# Patient Record
Sex: FEMALE | Race: WHITE | NOT HISPANIC OR LATINO | Employment: OTHER | URBAN - METROPOLITAN AREA
[De-identification: names, ages, dates, MRNs, and addresses within clinical notes are randomized per-mention and may not be internally consistent; named-entity substitution may affect disease eponyms.]

---

## 2017-01-14 ENCOUNTER — OFFICE VISIT (OUTPATIENT)
Dept: FAMILY MEDICINE | Facility: CLINIC | Age: 52
End: 2017-01-14

## 2017-01-14 VITALS
HEART RATE: 64 BPM | DIASTOLIC BLOOD PRESSURE: 86 MMHG | WEIGHT: 293 LBS | OXYGEN SATURATION: 96 % | SYSTOLIC BLOOD PRESSURE: 128 MMHG | HEIGHT: 60 IN | RESPIRATION RATE: 16 BRPM | BODY MASS INDEX: 57.52 KG/M2 | TEMPERATURE: 98 F

## 2017-01-14 DIAGNOSIS — L40.9 PSORIASIS: ICD-10-CM

## 2017-01-14 DIAGNOSIS — I10 ESSENTIAL HYPERTENSION, BENIGN: ICD-10-CM

## 2017-01-14 DIAGNOSIS — R60.0 LOCALIZED EDEMA: ICD-10-CM

## 2017-01-14 DIAGNOSIS — E66.01 MORBID OBESITY WITH BMI OF 60.0-69.9, ADULT (H): ICD-10-CM

## 2017-01-14 DIAGNOSIS — L03.115 CELLULITIS OF RIGHT LOWER EXTREMITY: Primary | ICD-10-CM

## 2017-01-14 PROCEDURE — 99214 OFFICE O/P EST MOD 30 MIN: CPT | Performed by: FAMILY MEDICINE

## 2017-01-14 RX ORDER — LOSARTAN POTASSIUM AND HYDROCHLOROTHIAZIDE 25; 100 MG/1; MG/1
1 TABLET ORAL DAILY
Qty: 90 TABLET | Refills: 1 | Status: SHIPPED | OUTPATIENT
Start: 2017-01-14 | End: 2017-07-11

## 2017-01-14 RX ORDER — ATENOLOL 25 MG/1
50 TABLET ORAL
Qty: 180 TABLET | Refills: 1 | Status: SHIPPED | OUTPATIENT
Start: 2017-01-14 | End: 2017-07-11

## 2017-01-14 NOTE — NURSING NOTE
Mirta Decker is here today for a possible Cellulitis Infection: Started feeling like she might have a Cellulitis infection starting (chills, stomach aches, nausea, moisture behind knees) x2 weeks.    Pre-Visit Screening :  Immunizations : up to date  Colonoscopy : is due and to be scheduled by patient for later completion  Mammogram : is due and to be scheduled by patient for later completion  Asthma Action Test/Plan : NA  PHQ9/GAD7 :  NA  BP done on the left arm, with a large sized cuff.  Pulse - regular  My Chart - accepts    CLASSIFICATION OF OVERWEIGHT AND OBESITY BY BMI                         Obesity Class           BMI(kg/m2)  Underweight                                    < 18.5  Normal                                         18.5-24.9  Overweight                                     25.0-29.9  OBESITY                     I                  30.0-34.9                              II                 35.0-39.9  EXTREME OBESITY             III                >40                             Patient's  BMI Body mass index is 67.57 kg/(m^2).  http://hin.nhlbi.nih.gov/menuplanner/menu.cgi  Questioned patient about current smoking habits.  Pt. has never smoked.  Ruth Martinez, CMA

## 2017-01-14 NOTE — PATIENT INSTRUCTIONS
Back to Dr Elise    1)  Medication: continue current medication regimen unchanged  2)  Dietary sodium restriction  3)  Regular aerobic exercise  4)  Recheck in 6 months fasting, sooner should new symptoms or   problems arise.    Patient Education: Reviewed risks of hypertension and principles of   treatment.

## 2017-01-14 NOTE — PROGRESS NOTES
Two issues/ 2 notes    1. SUBJECTIVE: 52 year old female complaining of right posterior leg irritation and chills for 1 day(s).   The patient describes chronic recurrent cellulitis with psoriatic lesions on the back of her legs at the knee/ skin rubs together with every step.   The patient denies a history of GI symptoms, pain or respiratory issues.   Smoking history: No.   Relevant past medical history: positive for followed by dermatology/ off medications and reintroducing for psoriasis control/ has an appointment in 3 days. Needs BP medications/ see second note.    OBJECTIVE: The patient appears healthy, alert, no distress, cooperative, over weight and fatigued.   EXT; Right posterior knee psoriatic lesions with foul smelling drainage and a 3 by 8 cm area of cellulitis. Tender to the touch. The lower extremities are normal and reveal no sign of DVT. Calves and thighs are soft and non tender, no swelling. Sophia's sign is negative.  Pedal pulses are normal.    ASSESSMENT: (L03.115) Cellulitis of right lower extremity  (primary encounter diagnosis)  Comment: good skin care  Plan: cephalexin (KEFLEX) 250 MG capsule        Potential medication side effects were discussed with the patient; let me know if any occur.  Back to specialists    (L40.9) Psoriasis  Plan: cephalexin (KEFLEX) 250 MG capsule        I have reviewed the patient's medical history in detail and updated the computerized patient record.      (I10) Essential hypertension, benign  Plan: losartan-hydrochlorothiazide (HYZAAR) 100-25 MG        per tablet, atenolol (TENORMIN) 25 MG tablet        1)  Medication: continue current medication regimen unchanged  2)  Dietary sodium restriction  3)  Regular aerobic exercise  4)  Recheck in 6 months fasting, sooner should new symptoms or   problems arise.    Patient Education: Reviewed risks of hypertension and principles of   treatment.        (R60.0) Localized edema  Plan: losartan-hydrochlorothiazide (HYZAAR)  100-25 MG        per tablet            (E66.01,  Z68.44) Morbid obesity with BMI of 60.0-69.9, adult (H)  Plan:         2. SUBJECTIVE:  Mirta Decker is an 52 year old female who presents for evaluation of   hypertension. She indicates that she is feeling well and   denies any symptoms referable to her elevated blood pressure.   Specifically denies chest pain, palpitations, dyspnea, orthopnea,   PND or peripheral edema. Current medication regimen is as listed   below. Patient denies any side effects of medication.    Family history: positive for hypertension, diabetes mellitus and cardiovascular disease  Age at onset of elevated blood pressure: 35 with weight gain  Cardiovascular risk factors: family history, hypertension, obesity and sedentary life style  Use of agents associated with hypertension: none  History of renal disease: negative  History of flank trauma: negative    Current Outpatient Prescriptions   Medication     cephalexin (KEFLEX) 250 MG capsule     losartan-hydrochlorothiazide (HYZAAR) 100-25 MG per tablet     atenolol (TENORMIN) 25 MG tablet     Cholecalciferol (VITAMIN D) 2000 UNITS tablet     folic acid (FOLVITE) 400 MCG tablet     Lactobacillus-Inulin (CULTURELLE DIGESTIVE HEALTH) CAPS     Methotrexate, PF, 25 MG/0.5ML SOAJ     apremilast (OTEZLA) 30 MG tablet     calcipotriene (DOVONOX) 0.005 % cream     UNABLE TO FIND     ondansetron (ZOFRAN-ODT) 4 MG disintegrating tablet     prochlorperazine (COMPAZINE) 5 MG tablet     Menthol-Zinc Oxide (GOLD BOND) POWD     IBUPROFEN PO     Acetaminophen (TYLENOL EXTRA STRENGTH PO)     ciclopirox (LOPROX) 0.77 % GEL     [DISCONTINUED] losartan-hydrochlorothiazide (HYZAAR) 100-25 MG per tablet     [DISCONTINUED] atenolol (TENORMIN) 25 MG tablet     No current facility-administered medications for this visit.     Allergies   Allergen Reactions     Penicillin G        Social History   Substance Use Topics     Smoking status: Never Smoker      Smokeless  tobacco: Never Used     Alcohol Use: 0.5 oz/week     1 drink(s) per week      Comment: occasional       OBJECTIVE:  /86 mmHg  Pulse 64  Temp(Src) 98  F (36.7  C) (Oral)  Resp 16  Ht 1.524 m (5')  Wt 156.945 kg (346 lb)  BMI 67.57 kg/m2  SpO2 96%  Repeat BP R arm seated = 128/86   with X-large size cuff.  Fundi: deferred  Thyroid: normal to inspection and palpation  Lungs: negative, Percussion normal. Good diaphragmatic excursion. Lungs clear  Heart: negative, PMI normal. No lifts, heaves, or thrills. RRR. No murmurs, clicks gallops or rub  Peripheral pulses: radial=4/4, femoral=4/4, popliteal=4/4, dorsalis pedis=4/4,

## 2017-07-04 ENCOUNTER — TELEPHONE (OUTPATIENT)
Dept: FAMILY MEDICINE | Facility: CLINIC | Age: 52
End: 2017-07-04

## 2017-07-04 NOTE — TELEPHONE ENCOUNTER
Pain in calf- worried she might have blood clot- want to be seen today  Informed office is closed  Directed to urgent care

## 2017-07-04 NOTE — TELEPHONE ENCOUNTER
Correction to above note (previous note was for different patient)    Patient on immunosuppressives, developing a rash behind her knee  Running low grade fever  On immunosuppressants  Requesting cephalexin be called in for her   Office is closed for fourth of July  Advised that she be seen in ER (complex medical problems including immunosuppression and infection)    She was unhappy with this medical advice-  Does not want to go to ER and wait to be seen

## 2017-07-11 ENCOUNTER — OFFICE VISIT (OUTPATIENT)
Dept: FAMILY MEDICINE | Facility: CLINIC | Age: 52
End: 2017-07-11

## 2017-07-11 VITALS
SYSTOLIC BLOOD PRESSURE: 140 MMHG | TEMPERATURE: 98.2 F | HEIGHT: 60 IN | HEART RATE: 68 BPM | RESPIRATION RATE: 18 BRPM | OXYGEN SATURATION: 96 % | DIASTOLIC BLOOD PRESSURE: 90 MMHG | BODY MASS INDEX: 57.52 KG/M2 | WEIGHT: 293 LBS

## 2017-07-11 DIAGNOSIS — R60.0 LOCALIZED EDEMA: ICD-10-CM

## 2017-07-11 DIAGNOSIS — I10 ESSENTIAL HYPERTENSION, BENIGN: Primary | ICD-10-CM

## 2017-07-11 DIAGNOSIS — Z76.0 ENCOUNTER FOR MEDICATION REFILL: ICD-10-CM

## 2017-07-11 PROCEDURE — 80053 COMPREHEN METABOLIC PANEL: CPT | Mod: 90 | Performed by: FAMILY MEDICINE

## 2017-07-11 PROCEDURE — 36415 COLL VENOUS BLD VENIPUNCTURE: CPT | Performed by: FAMILY MEDICINE

## 2017-07-11 PROCEDURE — 99213 OFFICE O/P EST LOW 20 MIN: CPT | Performed by: FAMILY MEDICINE

## 2017-07-11 RX ORDER — LOSARTAN POTASSIUM AND HYDROCHLOROTHIAZIDE 25; 100 MG/1; MG/1
1 TABLET ORAL DAILY
Qty: 90 TABLET | Refills: 1 | Status: SHIPPED | OUTPATIENT
Start: 2017-07-11 | End: 2018-05-01

## 2017-07-11 RX ORDER — ATENOLOL 25 MG/1
25 TABLET ORAL
COMMUNITY
Start: 2017-07-11 | End: 2018-05-01

## 2017-07-11 NOTE — MR AVS SNAPSHOT
After Visit Summary   7/11/2017    Mirta Decker    MRN: [id number]           Patient Information     Date Of Birth          1965        Visit Information        Provider Department      7/11/2017 5:30 PM Yani Martin MD [facility]        Today's Diagnoses     Essential hypertension, benign    -  1    Localized edema        Encounter for medication refill          Care Instructions    1)  Medication: continue current medication regimen unchanged  2)  Dietary sodium restriction  3)  Regular aerobic exercise  4)  Recheck in 6 months, sooner should new symptoms or   problems arise.    Patient Education: Reviewed risks of hypertension and principles of   treatment.              Follow-ups after your visit        Follow-up notes from your care team     Return in about 6 months (around 1/11/2018).      Who to contact     If you have questions or need follow up information about today's clinic visit or your schedule please contact [facility] directly at [phone].  Normal or non-critical lab and imaging results will be communicated to you by Insem Spahart, letter or phone within 4 business days after the clinic has received the results. If you do not hear from us within 7 days, please contact the clinic through Insem Spahart or phone. If you have a critical or abnormal lab result, we will notify you by phone as soon as possible.  Submit refill requests through [facility] or call your pharmacy and they will forward the refill request to us. Please allow 3 business days for your refill to be completed.          Additional Information About Your Visit        MyChart Information     [facility] gives you secure access to your electronic health record. If you see a primary care provider, you can also send messages to your care team and make appointments. If you have questions, please call your primary care clinic.  If you do not have a primary care provider, please call  "[phone] and they will assist you.        Care EveryWhere ID     This is your Care EveryWhere ID. This could be used by other organizations to access your [facility] medical records  [id number]        Your Vitals Were     Pulse Temperature Respirations Height Pulse Oximetry BMI (Body Mass Index)    68 98.2  F (36.8  C) (Oral) 18 1.511 m (4' 11.5\") 96% 70.9 kg/m2       Blood Pressure from Last 3 Encounters:   07/11/17 140/90   01/14/17 128/86   08/19/16 138/78    Weight from Last 3 Encounters:   07/11/17 (!) 161.9 kg (357 lb)   01/14/17 (!) 156.9 kg (346 lb)   08/19/16 (!) 147 kg (324 lb)              We Performed the Following     COMPREHENSIVE METABOLIC PANEL (QUEST) XCMP     VENOUS COLLECTION          Today's Medication Changes          These changes are accurate as of: 7/11/17  7:19 PM.  If you have any questions, ask your nurse or doctor.               These medicines have changed or have updated prescriptions.        Dose/Directions    atenolol 25 MG tablet   Commonly known as:  TENORMIN   This may have changed:  how much to take   Used for:  Essential hypertension, benign   Changed by:  Yani Martin MD        Dose:  25 mg   Take 1 tablet (25 mg) by mouth 2 times daily   Refills:  0            Where to get your medicines      These medications were sent to [facility] #[id number] [place] [id number]     Phone:  [phone]     losartan-hydrochlorothiazide 100-25 MG per tablet                Primary Care Provider Office Phone # Fax #    Yani Martin [phone]       [facility] [address] [address]        Equal Access to Services     LEONCIO DOUGLASS AH: Rosita Constantino, waaxda luqadaha, qaybta kaalmada adeegyada, bakari bright. So [facility] [phone].    ATENCIÓN: Si habla español, tiene a gant disposición servicios gratuitos de asistencia lingüística. " Delta dowling [phone].    We comply with applicable federal civil rights laws and [place] laws. We do not discriminate on the basis of race, color, national origin, age, disability sex, sexual orientation or gender identity.            Thank you!     Thank you for choosing [facility]  for your care. Our goal is always to provide you with excellent care. Hearing back from our patients is one way we can continue to improve our services. Please take a few minutes to complete the written survey that you may receive in the mail after your visit with us. Thank you!             Your Updated Medication List - Protect others around you: Learn how to safely use, store and throw away your medicines at www.disposemymeds.org.          This list is accurate as of: 7/11/17  7:19 PM.  Always use your most recent med list.                   Brand Name Dispense Instructions for use Diagnosis    atenolol 25 MG tablet    TENORMIN     Take 1 tablet (25 mg) by mouth 2 times daily    Essential hypertension, benign       calcipotriene 0.005 % cream    DOVONOX     Apply topically 2 times daily    Psoriatic arthropathy (H)       CULTURELLE DIGESTIVE HEALTH Caps       Cellulitis of right lower extremity       folic acid 400 MCG tablet    FOLVITE     Take 1 tablet (400 mcg) by mouth daily    Psoriatic arthropathy (H)       GOLD BOND Powd      Externally apply topically daily as needed        IBUPROFEN PO      Take 200-800 mg by mouth every 8 hours as needed for moderate pain        LOPROX 0.77 % Gel   Generic drug:  ciclopirox      Externally apply topically daily as needed (for psoriasis)    Psoriatic arthropathy (H)       losartan-hydrochlorothiazide 100-25 MG per tablet    HYZAAR    90 tablet    Take 1 tablet by mouth daily    Essential hypertension, benign, Localized edema       Methotrexate (PF) 25 MG/0.5ML Soaj      Inject Subcutaneous once a week    Psoriatic arthropathy (H)       ondansetron 4 MG ODT tab     ZOFRAN-ODT    30 tablet    Take 1 tablet (4 mg) by mouth every 6 hours as needed for nausea    Cellulitis of right lower extremity       OTEZLA 30 MG tablet   Generic drug:  apremilast      Take 30 mg by mouth 2 times daily    Psoriatic arthropathy (H)       prochlorperazine 5 MG tablet    COMPAZINE    30 tablet    Take 1-2 tablets (5-10 mg) by mouth every 6 hours as needed for nausea or vomiting    Cellulitis of right lower extremity       TYLENOL EXTRA STRENGTH PO      Take 1,000 mg by mouth 2 times daily as needed (arthritis)        UNABLE TO FIND      MEDICATION NAME: Cuticort Spray 1% from derm - BID for 3 wks    Psoriatic arthropathy (H)       vitamin D 2000 UNITS tablet      Take 2,000 Units by mouth daily    Psoriatic arthropathy (H)

## 2017-07-11 NOTE — PROGRESS NOTES
"  SUBJECTIVE:  Mirta Decker is an 52 year old female who presents for evaluation of   hypertension. She indicates that she is feeling well and   denies any symptoms referable to her elevated blood pressure.   Specifically denies chest pain, palpitations, dyspnea, orthopnea,   PND or peripheral edema. Current medication regimen is as listed   below. Patient denies any side effects of medication.    Family history: positive for hypertension, diabetes mellitus and cardiovascular disease  Age at onset of elevated blood pressure: 35 with weight gain  Cardiovascular risk factors: family history, hypertension, obesity and sedentary life style  Use of agents associated with hypertension: none  History of renal disease: negative  History of flank trauma: negative    Current Outpatient Prescriptions   Medication     losartan-hydrochlorothiazide (HYZAAR) 100-25 MG per tablet     Cholecalciferol (VITAMIN D) 2000 UNITS tablet     folic acid (FOLVITE) 400 MCG tablet     Lactobacillus-Inulin ([facility] DIGESTIVE [facility]) CAPS     Methotrexate, PF, 25 MG/0.5ML SOAJ     apremilast (OTEZLA) 30 MG tablet     calcipotriene (DOVONOX) 0.005 % cream     UNABLE TO FIND     ondansetron (ZOFRAN-ODT) 4 MG disintegrating tablet     prochlorperazine (COMPAZINE) 5 MG tablet     Menthol-Zinc Oxide (GOLD BOND) POWD     IBUPROFEN PO     Acetaminophen (TYLENOL EXTRA STRENGTH PO)     ciclopirox (LOPROX) 0.77 % GEL     No current facility-administered medications for this visit.      Allergies   Allergen Reactions     Penicillin G        Social History   Substance Use Topics     Smoking status: Never Smoker     Smokeless tobacco: Never Used     Alcohol use 0.5 oz/week     1 drink(s) per week      Comment: occasional       OBJECTIVE:  /90 (BP Location: Left arm, Cuff Size: Adult Large)  Pulse 68  Temp 98.2  F (36.8  C) (Oral)  Resp 18  Ht 1.511 m (4' 11.5\")  Wt (!) 161.9 kg (357 lb)  SpO2 96%  BMI 70.9 kg/m2  Repeat BP R arm seated " 140/90 with regular size cuff.  Fundi: deferred  Thyroid: normal to inspection and palpation  Lungs: negative, Percussion normal. Good diaphragmatic excursion. Lungs clear  Heart: negative, PMI normal. No lifts, heaves, or thrills. RRR. No murmurs, clicks gallops or rub  Peripheral pulses: radial=4/4, femoral=4/4, popliteal=4/4, dorsalis pedis=4/4,  Abd: The abdomen is soft without tenderness, guarding, mass or organomegaly. Bowel sounds are normal. No CVA tenderness or inguinal adenopathy noted.  BMI : Body mass index is 70.9 kg/(m^2).    ASSESSMENT:  (I10) Essential hypertension, benign  (primary encounter diagnosis)  Plan: COMPREHENSIVE METABOLIC PANEL ([facility]) XCMP,         VENOUS COLLECTION, atenolol (TENORMIN) 25 MG         tablet, losartan-hydrochlorothiazide (HYZAAR)         100-25 MG per tablet        1)  Medication: continue current medication regimen unchanged  2)  Dietary sodium restriction  3)  Regular aerobic exercise  4)  Recheck in 6 months, sooner should new symptoms or   problems arise.    Patient Education: Reviewed risks of hypertension and principles of   treatment.        (R60.0) Localized edema  Plan: COMPREHENSIVE METABOLIC PANEL ([facility]) XCMP,         VENOUS COLLECTION, losartan-hydrochlorothiazide        (HYZAAR) 100-25 MG per tablet        better    (Z76.0) Encounter for medication refill  Plan: COMPREHENSIVE METABOLIC PANEL (QUEST) XCMP,         VENOUS COLLECTION

## 2017-07-11 NOTE — NURSING NOTE
Patient is here for a recheck of their medication.  Pre-Visit Screening :  Immunizations : up to date    Colonoscopy : declined  Mammogram : declined  Asthma Action Test/Plan : na  PHQ9/GAD7 :  na  Pulse - regular    Medication Reconciliation: complete      CLASSIFICATION OF OVERWEIGHT AND OBESITY BY BMI                         Obesity Class           BMI(kg/m2)  Underweight                                    < 18.5  Normal                                         18.5-24.9  Overweight                                     25.0-29.9  OBESITY                     I                  30.0-34.9                              II                 35.0-39.9  EXTREME OBESITY             III                >40                             Patient's  BMI Body mass index is 70.9 kg/(m^2).  http://hin.nhlbi.nih.gov/menuplanner/menu.cgi  Questioned patient about current smoking habits.  Pt. has never smoked.

## 2017-07-12 NOTE — PATIENT INSTRUCTIONS
1)  Medication: continue current medication regimen unchanged  2)  Dietary sodium restriction  3)  Regular aerobic exercise  4)  Recheck in 6 months, sooner should new symptoms or   problems arise.    Patient Education: Reviewed risks of hypertension and principles of   treatment.

## 2017-07-13 LAB
ALBUMIN SERPL-MCNC: 3.7 G/DL (ref 3.6–5.1)
ALBUMIN/GLOB SERPL: 0.9 (CALC) (ref 1–2.5)
ALP SERPL-CCNC: 66 U/L (ref 33–130)
ALT SERPL-CCNC: 29 U/L (ref 6–29)
AST SERPL-CCNC: 21 U/L (ref 10–35)
BILIRUB SERPL-MCNC: 0.7 MG/DL (ref 0.2–1.2)
BUN SERPL-MCNC: 15 MG/DL (ref 7–25)
BUN/CREATININE RATIO: ABNORMAL (CALC) (ref 6–22)
CALCIUM SERPL-MCNC: 9.3 MG/DL (ref 8.6–10.4)
CHLORIDE SERPLBLD-SCNC: 100 MMOL/L (ref 98–110)
CO2 SERPL-SCNC: 26 MMOL/L (ref 20–31)
CREAT SERPL-MCNC: 0.7 MG/DL (ref 0.5–1.05)
EGFR AFRICAN AMERICAN - QUEST: 114 ML/MIN/1.73M2
GFR SERPL CREATININE-BSD FRML MDRD: 98 ML/MIN/1.73M2
GLOBULIN, CALCULATED - QUEST: 4 G/DL (CALC) (ref 1.9–3.7)
GLUCOSE - QUEST: 94 MG/DL (ref 65–99)
POTASSIUM SERPL-SCNC: 4.1 MMOL/L (ref 3.5–5.3)
PROT SERPL-MCNC: 7.7 G/DL (ref 6.1–8.1)
SODIUM SERPL-SCNC: 139 MMOL/L (ref 135–146)

## 2017-07-25 ENCOUNTER — OFFICE VISIT (OUTPATIENT)
Dept: FAMILY MEDICINE | Facility: CLINIC | Age: 52
End: 2017-07-25

## 2017-07-25 ENCOUNTER — MYC MEDICAL ADVICE (OUTPATIENT)
Dept: FAMILY MEDICINE | Facility: CLINIC | Age: 52
End: 2017-07-25

## 2017-07-25 VITALS
TEMPERATURE: 98.6 F | OXYGEN SATURATION: 97 % | HEART RATE: 102 BPM | RESPIRATION RATE: 12 BRPM | SYSTOLIC BLOOD PRESSURE: 112 MMHG | DIASTOLIC BLOOD PRESSURE: 72 MMHG

## 2017-07-25 DIAGNOSIS — L40.50 PSORIATIC ARTHROPATHY (H): ICD-10-CM

## 2017-07-25 DIAGNOSIS — L02.419 CELLULITIS AND ABSCESS OF LEG, EXCEPT FOOT: Primary | ICD-10-CM

## 2017-07-25 DIAGNOSIS — L03.119 CELLULITIS AND ABSCESS OF LEG, EXCEPT FOOT: Primary | ICD-10-CM

## 2017-07-25 LAB
% GRANULOCYTES: 81.8 %
HCT VFR BLD AUTO: 40.2 % (ref 35–47)
HEMOGLOBIN: 13 G/DL (ref 11.7–15.7)
LYMPHOCYTES NFR BLD AUTO: 14.3 %
MCH RBC QN AUTO: 27.6 PG (ref 26–33)
MCHC RBC AUTO-ENTMCNC: 32.3 G/DL (ref 31–36)
MCV RBC AUTO: 85.4 FL (ref 78–100)
MONOCYTES NFR BLD AUTO: 3.9 %
PLATELET COUNT - QUEST: 200 10^9/L (ref 150–375)
RBC # BLD AUTO: 4.71 10*12/L (ref 3.8–5.2)
WBC # BLD AUTO: 8.5 10*9/L (ref 4–11)

## 2017-07-25 PROCEDURE — 36415 COLL VENOUS BLD VENIPUNCTURE: CPT | Performed by: FAMILY MEDICINE

## 2017-07-25 PROCEDURE — 99213 OFFICE O/P EST LOW 20 MIN: CPT | Performed by: FAMILY MEDICINE

## 2017-07-25 PROCEDURE — 85025 COMPLETE CBC W/AUTO DIFF WBC: CPT | Performed by: FAMILY MEDICINE

## 2017-07-25 RX ORDER — CEPHALEXIN 500 MG/1
500 CAPSULE ORAL 3 TIMES DAILY
Qty: 30 CAPSULE | Refills: 0 | Status: SHIPPED | OUTPATIENT
Start: 2017-07-25 | End: 2017-10-26

## 2017-07-25 RX ORDER — FOLIC ACID 1 MG/1
TABLET ORAL
Refills: 10 | COMMUNITY
Start: 2017-07-16 | End: 2018-05-01

## 2017-07-25 NOTE — MR AVS SNAPSHOT
After Visit Summary   7/25/2017    Mirta Decker    MRN: [id number]           Patient Information     Date Of Birth          1965        Visit Information        Provider Department      7/25/2017 6:15 PM Yani Martin MD [facility]        Today's Diagnoses     Cellulitis and abscess of leg, except foot    -  1    Psoriatic arthropathy (H)          Care Instructions    Gentle skin care  Follow dermatology advise    Recheck by phone 48 hours.  Potential medication side effects were discussed with the patient; let me know if any occur.            Follow-ups after your visit        Follow-up notes from your care team     Return in about 2 days (around 7/27/2017).      Who to contact     If you have questions or need follow up information about today's clinic visit or your schedule please contact [facility] directly at [phone].  Normal or non-critical lab and imaging results will be communicated to you by FounderSynchart, letter or phone within 4 business days after the clinic has received the results. If you do not hear from us within 7 days, please contact the clinic through FounderSynchart or phone. If you have a critical or abnormal lab result, we will notify you by phone as soon as possible.  Submit refill requests through [employer] or call your pharmacy and they will forward the refill request to us. Please allow 3 business days for your refill to be completed.          Additional Information About Your Visit        MyChart Information     [employer] gives you secure access to your electronic health record. If you see a primary care provider, you can also send messages to your care team and make appointments. If you have questions, please call your primary care clinic.  If you do not have a primary care provider, please call [phone] and they will assist you.        Care EveryWhere ID     This is your Care EveryWhere ID. This could be used by other  organizations to access your [facility] medical records  [id number]        Your Vitals Were     Pulse Temperature Respirations Pulse Oximetry          102 98.6  F (37  C) (Oral) 12 97%         Blood Pressure from Last 3 Encounters:   07/25/17 112/72   07/11/17 140/90   01/14/17 128/86    Weight from Last 3 Encounters:   07/11/17 (!) 161.9 kg (357 lb)   01/14/17 (!) 156.9 kg (346 lb)   08/19/16 (!) 147 kg (324 lb)              We Performed the Following     CL AFF HEMOGRAM/PLATE/DIFF (BFP)     VENOUS COLLECTION          Today's Medication Changes          These changes are accurate as of: 7/25/17  7:06 PM.  If you have any questions, ask your nurse or doctor.               Start taking these medicines.        Dose/Directions    cephALEXin 500 MG capsule   Commonly known as:  KEFLEX   Used for:  Cellulitis and abscess of leg, except foot   Started by:  Yani Martin MD        Dose:  500 mg   Take 1 capsule (500 mg) by mouth 3 times daily   Quantity:  30 capsule   Refills:  0            Where to get your medicines      These medications were sent to [facility][id number] [address] [address]     Phone:  [phone]     cephALEXin 500 MG capsule                Primary Care Provider Office Phone # Fax #    Yani Martin [phone]       [facility] [address]  [facility] [address]        Equal Access to Services     [facility]: Hadii errol ku hadasho Soomaali, waaxda luqadaha, qaybta kaalmada adeegyada, waxay harpal mason . So [facility] [phone].    ATENCIÓN: Si habla español, tiene a gant disposición servicios gratuitos de asistencia lingüística. Llame al [phone].    We comply with applicable federal civil rights laws and [place] laws. We do not discriminate on the basis of race, color, national origin, age, disability sex, sexual orientation or gender identity.            Thank you!      Thank you for choosing [facility]  for your care. Our goal is always to provide you with excellent care. Hearing back from our patients is one way we can continue to improve our services. Please take a few minutes to complete the written survey that you may receive in the mail after your visit with us. Thank you!             Your Updated Medication List - Protect others around you: Learn how to safely use, store and throw away your medicines at www.disposemymeds.org.          This list is accurate as of: 7/25/17  7:06 PM.  Always use your most recent med list.                   Brand Name Dispense Instructions for use Diagnosis    atenolol 25 MG tablet    TENORMIN     Take 1 tablet (25 mg) by mouth 2 times daily    Essential hypertension, benign       calcipotriene 0.005 % cream    DOVONOX     Apply topically 2 times daily    Psoriatic arthropathy (H)       cephALEXin 500 MG capsule    KEFLEX    30 capsule    Take 1 capsule (500 mg) by mouth 3 times daily    Cellulitis and abscess of leg, except foot       CULTURELLE DIGESTIVE HEALTH Caps       Cellulitis of right lower extremity       * folic acid 400 MCG tablet    FOLVITE     Take 1 tablet (400 mcg) by mouth daily    Psoriatic arthropathy (H)       * folic acid 1 MG tablet    FOLVITE          GOLD BOND Powd      Externally apply topically daily as needed        IBUPROFEN PO      Take 200-800 mg by mouth every 8 hours as needed for moderate pain        LOPROX 0.77 % Gel   Generic drug:  ciclopirox      Externally apply topically daily as needed (for psoriasis)    Psoriatic arthropathy (H)       losartan-hydrochlorothiazide 100-25 MG per tablet    HYZAAR    90 tablet    Take 1 tablet by mouth daily    Essential hypertension, benign, Localized edema       Methotrexate (PF) 25 MG/0.5ML Soaj      Inject Subcutaneous once a week    Psoriatic arthropathy (H)       ondansetron 4 MG ODT tab    ZOFRAN-ODT    30 tablet    Take 1 tablet (4 mg) by mouth  every 6 hours as needed for nausea    Cellulitis of right lower extremity       OTEZLA 30 MG tablet   Generic drug:  apremilast      Take 30 mg by mouth 2 times daily    Psoriatic arthropathy (H)       prochlorperazine 5 MG tablet    COMPAZINE    30 tablet    Take 1-2 tablets (5-10 mg) by mouth every 6 hours as needed for nausea or vomiting    Cellulitis of right lower extremity       TYLENOL EXTRA STRENGTH PO      Take 1,000 mg by mouth 2 times daily as needed (arthritis)        UNABLE TO FIND      MEDICATION NAME: Cuticort Spray 1% from derm - BID for 3 wks    Psoriatic arthropathy (H)       vitamin D 2000 UNITS tablet      Take 2,000 Units by mouth daily    Psoriatic arthropathy (H)       * Notice:  This list has 2 medication(s) that are the same as other medications prescribed for you. Read the directions carefully, and ask your doctor or other care provider to review them with you.

## 2017-07-25 NOTE — PROGRESS NOTES
SUBJECTIVE: 52 year old female complaining of episodic fever and chills that comes and goes for 2 week(s).   The patient describes leg pain right inner thigh with redness noted/ had a methotrexate injection today. Multiple infections last 1/2017.  The patient denies a history of nausea or Gi complaints. Able to walk and move at work today.   Smoking history: No.   Relevant past medical history: positive for morbid obesity, psoriasis with folds on the back of her legs.    OBJECTIVE: The patient appears healthy, alert, no distress, cooperative, smiling and over weight.   EXT: Right posterior thigh and posterior knee erythema with weeping skin at the knee posterior fold. The lower extremities reveal no sign of DVT. Thighs are soft.  Sophia's sign is negative.  Pedal pulses are normal.    Cbc: WNL    ASSESSMENT: (L03.119,  L02.419) Cellulitis and abscess of leg, except foot  (primary encounter diagnosis)  Comment: gentle skin care  Plan: cephALEXin (KEFLEX) 500 MG capsule, CL AFF         HEMOGRAM/PLATE/DIFF (BFP), VENOUS COLLECTION        Potential medication side effects were discussed with the patient; let me know if any occur.  Recheck 48 hours    (L40.50) Psoriatic arthropathy (H)  Plan: I have reviewed the patient's medical history in detail and updated the computerized patient record.

## 2017-07-25 NOTE — TELEPHONE ENCOUNTER
From: Mirta Decker  To: Yani Martin MD  Sent: 7/25/2017 9:57 AM CDT  Subject: Updates about my health    Good morning Dr. Martin and team,    I was in to see you several weeks ago, complaining of a low grade fever, (100-101), with chills and nausea. I felt like something was brewing, but at the time, the fever was gone and all looked well. Well, this past weekend, Saturday evening I developed nausea, chills, low grade temp-no higher than 101. I took Advil and my Zofran and was able to rest and by Sunday afternoon, things were improved. Went to work yesterday, and then last pm around 6:00, fever started again with nausea and chills. My right leg is now red in the same area as usual and some of my folds are red and wet with a discharge I was able to clean out. I feel like I could end up in hospital if I don't start on some antibiotic right away. (usually Keflex is what we have been using). I also placed a call to [facility], but he is not in currently.    I am currently at work, but feel these fevers come on later in the day. I am nauseated and chilled but have been continuing to take Advil to keep my Tylenol at bay.    would you be willing to start me on antibiotic and I can come in when you have an opening this week?    please let me know. [facility]acy is [facility] [place]    Elyssa Decker

## 2017-07-25 NOTE — PATIENT INSTRUCTIONS
Gentle skin care  Follow dermatology advise    Recheck by phone 48 hours.  Potential medication side effects were discussed with the patient; let me know if any occur.

## 2017-07-25 NOTE — NURSING NOTE
Patient has had a fever since the 4th - 99.0-100.8  Had the chills and nausea then had several days of feeling just fine.      Then started feeling crappy again yesterday and last night - She did have her Inj yesterday.      She is now having some R leg pain.    Pre-Visit Screening not done today.    Pulse - regular  My Chart - accepts    CLASSIFICATION OF OVERWEIGHT AND OBESITY BY BMI                         Obesity Class           BMI(kg/m2)  Underweight                                    < 18.5  Normal                                         18.5-24.9  Overweight                                     25.0-29.9  OBESITY                     I                  30.0-34.9                              II                 35.0-39.9  EXTREME OBESITY             III                >40                             Patient's  BMI There is no height or weight on file to calculate BMI.  http://hin.nhlbi.nih.gov/menuplanner/menu.cgi  Questioned patient about current smoking habits.  Pt. has never smoked.

## 2017-07-27 ENCOUNTER — TELEPHONE (OUTPATIENT)
Dept: FAMILY MEDICINE | Facility: CLINIC | Age: 52
End: 2017-07-27

## 2017-07-27 NOTE — TELEPHONE ENCOUNTER
Mirta called and LVM with and update on how she is doing since seeing Dr. Martin on 07/25/17.    Update:    She is okay. Her fever has been gone since starting the antibiotics. The area looks slightly less red although is still red and and warm but not spreading. She is no longer having child and is no longer nauseated. She states that she doesn't feel worse.    She is able to be reached on her phone or a PostRank message can be sent with any information that Dr. Martin may want her to have.    Patient Call Back Info:  [phone] (home) [phone] (work)    Ruth Martinez [facility]

## 2017-10-26 ENCOUNTER — MYC MEDICAL ADVICE (OUTPATIENT)
Dept: FAMILY MEDICINE | Facility: CLINIC | Age: 52
End: 2017-10-26

## 2017-10-26 ENCOUNTER — OFFICE VISIT (OUTPATIENT)
Dept: FAMILY MEDICINE | Facility: CLINIC | Age: 52
End: 2017-10-26

## 2017-10-26 VITALS
SYSTOLIC BLOOD PRESSURE: 139 MMHG | OXYGEN SATURATION: 96 % | HEART RATE: 88 BPM | RESPIRATION RATE: 18 BRPM | DIASTOLIC BLOOD PRESSURE: 88 MMHG | TEMPERATURE: 99 F

## 2017-10-26 DIAGNOSIS — L03.119 CELLULITIS AND ABSCESS OF LEG, EXCEPT FOOT: Primary | ICD-10-CM

## 2017-10-26 DIAGNOSIS — L40.50 PSORIATIC ARTHROPATHY (H): ICD-10-CM

## 2017-10-26 DIAGNOSIS — L02.419 CELLULITIS AND ABSCESS OF LEG, EXCEPT FOOT: Primary | ICD-10-CM

## 2017-10-26 PROCEDURE — 99213 OFFICE O/P EST LOW 20 MIN: CPT | Performed by: FAMILY MEDICINE

## 2017-10-26 RX ORDER — CEPHALEXIN 500 MG/1
500 CAPSULE ORAL 2 TIMES DAILY
Qty: 20 CAPSULE | Refills: 0 | Status: SHIPPED | OUTPATIENT
Start: 2017-10-26 | End: 2017-11-29

## 2017-10-26 NOTE — PATIENT INSTRUCTIONS
) Cellulitis and abscess of leg, except foot  (primary encounter diagnosis)  Comment: gentle skin care  Plan: cephALEXin (KEFLEX) 500 MG capsule        Back to Dr Elise

## 2017-10-26 NOTE — TELEPHONE ENCOUNTER
Telephone Information:   Mobile [phone]     Called the patient and she will be coming over to be seen and we will fit her in.

## 2017-10-26 NOTE — PROGRESS NOTES
SUBJECTIVE: 52 year old female complaining of fever with skin irritation and drainage behind her knee for 2 day(s).   The patient describes psoriasis with recurrent cellulitis in this area/ working with Dr Elise for better psoriasis control.   The patient denies a history of chills, leg pain or weakness.   Smoking history: No.   Relevant past medical history: positive for missed her psoriasis injection due to family emergency.    OBJECTIVE: The patient appears healthy, alert, no distress, cooperative, smiling and over weight.   EXT: The lower extremities are normal and reveal no sign of DVT. Calves and thighs are soft and non tender, color is normal, no swelling or redness. Sophia's sign is negative.  Pedal pulses are normal. Scattered psoriatic plaques. Right posterior knee fold erythema 5cm by 3 cm with white discharge. No odor. No groin adenopathy.    ASSESSMENT: (L03.119,  L02.419) Cellulitis and abscess of leg, except foot  (primary encounter diagnosis)  Comment: gentle skin care  Plan: cephALEXin (KEFLEX) 500 MG capsule        Back to Dr Elise    (L40.50) Psoriatic arthropathy (H)  Plan: I have reviewed the patient's medical history in detail and updated the computerized patient record.

## 2017-10-26 NOTE — TELEPHONE ENCOUNTER
"From: Mirta Decker  To: Yani Martin MD  Sent: 10/26/2017 8:38 AM CDT  Subject: Question about an upcoming visit    Good morning Dr. Martin,  I booked an appointment to see you Friday, October 27th at 11:00 a.m.. I have an infection of some sort again, chills, nausea and so far fever is staying around 99. I have not been on methotrexate or light treaments for at least a month, so I have had some \"smelly\" discharge behind my right knee. They say you are completely booked for today, They offered me appointment with one of the PA's -dont really feel comfortable with that. I have had a rough two months. Today, is the one month day that my dad passed away unexpectedly at home, so I have been under a large amount of stress. 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    Hoping, you can get me in today, otherwise, I will see you tomorrow. I do not want to end up in hospital. my [facility] is [facility] in [place],. I will continue to take ibuprofen. Dr. Elise is also not available for me to see.    Thank you,  Elyssa"

## 2017-10-26 NOTE — MR AVS SNAPSHOT
After Visit Summary   10/26/2017    Mirta Decker    MRN: [id number]           Patient Information     Date Of Birth          1965        Visit Information        Provider Department      10/26/2017 12:15 PM Yani Martin MD [facility]        Today's Diagnoses     Cellulitis and abscess of leg, except foot    -  1    Psoriatic arthropathy (H)          Care Instructions    ) Cellulitis and abscess of leg, except foot  (primary encounter diagnosis)  Comment: gentle skin care  Plan: cephALEXin (KEFLEX) 500 MG capsule        Back to Dr Elise            Follow-ups after your visit        Follow-up notes from your care team     Return in about 1 week (around 11/2/2017).      Who to contact     If you have questions or need follow up information about today's clinic visit or your schedule please contact [facility] directly at [phone].  Normal or non-critical lab and imaging results will be communicated to you by SmartCuphart, letter or phone within 4 business days after the clinic has received the results. If you do not hear from us within 7 days, please contact the clinic through SmartCuphart or phone. If you have a critical or abnormal lab result, we will notify you by phone as soon as possible.  Submit refill requests through [employer] or call your pharmacy and they will forward the refill request to us. Please allow 3 business days for your refill to be completed.          Additional Information About Your Visit        MyChart Information     [employer] gives you secure access to your electronic health record. If you see a primary care provider, you can also send messages to your care team and make appointments. If you have questions, please call your primary care clinic.  If you do not have a primary care provider, please call [phone] and they will assist you.        Care EveryWhere ID     This is your Care EveryWhere ID. This could be used by  other organizations to access your [facility] medical records  [id number]        Your Vitals Were     Pulse Temperature Respirations Pulse Oximetry          88 99  F (37.2  C) (Oral) 18 96%         Blood Pressure from Last 3 Encounters:   10/26/17 139/88   07/25/17 112/72   07/11/17 140/90    Weight from Last 3 Encounters:   07/11/17 (!) 161.9 kg (357 lb)   01/14/17 (!) 156.9 kg (346 lb)   08/19/16 (!) 147 kg (324 lb)              Today, you had the following     No orders found for display         Today's Medication Changes          These changes are accurate as of: 10/26/17  2:28 PM.  If you have any questions, ask your nurse or doctor.               These medicines have changed or have updated prescriptions.        Dose/Directions    cephALEXin 500 MG capsule   Commonly known as:  KEFLEX   This may have changed:  when to take this   Used for:  Cellulitis and abscess of leg, except foot   Changed by:  Yani Martin MD        Dose:  500 mg   Take 1 capsule (500 mg) by mouth 2 times daily   Quantity:  20 capsule   Refills:  0            Where to get your medicines      These medications were sent to [id number], [address] [address]     Phone:  [phone]     cephALEXin 500 MG capsule                Primary Care Provider Office Phone # Fax #    Yani Martin [phone] [address] [place] [address]        Equal Access to Services     [facility] AH: Hadii aad ku hadasho Soomaali, waaxda luqadaha, qaybta kaalmada adeegyada, waxay harpal mason . So [facility] [phone].    ATENCIÓN: Si habla español, tiene a gant disposición servicios gratuitos de asistencia lingüística. Llame al [phone].    We comply with applicable federal civil rights laws and [place] laws. We do not discriminate on the basis of race, color, national origin, age, disability, sex, sexual orientation, or gender  identity.            Thank you!     Thank you for choosing [facility]  for your care. Our goal is always to provide you with excellent care. Hearing back from our patients is one way we can continue to improve our services. Please take a few minutes to complete the written survey that you may receive in the mail after your visit with us. Thank you!             Your Updated Medication List - Protect others around you: Learn how to safely use, store and throw away your medicines at www.disposemymeds.org.          This list is accurate as of: 10/26/17  2:28 PM.  Always use your most recent med list.                   Brand Name Dispense Instructions for use Diagnosis    atenolol 25 MG tablet    TENORMIN     Take 1 tablet (25 mg) by mouth 2 times daily    Essential hypertension, benign       calcipotriene 0.005 % cream    DOVONOX     Apply topically 2 times daily    Psoriatic arthropathy (H)       cephALEXin 500 MG capsule    KEFLEX    20 capsule    Take 1 capsule (500 mg) by mouth 2 times daily    Cellulitis and abscess of leg, except foot       CULTURELLE DIGESTIVE HEALTH Caps       Cellulitis of right lower extremity       * folic acid 400 MCG tablet    FOLVITE     Take 1 tablet (400 mcg) by mouth daily    Psoriatic arthropathy (H)       * folic acid 1 MG tablet    FOLVITE          GOLD BOND Powd      Externally apply topically daily as needed        IBUPROFEN PO      Take 200-800 mg by mouth every 8 hours as needed for moderate pain        LOPROX 0.77 % Gel   Generic drug:  ciclopirox      Externally apply topically daily as needed (for psoriasis)    Psoriatic arthropathy (H)       losartan-hydrochlorothiazide 100-25 MG per tablet    HYZAAR    90 tablet    Take 1 tablet by mouth daily    Essential hypertension, benign, Localized edema       Methotrexate (PF) 25 MG/0.5ML Soaj      Inject Subcutaneous once a week    Psoriatic arthropathy (H)       ondansetron 4 MG ODT tab    ZOFRAN-ODT    30  tablet    Take 1 tablet (4 mg) by mouth every 6 hours as needed for nausea    Cellulitis of right lower extremity       OTEZLA 30 MG tablet   Generic drug:  apremilast      Take 30 mg by mouth 2 times daily    Psoriatic arthropathy (H)       prochlorperazine 5 MG tablet    COMPAZINE    30 tablet    Take 1-2 tablets (5-10 mg) by mouth every 6 hours as needed for nausea or vomiting    Cellulitis of right lower extremity       TYLENOL EXTRA STRENGTH PO      Take 1,000 mg by mouth 2 times daily as needed (arthritis)        UNABLE TO FIND      MEDICATION NAME: Cuticort Spray 1% from derm - BID for 3 wks    Psoriatic arthropathy (H)       vitamin D 2000 UNITS tablet      Take 2,000 Units by mouth daily    Psoriatic arthropathy (H)       * Notice:  This list has 2 medication(s) that are the same as other medications prescribed for you. Read the directions carefully, and ask your doctor or other care provider to review them with you.

## 2017-11-27 ENCOUNTER — MYC MEDICAL ADVICE (OUTPATIENT)
Dept: FAMILY MEDICINE | Facility: CLINIC | Age: 52
End: 2017-11-27

## 2017-11-27 NOTE — TELEPHONE ENCOUNTER
From: Mirta Decker  To: Yani Martin MD  Sent: 11/27/2017 3:25 PM CST  Subject: Updates about my health    Good afternoon,   I have a question. I had my flu vaccine in the afternoon on Friday, November 24th at work. Several hours later, I started to not feel so good, mostly fatigue (which has happened in the past). I had a rough nigh, bad headache, chills, fatigue that worsened and severe body aches (similar to flu). I know you can't get flu from the injection, however, this really feels like a mini flu- I don't have a high temp, however, I am concerned because i am immuno compromised and recently finished a course of antibiotic for a skin infection. Should I be concerned? i did stay home from work today and now today, I am starting to try and clear my lungs and feel like my symptoms could be worsening? or is it just a fluke? I have been very chilled at times, however, my temp has stayed anywhere from 96 to 99 orally. Our clinic is using a new brand of flu vaccine this year. I used to get influenza years ago and have felt much worse than this, but I find is suspicious that I am feeling this way, especially  hours after getting the flu vaccine. Any advice? or just wait it out. I have also had some nausea. I have been taking ibuprofen for the 600 mg every 4 hours and have some Zofran for the nausea. The ibuprofen has helped for the headache and body aches and zofran has helped the nausea. No diarrhea or vomiting. I feel the body aches and headaches are improving, especially with the use of meds, however, the lung stuff started today.    Thank you for your time,    Elyssa Decker

## 2017-11-29 ENCOUNTER — OFFICE VISIT (OUTPATIENT)
Dept: FAMILY MEDICINE | Facility: CLINIC | Age: 52
End: 2017-11-29

## 2017-11-29 VITALS
HEART RATE: 84 BPM | TEMPERATURE: 98.2 F | SYSTOLIC BLOOD PRESSURE: 138 MMHG | DIASTOLIC BLOOD PRESSURE: 88 MMHG | OXYGEN SATURATION: 92 % | RESPIRATION RATE: 16 BRPM

## 2017-11-29 DIAGNOSIS — J06.9 VIRAL UPPER RESPIRATORY TRACT INFECTION: Primary | ICD-10-CM

## 2017-11-29 DIAGNOSIS — L40.50 PSORIATIC ARTHROPATHY (H): ICD-10-CM

## 2017-11-29 LAB
% GRANULOCYTES: 70.6 %
HCT VFR BLD AUTO: 42.2 % (ref 35–47)
HEMOGLOBIN: 12.9 G/DL (ref 11.7–15.7)
LYMPHOCYTES NFR BLD AUTO: 22.3 %
MCH RBC QN AUTO: 26 PG (ref 26–33)
MCHC RBC AUTO-ENTMCNC: 30.6 G/DL (ref 31–36)
MCV RBC AUTO: 85 FL (ref 78–100)
MONOCYTES NFR BLD AUTO: 7.1 %
PLATELET COUNT - QUEST: 237 10^9/L (ref 150–375)
RBC # BLD AUTO: 4.97 10*12/L (ref 3.8–5.2)
WBC # BLD AUTO: 7.6 10*9/L (ref 4–11)

## 2017-11-29 PROCEDURE — 36415 COLL VENOUS BLD VENIPUNCTURE: CPT | Performed by: FAMILY MEDICINE

## 2017-11-29 PROCEDURE — 99213 OFFICE O/P EST LOW 20 MIN: CPT | Performed by: FAMILY MEDICINE

## 2017-11-29 PROCEDURE — 85025 COMPLETE CBC W/AUTO DIFF WBC: CPT | Performed by: FAMILY MEDICINE

## 2017-11-29 NOTE — PATIENT INSTRUCTIONS
Symptomatic care with decongestants, fluids, tylenol/advil prn. Use GUAIFENESIN  MG OR TBCR, 1 tab po BID (Twice per day), D: 20, R: 0 for congestion and cough.    In addition, I have suggested that the patient   monitor for symptoms of bacterial infection expecting slow gradual resolution of viral URI as the natural course.   Good skin care

## 2017-11-29 NOTE — MR AVS SNAPSHOT
After Visit Summary   11/29/2017    Mirta Decker    MRN: [id number]           Patient Information     Date Of Birth          1965        Visit Information        Provider Department      11/29/2017 11:15 AM Yani Martin MD [facility]        Today's Diagnoses     Viral upper respiratory tract infection    -  1    Psoriatic arthropathy (H)          Care Instructions    Symptomatic care with decongestants, fluids, tylenol/advil prn. Use GUAIFENESIN  MG OR TBCR, 1 tab po BID (Twice per day), D: 20, R: 0 for congestion and cough.    In addition, I have suggested that the patient   monitor for symptoms of bacterial infection expecting slow gradual resolution of viral URI as the natural course.   Good skin care          Follow-ups after your visit        Follow-up notes from your care team     Return if symptoms worsen or fail to improve.      Who to contact     If you have questions or need follow up information about today's clinic visit or your schedule please contact [facility] directly at [phone].  Normal or non-critical lab and imaging results will be communicated to you by [employer], letter or phone within 4 business days after the clinic has received the results. If you do not hear from us within 7 days, please contact the clinic through [employer] or phone. If you have a critical or abnormal lab result, we will notify you by phone as soon as possible.  Submit refill requests through [employer] or call your pharmacy and they will forward the refill request to us. Please allow 3 business days for your refill to be completed.          Additional Information About Your Visit        MyChart Information     [employer] gives you secure access to your electronic health record. If you see a primary care provider, you can also send messages to your care team and make appointments. If you have questions, please call your primary care clinic.  If you do  not have a primary care provider, please call [phone] and they will assist you.        Care EveryWhere ID     This is your Care EveryWhere ID. This could be used by other organizations to access your [facility] medical records  [id number]        Your Vitals Were     Pulse Temperature Respirations Pulse Oximetry          84 98.2  F (36.8  C) (Oral) 16 92%         Blood Pressure from Last 3 Encounters:   11/29/17 138/88   10/26/17 139/88   07/25/17 112/72    Weight from Last 3 Encounters:   07/11/17 (!) 161.9 kg (357 lb)   01/14/17 (!) 156.9 kg (346 lb)   08/19/16 (!) 147 kg (324 lb)              We Performed the Following     CL AFF HEMOGRAM/PLATE/DIFF (BFP)     VENOUS COLLECTION        Primary Care Provider Office Phone # Fax #    Yani Martin [phone] [address] [address]        Equal Access to Services     [facility] : Hadii aad ku hadasho Soomaali, waaxda luqadaha, qaybta kaalmada adeegyada, waxay parminderin haybrad mason . So [facility] [phone].    ATENCIÓN: Si habla español, tiene a gant disposición servicios gratuitos de asistencia lingüística. Ll[facility] [phone].    We comply with applicable federal civil rights laws and [place] laws. We do not discriminate on the basis of race, color, national origin, age, disability, sex, sexual orientation, or gender identity.            Thank you!     Thank you for choosing [facility]  for your care. Our goal is always to provide you with excellent care. Hearing back from our patients is one way we can continue to improve our services. Please take a few minutes to complete the written survey that you may receive in the mail after your visit with us. Thank you!             Your Updated Medication List - Protect others around you: Learn how to safely use, store and throw away your medicines at www.disposemymeds.org.          This list is accurate as of: 11/29/17 12:20 PM.   Always use your most recent med list.                   Brand Name Dispense Instructions for use Diagnosis    atenolol 25 MG tablet    TENORMIN     Take 1 tablet (25 mg) by mouth 2 times daily    Essential hypertension, benign       calcipotriene 0.005 % cream    DOVONOX     Apply topically 2 times daily    Psoriatic arthropathy (H)       CULTURELLE DIGESTIVE HEALTH Caps       Cellulitis of right lower extremity       * folic acid 400 MCG tablet    FOLVITE     Take 1 tablet (400 mcg) by mouth daily    Psoriatic arthropathy (H)       * folic acid 1 MG tablet    FOLVITE          GOLD BOND Powd      Externally apply topically daily as needed        IBUPROFEN PO      Take 200-800 mg by mouth every 8 hours as needed for moderate pain        LOPROX 0.77 % Gel   Generic drug:  ciclopirox      Externally apply topically daily as needed (for psoriasis)    Psoriatic arthropathy (H)       losartan-hydrochlorothiazide 100-25 MG per tablet    HYZAAR    90 tablet    Take 1 tablet by mouth daily    Essential hypertension, benign, Localized edema       Methotrexate (PF) 25 MG/0.5ML Soaj      Inject Subcutaneous once a week    Psoriatic arthropathy (H)       ondansetron 4 MG ODT tab    ZOFRAN-ODT    30 tablet    Take 1 tablet (4 mg) by mouth every 6 hours as needed for nausea    Cellulitis of right lower extremity       OTEZLA 30 MG tablet   Generic drug:  apremilast      Take 30 mg by mouth 2 times daily    Psoriatic arthropathy (H)       prochlorperazine 5 MG tablet    COMPAZINE    30 tablet    Take 1-2 tablets (5-10 mg) by mouth every 6 hours as needed for nausea or vomiting    Cellulitis of right lower extremity       TYLENOL EXTRA STRENGTH PO      Take 1,000 mg by mouth 2 times daily as needed (arthritis)        UNABLE TO FIND      MEDICATION NAME: Cuticort Spray 1% from derm - BID for 3 wks    Psoriatic arthropathy (H)       vitamin D 2000 UNITS tablet      Take 2,000 Units by mouth daily    Psoriatic arthropathy (H)       *  Notice:  This list has 2 medication(s) that are the same as other medications prescribed for you. Read the directions carefully, and ask your doctor or other care provider to review them with you.

## 2017-11-29 NOTE — NURSING NOTE
Patient started feeling ill since Friday - has not running a fever - in bed Sat and Sun had the chills and achy. Then on Monday started having breathing issues.  Pre-Visit Screening not done today.  Pulse - regular  My Chart - accepts    CLASSIFICATION OF OVERWEIGHT AND OBESITY BY BMI                         Obesity Class           BMI(kg/m2)  Underweight                                    < 18.5  Normal                                         18.5-24.9  Overweight                                     25.0-29.9  OBESITY                     I                  30.0-34.9                              II                 35.0-39.9  EXTREME OBESITY             III                >40                             Patient's  BMI There is no height or weight on file to calculate BMI.  http://hin.nhlbi.nih.gov/menuplanner/menu.cgi  Questioned patient about current smoking habits.  Pt. has never smoked.  The patient has verbalized that it is ok to leave a detailed voice message on the patient's cell phone   Telephone Information:   Mobile [phone]      with results/recommendations from this visit.       Patient was unable to have their weight and height checked for the following reason feeling ill and because of this we are not able to calculate a BMI.

## 2017-11-29 NOTE — PROGRESS NOTES
SUBJECTIVE: 52 year old female complaining of nasal congestion, fatigue and body aches for 5 day(s).   The patient describes started hours after her flu immunization at work.   The patient denies a history of chills, fever or GI complaints now.   Smoking history: No.   Relevant past medical history: positive for psoriatic arthritis with multiple cellulitis issues on the back of her knees/ feels well now.  doing daily care.    OBJECTIVE: The patient appears healthy, alert, no distress, cooperative, smiling and over weight.   EARS: negative  NOSE/SINUS: positive findings: mucosa erythematous and swollen, clear rhinorrhea   THROAT: normal and post nasal drainage   NECK:Neck supple. No adenopathy. Thyroid symmetric, normal size,, Carotids without bruits.   CHEST: Clear  SKIN: Psoriatic plaques scattered. Post knee fold panniculus with minimal erythema and no edema. No drainage  EXT: The lower extremities are normal and reveal no sign of DVT. Calves and thighs are soft and non tender, color is normal, no swelling or redness. Sophia's sign is negative.  Pedal pulses are normal.    CBC: WNL    ASSESSMENT: (J06.9,  B97.89) Viral upper respiratory tract infection  (primary encounter diagnosis)  Plan: CL AFF HEMOGRAM/PLATE/DIFF (BFP), VENOUS         COLLECTION        Symptomatic care with decongestants, fluids, tylenol/advil prn. Use GUAIFENESIN  MG OR TBCR, 1 tab po BID (Twice per day), D: 20, R: 0 for congestion and cough.    In addition, I have suggested that the patient   monitor for symptoms of bacterial infection expecting slow gradual resolution of viral URI as the natural course.      (L40.50) Psoriatic arthropathy (H)  Plan: CL AFF HEMOGRAM/PLATE/DIFF (BFP), VENOUS         COLLECTION

## 2018-03-16 ENCOUNTER — TRANSFERRED RECORDS (OUTPATIENT)
Dept: FAMILY MEDICINE | Facility: CLINIC | Age: 53
End: 2018-03-16

## 2018-03-19 ENCOUNTER — AMBULATORY - HEALTHEAST (OUTPATIENT)
Dept: OTHER | Facility: CLINIC | Age: 53
End: 2018-03-19

## 2018-03-20 ENCOUNTER — TRANSFERRED RECORDS (OUTPATIENT)
Dept: FAMILY MEDICINE | Facility: CLINIC | Age: 53
End: 2018-03-20

## 2018-04-09 ENCOUNTER — TRANSFERRED RECORDS (OUTPATIENT)
Dept: FAMILY MEDICINE | Facility: CLINIC | Age: 53
End: 2018-04-09

## 2018-04-19 ENCOUNTER — TRANSFERRED RECORDS (OUTPATIENT)
Dept: FAMILY MEDICINE | Facility: CLINIC | Age: 53
End: 2018-04-19

## 2018-04-26 ENCOUNTER — AMBULATORY - HEALTHEAST (OUTPATIENT)
Dept: SLEEP MEDICINE | Facility: CLINIC | Age: 53
End: 2018-04-26

## 2018-04-30 ENCOUNTER — TRANSFERRED RECORDS (OUTPATIENT)
Dept: FAMILY MEDICINE | Facility: CLINIC | Age: 53
End: 2018-04-30

## 2018-05-01 ENCOUNTER — NURSING HOME VISIT (OUTPATIENT)
Dept: GERIATRICS | Facility: CLINIC | Age: 53
End: 2018-05-01
Payer: COMMERCIAL

## 2018-05-01 VITALS
SYSTOLIC BLOOD PRESSURE: 135 MMHG | TEMPERATURE: 97.9 F | RESPIRATION RATE: 20 BRPM | HEART RATE: 77 BPM | OXYGEN SATURATION: 94 % | DIASTOLIC BLOOD PRESSURE: 71 MMHG

## 2018-05-01 DIAGNOSIS — B37.2 YEAST INFECTION OF THE SKIN: ICD-10-CM

## 2018-05-01 DIAGNOSIS — K59.01 SLOW TRANSIT CONSTIPATION: ICD-10-CM

## 2018-05-01 DIAGNOSIS — J94.2 HEMOTHORAX, RIGHT: ICD-10-CM

## 2018-05-01 DIAGNOSIS — S82.892D CLOSED FRACTURE OF BOTH ANKLES WITH ROUTINE HEALING: ICD-10-CM

## 2018-05-01 DIAGNOSIS — S22.071D CLOSED STABLE BURST FRACTURE OF NINTH THORACIC VERTEBRA WITH ROUTINE HEALING: ICD-10-CM

## 2018-05-01 DIAGNOSIS — Z71.89 ACP (ADVANCE CARE PLANNING): ICD-10-CM

## 2018-05-01 DIAGNOSIS — F41.9 ANXIETY: ICD-10-CM

## 2018-05-01 DIAGNOSIS — S82.891D CLOSED FRACTURE OF BOTH ANKLES WITH ROUTINE HEALING: ICD-10-CM

## 2018-05-01 DIAGNOSIS — E66.01 MORBID OBESITY WITH BMI OF 60.0-69.9, ADULT (H): ICD-10-CM

## 2018-05-01 DIAGNOSIS — L40.50 PSORIATIC ARTHROPATHY (H): ICD-10-CM

## 2018-05-01 DIAGNOSIS — I10 ESSENTIAL HYPERTENSION, BENIGN: ICD-10-CM

## 2018-05-01 DIAGNOSIS — J96.01 ACUTE RESPIRATORY FAILURE WITH HYPOXIA (H): Primary | ICD-10-CM

## 2018-05-01 DIAGNOSIS — D64.9 ANEMIA, UNSPECIFIED TYPE: ICD-10-CM

## 2018-05-01 DIAGNOSIS — S22.068D OTHER CLOSED FRACTURE OF EIGHTH THORACIC VERTEBRA WITH ROUTINE HEALING, SUBSEQUENT ENCOUNTER: ICD-10-CM

## 2018-05-01 DIAGNOSIS — R53.81 PHYSICAL DECONDITIONING: ICD-10-CM

## 2018-05-01 DIAGNOSIS — F32.A DEPRESSION, UNSPECIFIED DEPRESSION TYPE: ICD-10-CM
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
## 2018-05-01 PROCEDURE — 99310 SBSQ NF CARE HIGH MDM 45: CPT | Performed by: NURSE PRACTITIONER

## 2018-05-01 RX ORDER — MULTIPLE VITAMINS W/ MINERALS TAB 9MG-400MCG
1 TAB ORAL DAILY
COMMUNITY
End: 2018-07-27

## 2018-05-01 RX ORDER — POLYETHYLENE GLYCOL 3350 17 G/17G
17 POWDER, FOR SOLUTION ORAL DAILY
COMMUNITY
End: 2018-07-27

## 2018-05-01 RX ORDER — ASCORBIC ACID 500 MG
500 TABLET ORAL 2 TIMES DAILY
COMMUNITY
End: 2018-07-27

## 2018-05-01 RX ORDER — ACETAMINOPHEN 650 MG/20.3ML
20.3 LIQUID ORAL EVERY 6 HOURS
COMMUNITY
End: 2018-05-29

## 2018-05-01 RX ORDER — SIMETHICONE 80 MG
80 TABLET,CHEWABLE ORAL EVERY 6 HOURS PRN
COMMUNITY
End: 2018-10-30

## 2018-05-01 RX ORDER — IPRATROPIUM BROMIDE AND ALBUTEROL SULFATE 2.5; .5 MG/3ML; MG/3ML
1 SOLUTION RESPIRATORY (INHALATION) 3 TIMES DAILY
COMMUNITY
End: 2018-07-27
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
## 2018-05-02 ENCOUNTER — HOSPITAL LABORATORY (OUTPATIENT)
Dept: OTHER | Facility: CLINIC | Age: 53
End: 2018-05-02

## 2018-05-02 LAB
ANION GAP SERPL CALCULATED.3IONS-SCNC: 5 MMOL/L (ref 3–14)
BUN SERPL-MCNC: 15 MG/DL (ref 7–30)
CALCIUM SERPL-MCNC: 10.2 MG/DL (ref 8.5–10.1)
CHLORIDE SERPL-SCNC: 98 MMOL/L (ref 94–109)
CO2 SERPL-SCNC: 35 MMOL/L (ref 20–32)
CREAT SERPL-MCNC: 0.51 MG/DL (ref 0.52–1.04)
ERYTHROCYTE [DISTWIDTH] IN BLOOD BY AUTOMATED COUNT: 16.9 % (ref 10–15)
GFR SERPL CREATININE-BSD FRML MDRD: >90 ML/MIN/1.7M2
GLUCOSE SERPL-MCNC: 111 MG/DL (ref 70–99)
HCT VFR BLD AUTO: 35.2 % (ref 35–47)
HGB BLD-MCNC: 10.4 G/DL (ref 11.7–15.7)
MCH RBC QN AUTO: 25.7 PG (ref 26.5–33)
MCHC RBC AUTO-ENTMCNC: 29.5 G/DL (ref 31.5–36.5)
MCV RBC AUTO: 87 FL (ref 78–100)
PLATELET # BLD AUTO: 277 10E9/L (ref 150–450)
POTASSIUM SERPL-SCNC: 3.6 MMOL/L (ref 3.4–5.3)
RBC # BLD AUTO: 4.05 10E12/L (ref 3.8–5.2)
SODIUM SERPL-SCNC: 138 MMOL/L (ref 133–144)
WBC # BLD AUTO: 7.3 10E9/L (ref 4–11)

## 2018-05-04 ENCOUNTER — NURSING HOME VISIT (OUTPATIENT)
Dept: GERIATRICS | Facility: CLINIC | Age: 53
End: 2018-05-04
Payer: COMMERCIAL

## 2018-05-04 VITALS
DIASTOLIC BLOOD PRESSURE: 81 MMHG | WEIGHT: 293 LBS | OXYGEN SATURATION: 97 % | HEART RATE: 73 BPM | TEMPERATURE: 98.4 F | RESPIRATION RATE: 18 BRPM | BODY MASS INDEX: 61.17 KG/M2 | SYSTOLIC BLOOD PRESSURE: 137 MMHG

## 2018-05-04 DIAGNOSIS — S82.892D CLOSED FRACTURE OF BOTH ANKLES WITH ROUTINE HEALING: Primary | ICD-10-CM

## 2018-05-04 DIAGNOSIS — S22.068D OTHER CLOSED FRACTURE OF EIGHTH THORACIC VERTEBRA WITH ROUTINE HEALING, SUBSEQUENT ENCOUNTER: ICD-10-CM

## 2018-05-04 DIAGNOSIS — R53.81 PHYSICAL DECONDITIONING: ICD-10-CM

## 2018-05-04 DIAGNOSIS — L40.50 PSORIATIC ARTHROPATHY (H): ICD-10-CM

## 2018-05-04 DIAGNOSIS — S22.071D CLOSED STABLE BURST FRACTURE OF NINTH THORACIC VERTEBRA WITH ROUTINE HEALING: ICD-10-CM

## 2018-05-04 DIAGNOSIS — I10 ESSENTIAL HYPERTENSION, BENIGN: ICD-10-CM

## 2018-05-04 DIAGNOSIS — F41.9 ANXIETY: ICD-10-CM

## 2018-05-04 DIAGNOSIS — E66.01 MORBID OBESITY WITH BMI OF 60.0-69.9, ADULT (H): ICD-10-CM

## 2018-05-04 DIAGNOSIS — F32.A DEPRESSION, UNSPECIFIED DEPRESSION TYPE: ICD-10-CM

## 2018-05-04 DIAGNOSIS — S82.891D CLOSED FRACTURE OF BOTH ANKLES WITH ROUTINE HEALING: Primary | ICD-10-CM

## 2018-05-04 DIAGNOSIS — D64.9 ANEMIA, UNSPECIFIED TYPE: ICD-10-CM

## 2018-05-04 DIAGNOSIS — J94.2 HEMOTHORAX, RIGHT: ICD-10-CM

## 2018-05-04 PROCEDURE — 99309 SBSQ NF CARE MODERATE MDM 30: CPT | Performed by: NURSE PRACTITIONER

## 2018-05-04 RX ORDER — FERROUS SULFATE 325(65) MG
325 TABLET ORAL DAILY
COMMUNITY
End: 2018-07-27

## 2018-05-04 RX ORDER — NYSTATIN 100000 [USP'U]/G
POWDER TOPICAL 2 TIMES DAILY
COMMUNITY
End: 2018-07-06

## 2018-05-04 RX ORDER — CHLORHEXIDINE GLUCONATE 20 %
15 SOLUTION, NON-ORAL MISCELLANEOUS 2 TIMES DAILY
COMMUNITY
End: 2018-07-06
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
## 2018-05-05 PROBLEM — S82.892D CLOSED FRACTURE OF BOTH ANKLES WITH ROUTINE HEALING: Status: ACTIVE | Noted: 2018-05-05

## 2018-05-05 PROBLEM — B37.2 YEAST INFECTION OF THE SKIN: Status: ACTIVE | Noted: 2018-05-05

## 2018-05-05 PROBLEM — D64.9 ANEMIA: Status: ACTIVE | Noted: 2018-05-05

## 2018-05-05 PROBLEM — R53.81 PHYSICAL DECONDITIONING: Status: ACTIVE | Noted: 2018-05-05

## 2018-05-05 PROBLEM — S82.891D CLOSED FRACTURE OF BOTH ANKLES WITH ROUTINE HEALING: Status: ACTIVE | Noted: 2018-05-05

## 2018-05-05 PROBLEM — K59.01 SLOW TRANSIT CONSTIPATION: Status: ACTIVE | Noted: 2018-05-05

## 2018-05-05 PROBLEM — J94.2 HEMOTHORAX, RIGHT: Status: ACTIVE | Noted: 2018-05-05

## 2018-05-05 PROBLEM — J96.01 ACUTE RESPIRATORY FAILURE WITH HYPOXIA (H): Status: ACTIVE | Noted: 2018-05-05

## 2018-05-05 PROBLEM — S22.071D: Status: ACTIVE | Noted: 2018-05-05

## 2018-05-05 PROBLEM — S22.069D CLOSED FRACTURE OF EIGHTH THORACIC VERTEBRA WITH ROUTINE HEALING: Status: ACTIVE | Noted: 2018-05-05

## 2018-05-08 ENCOUNTER — HOSPITAL LABORATORY (OUTPATIENT)
Dept: OTHER | Facility: CLINIC | Age: 53
End: 2018-05-08

## 2018-05-08 ENCOUNTER — NURSING HOME VISIT (OUTPATIENT)
Dept: GERIATRICS | Facility: CLINIC | Age: 53
End: 2018-05-08
Payer: COMMERCIAL

## 2018-05-08 VITALS
SYSTOLIC BLOOD PRESSURE: 123 MMHG | OXYGEN SATURATION: 92 % | BODY MASS INDEX: 62.26 KG/M2 | HEART RATE: 82 BPM | DIASTOLIC BLOOD PRESSURE: 73 MMHG | WEIGHT: 293 LBS | TEMPERATURE: 98.8 F | RESPIRATION RATE: 18 BRPM

## 2018-05-08 DIAGNOSIS — E66.01 MORBID OBESITY WITH BMI OF 60.0-69.9, ADULT (H): ICD-10-CM

## 2018-05-08 DIAGNOSIS — I10 ESSENTIAL HYPERTENSION, BENIGN: ICD-10-CM

## 2018-05-08 DIAGNOSIS — S82.891D CLOSED FRACTURE OF BOTH ANKLES WITH ROUTINE HEALING: ICD-10-CM

## 2018-05-08 DIAGNOSIS — J94.2 HEMOTHORAX, RIGHT: ICD-10-CM

## 2018-05-08 DIAGNOSIS — J96.01 ACUTE RESPIRATORY FAILURE WITH HYPOXIA (H): Primary | ICD-10-CM

## 2018-05-08 DIAGNOSIS — F32.9 REACTIVE DEPRESSION: ICD-10-CM

## 2018-05-08 DIAGNOSIS — L40.50 PSORIATIC ARTHROPATHY (H): ICD-10-CM

## 2018-05-08 DIAGNOSIS — S82.892D CLOSED FRACTURE OF BOTH ANKLES WITH ROUTINE HEALING: ICD-10-CM

## 2018-05-08 DIAGNOSIS — R21 FACIAL RASH: ICD-10-CM

## 2018-05-08 DIAGNOSIS — S22.071D CLOSED STABLE BURST FRACTURE OF NINTH THORACIC VERTEBRA WITH ROUTINE HEALING: ICD-10-CM

## 2018-05-08 LAB
ANION GAP SERPL CALCULATED.3IONS-SCNC: 6 MMOL/L (ref 3–14)
BUN SERPL-MCNC: 19 MG/DL (ref 7–30)
CALCIUM SERPL-MCNC: 9.5 MG/DL (ref 8.5–10.1)
CHLORIDE SERPL-SCNC: 97 MMOL/L (ref 94–109)
CO2 SERPL-SCNC: 34 MMOL/L (ref 20–32)
CREAT SERPL-MCNC: 0.54 MG/DL (ref 0.52–1.04)
GFR SERPL CREATININE-BSD FRML MDRD: >90 ML/MIN/1.7M2
GLUCOSE SERPL-MCNC: 102 MG/DL (ref 70–99)
HGB BLD-MCNC: 9.9 G/DL (ref 11.7–15.7)
POTASSIUM SERPL-SCNC: 3.4 MMOL/L (ref 3.4–5.3)
SODIUM SERPL-SCNC: 137 MMOL/L (ref 133–144)

## 2018-05-08 PROCEDURE — 99306 1ST NF CARE HIGH MDM 50: CPT | Performed by: INTERNAL MEDICINE
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
## 2018-05-09 ENCOUNTER — NURSING HOME VISIT (OUTPATIENT)
Dept: GERIATRICS | Facility: CLINIC | Age: 53
End: 2018-05-09
Payer: COMMERCIAL

## 2018-05-09 VITALS
TEMPERATURE: 98.8 F | HEART RATE: 82 BPM | OXYGEN SATURATION: 93 % | DIASTOLIC BLOOD PRESSURE: 73 MMHG | WEIGHT: 293 LBS | HEIGHT: 60 IN | RESPIRATION RATE: 18 BRPM | BODY MASS INDEX: 57.52 KG/M2 | SYSTOLIC BLOOD PRESSURE: 123 MMHG

## 2018-05-09 DIAGNOSIS — B37.2 YEAST INFECTION OF THE SKIN: Primary | ICD-10-CM

## 2018-05-09 PROCEDURE — 99308 SBSQ NF CARE LOW MDM 20: CPT | Performed by: NURSE PRACTITIONER
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
## 2018-05-11 ENCOUNTER — NURSING HOME VISIT (OUTPATIENT)
Dept: GERIATRICS | Facility: CLINIC | Age: 53
End: 2018-05-11
Payer: COMMERCIAL

## 2018-05-11 VITALS
OXYGEN SATURATION: 92 % | HEART RATE: 88 BPM | DIASTOLIC BLOOD PRESSURE: 96 MMHG | SYSTOLIC BLOOD PRESSURE: 165 MMHG | RESPIRATION RATE: 16 BRPM | TEMPERATURE: 98.8 F | WEIGHT: 293 LBS | BODY MASS INDEX: 61.23 KG/M2

## 2018-05-11 DIAGNOSIS — J94.2 HEMOTHORAX, RIGHT: ICD-10-CM

## 2018-05-11 DIAGNOSIS — S22.071D CLOSED STABLE BURST FRACTURE OF NINTH THORACIC VERTEBRA WITH ROUTINE HEALING: ICD-10-CM

## 2018-05-11 DIAGNOSIS — J30.1 ACUTE SEASONAL ALLERGIC RHINITIS DUE TO POLLEN: Primary | ICD-10-CM

## 2018-05-11 DIAGNOSIS — S82.891D CLOSED FRACTURE OF BOTH ANKLES WITH ROUTINE HEALING: ICD-10-CM

## 2018-05-11 DIAGNOSIS — I10 ESSENTIAL HYPERTENSION, BENIGN: ICD-10-CM

## 2018-05-11 DIAGNOSIS — D64.9 ANEMIA, UNSPECIFIED TYPE: ICD-10-CM

## 2018-05-11 DIAGNOSIS — B37.2 YEAST INFECTION OF THE SKIN: ICD-10-CM

## 2018-05-11 DIAGNOSIS — R53.81 PHYSICAL DECONDITIONING: ICD-10-CM

## 2018-05-11 DIAGNOSIS — S82.892D CLOSED FRACTURE OF BOTH ANKLES WITH ROUTINE HEALING: ICD-10-CM

## 2018-05-11 PROCEDURE — 99309 SBSQ NF CARE MODERATE MDM 30: CPT | Performed by: NURSE PRACTITIONER

## 2018-05-11 RX ORDER — CETIRIZINE HYDROCHLORIDE 10 MG/1
10 TABLET ORAL DAILY
COMMUNITY
Start: 2018-05-11 | End: 2018-07-27
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
## 2018-05-14 ENCOUNTER — HOSPITAL LABORATORY (OUTPATIENT)
Dept: OTHER | Facility: CLINIC | Age: 53
End: 2018-05-14

## 2018-05-14 ENCOUNTER — NURSING HOME VISIT (OUTPATIENT)
Dept: GERIATRICS | Facility: CLINIC | Age: 53
End: 2018-05-14
Payer: COMMERCIAL

## 2018-05-14 VITALS
OXYGEN SATURATION: 91 % | WEIGHT: 293 LBS | RESPIRATION RATE: 16 BRPM | HEART RATE: 93 BPM | BODY MASS INDEX: 61.23 KG/M2 | SYSTOLIC BLOOD PRESSURE: 126 MMHG | DIASTOLIC BLOOD PRESSURE: 72 MMHG | TEMPERATURE: 98.1 F

## 2018-05-14 DIAGNOSIS — R53.81 PHYSICAL DECONDITIONING: ICD-10-CM

## 2018-05-14 DIAGNOSIS — S22.071D CLOSED STABLE BURST FRACTURE OF NINTH THORACIC VERTEBRA WITH ROUTINE HEALING: ICD-10-CM

## 2018-05-14 DIAGNOSIS — S22.068D OTHER CLOSED FRACTURE OF EIGHTH THORACIC VERTEBRA WITH ROUTINE HEALING, SUBSEQUENT ENCOUNTER: ICD-10-CM

## 2018-05-14 DIAGNOSIS — S82.891D CLOSED FRACTURE OF BOTH ANKLES WITH ROUTINE HEALING: ICD-10-CM

## 2018-05-14 DIAGNOSIS — F41.9 ANXIETY: ICD-10-CM

## 2018-05-14 DIAGNOSIS — D64.9 ANEMIA, UNSPECIFIED TYPE: ICD-10-CM

## 2018-05-14 DIAGNOSIS — S82.892D CLOSED FRACTURE OF BOTH ANKLES WITH ROUTINE HEALING: ICD-10-CM

## 2018-05-14 DIAGNOSIS — J18.9 PNEUMONIA OF RIGHT MIDDLE LOBE DUE TO INFECTIOUS ORGANISM: Primary | ICD-10-CM

## 2018-05-14 DIAGNOSIS — J94.2 HEMOTHORAX, RIGHT: ICD-10-CM

## 2018-05-14 LAB
ANION GAP SERPL CALCULATED.3IONS-SCNC: 3 MMOL/L (ref 3–14)
BUN SERPL-MCNC: 19 MG/DL (ref 7–30)
CALCIUM SERPL-MCNC: 9.3 MG/DL (ref 8.5–10.1)
CHLORIDE SERPL-SCNC: 98 MMOL/L (ref 94–109)
CO2 SERPL-SCNC: 35 MMOL/L (ref 20–32)
CREAT SERPL-MCNC: 0.62 MG/DL (ref 0.52–1.04)
ERYTHROCYTE [DISTWIDTH] IN BLOOD BY AUTOMATED COUNT: 17.2 % (ref 10–15)
FERRITIN SERPL-MCNC: 185 NG/ML (ref 8–252)
FOLATE SERPL-MCNC: 26.9 NG/ML
GFR SERPL CREATININE-BSD FRML MDRD: >90 ML/MIN/1.7M2
GLUCOSE SERPL-MCNC: 100 MG/DL (ref 70–99)
HCT VFR BLD AUTO: 33.4 % (ref 35–47)
HGB BLD-MCNC: 9.7 G/DL (ref 11.7–15.7)
IRON SATN MFR SERPL: 13 % (ref 15–46)
IRON SERPL-MCNC: 28 UG/DL (ref 35–180)
MCH RBC QN AUTO: 25.5 PG (ref 26.5–33)
MCHC RBC AUTO-ENTMCNC: 29 G/DL (ref 31.5–36.5)
MCV RBC AUTO: 88 FL (ref 78–100)
PLATELET # BLD AUTO: 213 10E9/L (ref 150–450)
POTASSIUM SERPL-SCNC: 3.8 MMOL/L (ref 3.4–5.3)
RBC # BLD AUTO: 3.8 10E12/L (ref 3.8–5.2)
SODIUM SERPL-SCNC: 136 MMOL/L (ref 133–144)
TIBC SERPL-MCNC: 216 UG/DL (ref 240–430)
TRANSFERRIN SERPL-MCNC: 177 MG/DL (ref 210–360)
VIT B12 SERPL-MCNC: 359 PG/ML (ref 193–986)
WBC # BLD AUTO: 4.5 10E9/L (ref 4–11)

## 2018-05-14 PROCEDURE — 99310 SBSQ NF CARE HIGH MDM 45: CPT | Performed by: NURSE PRACTITIONER

## 2018-05-14 RX ORDER — BUDESONIDE 0.5 MG/2ML
0.5 INHALANT ORAL 2 TIMES DAILY
COMMUNITY
End: 2018-07-27
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
## 2018-05-16 ENCOUNTER — NURSING HOME VISIT (OUTPATIENT)
Dept: GERIATRICS | Facility: CLINIC | Age: 53
End: 2018-05-16
Payer: COMMERCIAL

## 2018-05-16 VITALS
DIASTOLIC BLOOD PRESSURE: 75 MMHG | WEIGHT: 293 LBS | TEMPERATURE: 97.7 F | HEART RATE: 86 BPM | BODY MASS INDEX: 61.23 KG/M2 | OXYGEN SATURATION: 95 % | SYSTOLIC BLOOD PRESSURE: 124 MMHG | RESPIRATION RATE: 16 BRPM

## 2018-05-16 DIAGNOSIS — R60.0 LOCALIZED EDEMA: ICD-10-CM

## 2018-05-16 DIAGNOSIS — S22.071D CLOSED STABLE BURST FRACTURE OF NINTH THORACIC VERTEBRA WITH ROUTINE HEALING: ICD-10-CM

## 2018-05-16 DIAGNOSIS — R53.81 PHYSICAL DECONDITIONING: ICD-10-CM

## 2018-05-16 DIAGNOSIS — J18.9 PNEUMONIA OF RIGHT MIDDLE LOBE DUE TO INFECTIOUS ORGANISM: Primary | ICD-10-CM

## 2018-05-16 DIAGNOSIS — S22.068D OTHER CLOSED FRACTURE OF EIGHTH THORACIC VERTEBRA WITH ROUTINE HEALING, SUBSEQUENT ENCOUNTER: ICD-10-CM

## 2018-05-16 DIAGNOSIS — J94.2 HEMOTHORAX, RIGHT: ICD-10-CM

## 2018-05-16 DIAGNOSIS — S82.891D CLOSED FRACTURE OF BOTH ANKLES WITH ROUTINE HEALING: ICD-10-CM

## 2018-05-16 DIAGNOSIS — S82.892D CLOSED FRACTURE OF BOTH ANKLES WITH ROUTINE HEALING: ICD-10-CM

## 2018-05-16 DIAGNOSIS — F41.9 ANXIETY: ICD-10-CM

## 2018-05-16 DIAGNOSIS — F32.9 REACTIVE DEPRESSION: ICD-10-CM

## 2018-05-16 DIAGNOSIS — E66.01 MORBID OBESITY WITH BMI OF 60.0-69.9, ADULT (H): ICD-10-CM

## 2018-05-16 DIAGNOSIS — I10 ESSENTIAL HYPERTENSION, BENIGN: ICD-10-CM

## 2018-05-16 PROCEDURE — 99309 SBSQ NF CARE MODERATE MDM 30: CPT | Performed by: NURSE PRACTITIONER
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
## 2018-05-18 ENCOUNTER — NURSING HOME VISIT (OUTPATIENT)
Dept: GERIATRICS | Facility: CLINIC | Age: 53
End: 2018-05-18
Payer: COMMERCIAL

## 2018-05-18 VITALS
HEART RATE: 91 BPM | TEMPERATURE: 98 F | WEIGHT: 293 LBS | DIASTOLIC BLOOD PRESSURE: 78 MMHG | SYSTOLIC BLOOD PRESSURE: 127 MMHG | OXYGEN SATURATION: 95 % | RESPIRATION RATE: 15 BRPM | BODY MASS INDEX: 61.23 KG/M2

## 2018-05-18 DIAGNOSIS — S82.892D CLOSED FRACTURE OF BOTH ANKLES WITH ROUTINE HEALING: ICD-10-CM

## 2018-05-18 DIAGNOSIS — S22.068D OTHER CLOSED FRACTURE OF EIGHTH THORACIC VERTEBRA WITH ROUTINE HEALING, SUBSEQUENT ENCOUNTER: ICD-10-CM

## 2018-05-18 DIAGNOSIS — J94.2 HEMOTHORAX, RIGHT: ICD-10-CM

## 2018-05-18 DIAGNOSIS — I10 ESSENTIAL HYPERTENSION, BENIGN: ICD-10-CM

## 2018-05-18 DIAGNOSIS — S82.891D CLOSED FRACTURE OF BOTH ANKLES WITH ROUTINE HEALING: ICD-10-CM

## 2018-05-18 DIAGNOSIS — F41.9 ANXIETY: ICD-10-CM

## 2018-05-18 DIAGNOSIS — R60.0 BILATERAL LEG EDEMA: ICD-10-CM

## 2018-05-18 DIAGNOSIS — J18.9 PNEUMONIA OF RIGHT MIDDLE LOBE DUE TO INFECTIOUS ORGANISM: Primary | ICD-10-CM

## 2018-05-18 DIAGNOSIS — S22.071D CLOSED STABLE BURST FRACTURE OF NINTH THORACIC VERTEBRA WITH ROUTINE HEALING: ICD-10-CM

## 2018-05-18 DIAGNOSIS — F32.9 REACTIVE DEPRESSION: ICD-10-CM

## 2018-05-18 DIAGNOSIS — R53.81 PHYSICAL DECONDITIONING: ICD-10-CM

## 2018-05-18 PROCEDURE — 99309 SBSQ NF CARE MODERATE MDM 30: CPT | Performed by: NURSE PRACTITIONER
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
## 2018-05-21 ENCOUNTER — NURSING HOME VISIT (OUTPATIENT)
Dept: GERIATRICS | Facility: CLINIC | Age: 53
End: 2018-05-21
Payer: COMMERCIAL

## 2018-05-21 VITALS
TEMPERATURE: 95.6 F | SYSTOLIC BLOOD PRESSURE: 156 MMHG | RESPIRATION RATE: 18 BRPM | BODY MASS INDEX: 59.72 KG/M2 | HEART RATE: 100 BPM | DIASTOLIC BLOOD PRESSURE: 71 MMHG | WEIGHT: 293 LBS | OXYGEN SATURATION: 92 %

## 2018-05-21 DIAGNOSIS — S82.892D CLOSED FRACTURE OF BOTH ANKLES WITH ROUTINE HEALING: ICD-10-CM

## 2018-05-21 DIAGNOSIS — J18.9 PNEUMONIA OF RIGHT MIDDLE LOBE DUE TO INFECTIOUS ORGANISM: Primary | ICD-10-CM

## 2018-05-21 DIAGNOSIS — E66.2 OBESITY HYPOVENTILATION SYNDROME (H): ICD-10-CM

## 2018-05-21 DIAGNOSIS — E66.01 MORBID OBESITY WITH BMI OF 60.0-69.9, ADULT (H): ICD-10-CM

## 2018-05-21 DIAGNOSIS — J96.01 ACUTE RESPIRATORY FAILURE WITH HYPOXIA (H): ICD-10-CM

## 2018-05-21 DIAGNOSIS — S82.891D CLOSED FRACTURE OF BOTH ANKLES WITH ROUTINE HEALING: ICD-10-CM

## 2018-05-21 PROCEDURE — 99309 SBSQ NF CARE MODERATE MDM 30: CPT | Performed by: INTERNAL MEDICINE
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
## 2018-05-22 ENCOUNTER — HOSPITAL LABORATORY (OUTPATIENT)
Dept: OTHER | Facility: CLINIC | Age: 53
End: 2018-05-22

## 2018-05-22 LAB
ANION GAP SERPL CALCULATED.3IONS-SCNC: 5 MMOL/L (ref 3–14)
BUN SERPL-MCNC: 17 MG/DL (ref 7–30)
CALCIUM SERPL-MCNC: 9.5 MG/DL (ref 8.5–10.1)
CHLORIDE SERPL-SCNC: 97 MMOL/L (ref 94–109)
CO2 SERPL-SCNC: 35 MMOL/L (ref 20–32)
CREAT SERPL-MCNC: 0.5 MG/DL (ref 0.52–1.04)
GFR SERPL CREATININE-BSD FRML MDRD: >90 ML/MIN/1.7M2
GLUCOSE SERPL-MCNC: 149 MG/DL (ref 70–99)
HGB BLD-MCNC: 9.8 G/DL (ref 11.7–15.7)
POTASSIUM SERPL-SCNC: 3.4 MMOL/L (ref 3.4–5.3)
SODIUM SERPL-SCNC: 137 MMOL/L (ref 133–144)

## 2018-05-23 ENCOUNTER — NURSING HOME VISIT (OUTPATIENT)
Dept: GERIATRICS | Facility: CLINIC | Age: 53
End: 2018-05-23
Payer: COMMERCIAL

## 2018-05-23 VITALS
BODY MASS INDEX: 61.87 KG/M2 | OXYGEN SATURATION: 93 % | WEIGHT: 293 LBS | HEART RATE: 86 BPM | TEMPERATURE: 97.5 F | RESPIRATION RATE: 24 BRPM | DIASTOLIC BLOOD PRESSURE: 81 MMHG | SYSTOLIC BLOOD PRESSURE: 148 MMHG

## 2018-05-23 DIAGNOSIS — S22.068D OTHER CLOSED FRACTURE OF EIGHTH THORACIC VERTEBRA WITH ROUTINE HEALING, SUBSEQUENT ENCOUNTER: ICD-10-CM

## 2018-05-23 DIAGNOSIS — R53.81 PHYSICAL DECONDITIONING: ICD-10-CM

## 2018-05-23 DIAGNOSIS — I10 ESSENTIAL HYPERTENSION, BENIGN: ICD-10-CM

## 2018-05-23 DIAGNOSIS — S82.891D CLOSED FRACTURE OF BOTH ANKLES WITH ROUTINE HEALING: ICD-10-CM

## 2018-05-23 DIAGNOSIS — R60.0 BILATERAL LEG EDEMA: Primary | ICD-10-CM

## 2018-05-23 DIAGNOSIS — S82.892D CLOSED FRACTURE OF BOTH ANKLES WITH ROUTINE HEALING: ICD-10-CM

## 2018-05-23 DIAGNOSIS — J30.2 CHRONIC SEASONAL ALLERGIC RHINITIS, UNSPECIFIED TRIGGER: ICD-10-CM

## 2018-05-23 DIAGNOSIS — J18.9 PNEUMONIA OF RIGHT MIDDLE LOBE DUE TO INFECTIOUS ORGANISM: ICD-10-CM

## 2018-05-23 DIAGNOSIS — S22.071D CLOSED STABLE BURST FRACTURE OF NINTH THORACIC VERTEBRA WITH ROUTINE HEALING: ICD-10-CM

## 2018-05-23 PROCEDURE — 99310 SBSQ NF CARE HIGH MDM 45: CPT | Performed by: NURSE PRACTITIONER
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
## 2018-05-24 ENCOUNTER — NURSING HOME VISIT (OUTPATIENT)
Dept: GERIATRICS | Facility: CLINIC | Age: 53
End: 2018-05-24
Payer: COMMERCIAL

## 2018-05-24 DIAGNOSIS — S82.891D CLOSED FRACTURE OF BOTH ANKLES WITH ROUTINE HEALING: Primary | ICD-10-CM

## 2018-05-24 DIAGNOSIS — S82.892D CLOSED FRACTURE OF BOTH ANKLES WITH ROUTINE HEALING: Primary | ICD-10-CM
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
## 2018-05-24 NOTE — MR AVS SNAPSHOT
After Visit Summary   5/24/2018    Mirta Decker    MRN: [id number]           Patient Information     Date Of Birth          1965        Visit Information        Provider Department      5/24/2018 10:55 AM GIUSEPPE Mckenzie PA-C [facility]        Today's Diagnoses     Closed fracture of both ankles with routine healing    -  1       Follow-ups after your visit        Additional Services     ORTHOTICS REFERRAL       **This referral order prints off in the [facility]  (Orthotics & Prosthetics) Central Scheduling Office**    The [facility] Central Scheduling Staff will contact the patient to schedule appointments.     Central Scheduling Contact Information: [phone] ([place])    Orthotics: please fit patient with heel lift, RIght shoe, removable, insert. Due to equinus, right foot. Call [phone] to discuss.    Please be aware that coverage of these services is subject to the terms and limitations of your health insurance plan.  Call member services at your health plan with any benefit or coverage questions.      Please bring the following to your appointment:    >>   Any x-rays, CTs or MRIs which have been performed.  Contact the facility where they were done to arrange for  prior to your scheduled appointment.    >>   List of current medications   >>   This referral request   >>   Any documents/labs given to you for this referral                  Who to contact     If you have questions or need follow up information about today's clinic visit or your schedule please contact [facility] directly at [phone].  Normal or non-critical lab and imaging results will be communicated to you by MyChart, letter or phone within 4 business days after the clinic has received the results. If you do not hear from us within 7 days, please contact the clinic through MyChart or phone. If you have a critical or abnormal lab result, we will  notify you by phone as soon as possible.  Submit refill requests through [facility] or call your pharmacy and they will forward the refill request to us. Please allow 3 business days for your refill to be completed.          Additional Information About Your Visit        [facility] Information     [facility] gives you secure access to your electronic health record. If you see a primary care provider, you can also send messages to your care team and make appointments. If you have questions, please call your primary care clinic.  If you do not have a primary care provider, please call [phone] and they will assist you.        Care EveryWhere ID     This is your Care EveryWhere ID. This could be used by other organizations to access your [facility] medical records  [id number]         Blood Pressure from Last 3 Encounters:   05/23/18 148/81   05/21/18 156/71   05/18/18 127/78    Weight from Last 3 Encounters:   05/23/18 316 lb 12.8 oz (143.7 kg)   05/21/18 305 lb 12.8 oz (138.7 kg)   05/18/18 313 lb 8 oz (142.2 kg)              We Performed the Following     ORTHOTICS REFERRAL        Primary Care Provider Office Phone # Fax #    Yani Martin [phone]       [address]  [address] [place] [id number]        Equal Access to Services     LEONCIO DOUGLASS : Hadii aad ku hadasho Soomaali, waaxda luqadaha, qaybta kaalmada adeegyada, bakari zuritain haybrad mason . So [facility] [phone].    ATENCIÓN: Si habla español, tiene a gant disposición servicios gratuitos de asistencia lingüística. Llame al [phone].    We comply with applicable federal civil rights laws and [place] laws. We do not discriminate on the basis of race, color, national origin, age, disability, sex, sexual orientation, or gender identity.            Thank you!     Thank you for choosing [facility]  for your care. Our goal is always to provide you with excellent care. Hearing back from our patients is one  way we can continue to improve our services. Please take a few minutes to complete the written survey that you may receive in the mail after your visit with us. Thank you!             Your Updated Medication List - Protect others around you: Learn how to safely use, store and throw away your medicines at www.disposemymeds.org.          This list is accurate as of 5/24/18 11:10 AM.  Always use your most recent med list.                   Brand Name Dispense Instructions for use Diagnosis    Acetaminophen 650 MG/20.3ML Soln      Take 20.3 mLs by mouth every 6 hours        AQUAPHOR ADVANCED THERAPY Oint      Apply topically 2 times daily Apply to LE/feet all dry skin        ascorbic acid 500 MG Tabs      Take 500 mg by mouth 2 times daily        budesonide 0.5 MG/2ML neb solution    PULMICORT     Take 0.5 mg by nebulization 2 times daily        calcipotriene 0.005 % cream    DOVONOX     Apply topically 2 times daily    Psoriatic arthropathy (H)       cetirizine 10 MG tablet    zyrTEC     Take 10 mg by mouth daily        Chlorhexidine Gluconate Soln      Take 15 mLs by mouth 2 times daily        DIPHENHYDRAMINE HCL PO      Take 50 mg by mouth At Bedtime And daily as needed.        enoxaparin 60 MG/0.6ML injection    LOVENOX     Inject 60 mg Subcutaneous every 12 hours        FAMOTIDINE PO      Take 20 mg by mouth 2 times daily        ferrous sulfate 325 (65 Fe) MG tablet    IRON     Take 325 mg by mouth daily        guaiFENesin 100 MG/5ML Syrp    ROBITUSSIN     Take 10 mLs by mouth every 4 hours as needed for cough And two times daily        HYDRALAZINE HCL PO      Take 50 mg by mouth every 6 hours as needed for high blood pressure        ipratropium - albuterol 0.5 mg/2.5 mg/3 mL 0.5-2.5 (3) MG/3ML neb solution    DUONEB     Take 1 vial by nebulization every 4 hours as needed        LORAZEPAM PO      Take 0.5 mg by mouth every 6 hours as needed for anxiety        LOSARTAN POTASSIUM PO      Take 100 mg by mouth daily         MAGNESIUM OXIDE PO      Take 400 mg by mouth 4 times daily        MELATONIN PO      Take 6 mg by mouth At Bedtime        Multi-vitamin Tabs tablet      Take 1 tablet by mouth daily        nystatin 061861 UNIT/GM Powd    MYCOSTATIN     Apply topically 2 times daily Apply under breasts        ONDANSETRON PO      Take 4 mg by mouth every 6 hours as needed for nausea        OXYCODONE HCL PO      Take 5 mg by mouth every 8 hours as needed        polyethylene glycol Packet    MIRALAX/GLYCOLAX     Take 17 g by mouth daily        sennosides 8.8 MG/5ML syrup    SENOKOT     Take 8.8 mg by mouth daily        SERTRALINE HCL PO      Take 100 mg by mouth daily        simethicone 80 MG chewable tablet    MYLICON     Take 80 mg by mouth every 6 hours as needed for flatulence or cramping        sodium chloride 0.65 % nasal spray    OCEAN     Spray 2 sprays into both nostrils every 4 hours as needed for congestion        TORSEMIDE PO      Take 20 mg by mouth 2 times daily

## 2018-05-28 ENCOUNTER — HOSPITAL LABORATORY (OUTPATIENT)
Dept: OTHER | Facility: CLINIC | Age: 53
End: 2018-05-28

## 2018-05-28 LAB
ANION GAP SERPL CALCULATED.3IONS-SCNC: 6 MMOL/L (ref 3–14)
BUN SERPL-MCNC: 16 MG/DL (ref 7–30)
CALCIUM SERPL-MCNC: 9.2 MG/DL (ref 8.5–10.1)
CHLORIDE SERPL-SCNC: 98 MMOL/L (ref 94–109)
CO2 SERPL-SCNC: 35 MMOL/L (ref 20–32)
CREAT SERPL-MCNC: 0.68 MG/DL (ref 0.52–1.04)
GFR SERPL CREATININE-BSD FRML MDRD: 89 ML/MIN/1.7M2
GLUCOSE SERPL-MCNC: 105 MG/DL (ref 70–99)
POTASSIUM SERPL-SCNC: 3.7 MMOL/L (ref 3.4–5.3)
SODIUM SERPL-SCNC: 139 MMOL/L (ref 133–144)

## 2018-05-29 ENCOUNTER — NURSING HOME VISIT (OUTPATIENT)
Dept: GERIATRICS | Facility: CLINIC | Age: 53
End: 2018-05-29
Payer: COMMERCIAL

## 2018-05-29 VITALS
OXYGEN SATURATION: 93 % | SYSTOLIC BLOOD PRESSURE: 151 MMHG | HEART RATE: 92 BPM | RESPIRATION RATE: 20 BRPM | DIASTOLIC BLOOD PRESSURE: 52 MMHG | BODY MASS INDEX: 62.69 KG/M2 | TEMPERATURE: 98.1 F | WEIGHT: 293 LBS

## 2018-05-29 DIAGNOSIS — F32.9 REACTIVE DEPRESSION: ICD-10-CM

## 2018-05-29 DIAGNOSIS — S82.891D CLOSED FRACTURE OF BOTH ANKLES WITH ROUTINE HEALING: ICD-10-CM

## 2018-05-29 DIAGNOSIS — J94.2 HEMOTHORAX, RIGHT: ICD-10-CM

## 2018-05-29 DIAGNOSIS — J18.9 PNEUMONIA OF RIGHT MIDDLE LOBE DUE TO INFECTIOUS ORGANISM: Primary | ICD-10-CM

## 2018-05-29 DIAGNOSIS — L40.50 PSORIATIC ARTHROPATHY (H): ICD-10-CM

## 2018-05-29 DIAGNOSIS — R53.81 PHYSICAL DECONDITIONING: ICD-10-CM

## 2018-05-29 DIAGNOSIS — S82.892D CLOSED FRACTURE OF BOTH ANKLES WITH ROUTINE HEALING: ICD-10-CM

## 2018-05-29 DIAGNOSIS — B37.2 YEAST INFECTION OF THE SKIN: ICD-10-CM

## 2018-05-29 DIAGNOSIS — R60.0 BILATERAL LEG EDEMA: ICD-10-CM

## 2018-05-29 DIAGNOSIS — E66.01 MORBID OBESITY WITH BMI OF 60.0-69.9, ADULT (H): ICD-10-CM

## 2018-05-29 DIAGNOSIS — F41.9 ANXIETY: ICD-10-CM

## 2018-05-29 DIAGNOSIS — I10 ESSENTIAL HYPERTENSION, BENIGN: ICD-10-CM

## 2018-05-29 PROCEDURE — 99310 SBSQ NF CARE HIGH MDM 45: CPT | Performed by: NURSE PRACTITIONER

## 2018-05-29 RX ORDER — GUAIFENESIN 600 MG/1
600 TABLET, EXTENDED RELEASE ORAL 2 TIMES DAILY
COMMUNITY
End: 2018-07-06

## 2018-05-29 RX ORDER — FLUTICASONE PROPIONATE 50 MCG
1 SPRAY, SUSPENSION (ML) NASAL DAILY
COMMUNITY
End: 2018-07-27
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
## 2018-06-04 ENCOUNTER — NURSING HOME VISIT (OUTPATIENT)
Dept: GERIATRICS | Facility: CLINIC | Age: 53
End: 2018-06-04
Payer: COMMERCIAL

## 2018-06-04 ENCOUNTER — HOSPITAL LABORATORY (OUTPATIENT)
Dept: OTHER | Facility: CLINIC | Age: 53
End: 2018-06-04

## 2018-06-04 VITALS
OXYGEN SATURATION: 93 % | DIASTOLIC BLOOD PRESSURE: 69 MMHG | WEIGHT: 293 LBS | HEART RATE: 79 BPM | SYSTOLIC BLOOD PRESSURE: 137 MMHG | BODY MASS INDEX: 63.12 KG/M2 | RESPIRATION RATE: 18 BRPM | TEMPERATURE: 97.2 F

## 2018-06-04 DIAGNOSIS — J94.2 HEMOTHORAX, RIGHT: ICD-10-CM

## 2018-06-04 DIAGNOSIS — F41.9 ANXIETY: ICD-10-CM

## 2018-06-04 DIAGNOSIS — S22.068D OTHER CLOSED FRACTURE OF EIGHTH THORACIC VERTEBRA WITH ROUTINE HEALING, SUBSEQUENT ENCOUNTER: ICD-10-CM

## 2018-06-04 DIAGNOSIS — S82.892D CLOSED FRACTURE OF BOTH ANKLES WITH ROUTINE HEALING: ICD-10-CM

## 2018-06-04 DIAGNOSIS — S82.891D CLOSED FRACTURE OF BOTH ANKLES WITH ROUTINE HEALING: ICD-10-CM

## 2018-06-04 DIAGNOSIS — D64.9 ANEMIA, UNSPECIFIED TYPE: ICD-10-CM

## 2018-06-04 DIAGNOSIS — R60.0 BILATERAL LEG EDEMA: Primary | ICD-10-CM

## 2018-06-04 DIAGNOSIS — F32.9 REACTIVE DEPRESSION: ICD-10-CM

## 2018-06-04 DIAGNOSIS — R53.81 PHYSICAL DECONDITIONING: ICD-10-CM

## 2018-06-04 DIAGNOSIS — S22.071D CLOSED STABLE BURST FRACTURE OF NINTH THORACIC VERTEBRA WITH ROUTINE HEALING: ICD-10-CM

## 2018-06-04 DIAGNOSIS — I10 ESSENTIAL HYPERTENSION, BENIGN: ICD-10-CM

## 2018-06-04 LAB
ANION GAP SERPL CALCULATED.3IONS-SCNC: 7 MMOL/L (ref 3–14)
BUN SERPL-MCNC: 17 MG/DL (ref 7–30)
CALCIUM SERPL-MCNC: 9.4 MG/DL (ref 8.5–10.1)
CHLORIDE SERPL-SCNC: 101 MMOL/L (ref 94–109)
CO2 SERPL-SCNC: 30 MMOL/L (ref 20–32)
CREAT SERPL-MCNC: 0.67 MG/DL (ref 0.52–1.04)
GFR SERPL CREATININE-BSD FRML MDRD: >90 ML/MIN/1.7M2
GLUCOSE SERPL-MCNC: 122 MG/DL (ref 70–99)
HGB BLD-MCNC: 10.5 G/DL (ref 11.7–15.7)
MAGNESIUM SERPL-MCNC: 2.3 MG/DL (ref 1.6–2.3)
POTASSIUM SERPL-SCNC: 3.7 MMOL/L (ref 3.4–5.3)
SODIUM SERPL-SCNC: 138 MMOL/L (ref 133–144)

## 2018-06-04 PROCEDURE — 99309 SBSQ NF CARE MODERATE MDM 30: CPT | Performed by: NURSE PRACTITIONER
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
## 2018-06-06 ENCOUNTER — HOSPITAL LABORATORY (OUTPATIENT)
Dept: OTHER | Facility: CLINIC | Age: 53
End: 2018-06-06

## 2018-06-06 LAB
ANION GAP SERPL CALCULATED.3IONS-SCNC: 6 MMOL/L (ref 3–14)
BUN SERPL-MCNC: 15 MG/DL (ref 7–30)
CALCIUM SERPL-MCNC: 9.3 MG/DL (ref 8.5–10.1)
CHLORIDE SERPL-SCNC: 102 MMOL/L (ref 94–109)
CO2 SERPL-SCNC: 31 MMOL/L (ref 20–32)
CREAT SERPL-MCNC: 0.68 MG/DL (ref 0.52–1.04)
GFR SERPL CREATININE-BSD FRML MDRD: 89 ML/MIN/1.7M2
GLUCOSE SERPL-MCNC: 90 MG/DL (ref 70–99)
POTASSIUM SERPL-SCNC: 4 MMOL/L (ref 3.4–5.3)
SODIUM SERPL-SCNC: 139 MMOL/L (ref 133–144)

## 2018-06-08 ENCOUNTER — NURSING HOME VISIT (OUTPATIENT)
Dept: GERIATRICS | Facility: CLINIC | Age: 53
End: 2018-06-08
Payer: COMMERCIAL

## 2018-06-08 VITALS
DIASTOLIC BLOOD PRESSURE: 82 MMHG | HEART RATE: 82 BPM | SYSTOLIC BLOOD PRESSURE: 152 MMHG | WEIGHT: 293 LBS | OXYGEN SATURATION: 93 % | BODY MASS INDEX: 61.32 KG/M2 | TEMPERATURE: 97.9 F | RESPIRATION RATE: 20 BRPM

## 2018-06-08 DIAGNOSIS — S82.891D CLOSED FRACTURE OF BOTH ANKLES WITH ROUTINE HEALING: Primary | ICD-10-CM

## 2018-06-08 DIAGNOSIS — S82.892D CLOSED FRACTURE OF BOTH ANKLES WITH ROUTINE HEALING: Primary | ICD-10-CM

## 2018-06-08 DIAGNOSIS — E66.2 OBESITY HYPOVENTILATION SYNDROME (H): ICD-10-CM

## 2018-06-08 DIAGNOSIS — I10 ESSENTIAL HYPERTENSION, BENIGN: ICD-10-CM

## 2018-06-08 DIAGNOSIS — R53.81 PHYSICAL DECONDITIONING: ICD-10-CM

## 2018-06-08 DIAGNOSIS — R60.0 BILATERAL LEG EDEMA: ICD-10-CM

## 2018-06-08 PROCEDURE — 99309 SBSQ NF CARE MODERATE MDM 30: CPT | Performed by: NURSE PRACTITIONER

## 2018-06-08 NOTE — MR AVS SNAPSHOT
After Visit Summary   6/8/2018    Mirta Decker    MRN: [id number]           Patient Information     Date Of Birth          1965        Visit Information        Provider Department      6/8/2018 1:30 PM Aminata Guajardo APRN CNP [facility]        Today's Diagnoses     Closed fracture of both ankles with routine healing    -  1    Bilateral leg edema        Essential hypertension, benign        Obesity hypoventilation syndrome (H)        Physical deconditioning           Follow-ups after your visit        Who to contact     If you have questions or need follow up information about today's clinic visit or your schedule please contact [facility] directly at [phone].  Normal or non-critical lab and imaging results will be communicated to you by [employer], letter or phone within 4 business days after the clinic has received the results. If you do not hear from us within 7 days, please contact the clinic through [employer] or phone. If you have a critical or abnormal lab result, we will notify you by phone as soon as possible.  Submit refill requests through [employer] or call your pharmacy and they will forward the refill request to us. Please allow 3 business days for your refill to be completed.          Additional Information About Your Visit        MyChart Information     [employer] gives you secure access to your electronic health record. If you see a primary care provider, you can also send messages to your care team and make appointments. If you have questions, please call your primary care clinic.  If you do not have a primary care provider, please call [phone] and they will assist you.        Care EveryWhere ID     This is your Care EveryWhere ID. This could be used by other organizations to access your [facility] medical records  [id number]        Your Vitals Were     Pulse Temperature Respirations Pulse Oximetry BMI (Body Mass Index)       82 97.9  F  (36.6  C) 20 93% 61.32 kg/m2        Blood Pressure from Last 3 Encounters:   06/08/18 152/82   06/04/18 137/69   05/29/18 151/52    Weight from Last 3 Encounters:   06/08/18 314 lb (142.4 kg)   06/04/18 323 lb 3.2 oz (146.6 kg)   05/29/18 321 lb (145.6 kg)              Today, you had the following     No orders found for display         Today's Medication Changes          These changes are accurate as of 6/8/18  2:44 PM.  If you have any questions, ask your nurse or doctor.               Stop taking these medicines if you haven't already. Please contact your care team if you have questions.     enoxaparin 60 MG/0.6ML injection   Commonly known as:  LOVENOX                    Primary Care Provider Office Phone # Fax #    Yani Martin [phone] [address] [address]        Equal Access to Services     [facility] : Hadii errol borjas hadasho Soabdoulaye, waaxda luqadaha, qaybta kaalmada adeegyada, bakari mason . So [facility] [phone].    ATENCIÓN: Si habla español, tiene a gant disposición servicios gratuitos de asistencia lingüística. [facility] [phone].    We comply with applicable federal civil rights laws and [place] laws. We do not discriminate on the basis of race, color, national origin, age, disability, sex, sexual orientation, or gender identity.            Thank you!     Thank you for choosing [facility]  for your care. Our goal is always to provide you with excellent care. Hearing back from our patients is one way we can continue to improve our services. Please take a few minutes to complete the written survey that you may receive in the mail after your visit with us. Thank you!             Your Updated Medication List - Protect others around you: Learn how to safely use, store and throw away your medicines at www.disposemymeds.org.          This list is accurate as of 6/8/18  2:44 PM.  Always use your most  recent med list.                   Brand Name Dispense Instructions for use Diagnosis    ACETAMINOPHEN PO      Take 650 mg by mouth every 6 hours        AQUAPHOR ADVANCED THERAPY Oint      Apply topically 2 times daily Apply to LE/feet all dry skin        ascorbic acid 500 MG Tabs      Take 500 mg by mouth 2 times daily        budesonide 0.5 MG/2ML neb solution    PULMICORT     Take 0.5 mg by nebulization 2 times daily        calcipotriene 0.005 % cream    DOVONOX     Apply topically 2 times daily    Psoriatic arthropathy (H)       cetirizine 10 MG tablet    zyrTEC     Take 10 mg by mouth daily        Chlorhexidine Gluconate Soln      Take 15 mLs by mouth 2 times daily        DIPHENHYDRAMINE HCL PO      Take 50 mg by mouth At Bedtime And daily as needed.        FAMOTIDINE PO      Take 20 mg by mouth 2 times daily        ferrous sulfate 325 (65 Fe) MG tablet    IRON     Take 325 mg by mouth daily        fluticasone 50 MCG/ACT spray    FLONASE     Spray 1 spray into both nostrils daily        guaiFENesin 600 MG 12 hr tablet    MUCINEX     Take 600 mg by mouth 2 times daily        HYDRALAZINE HCL PO      Take 50 mg by mouth every 6 hours as needed for high blood pressure        ipratropium - albuterol 0.5 mg/2.5 mg/3 mL 0.5-2.5 (3) MG/3ML neb solution    DUONEB     Take 1 vial by nebulization 3 times daily And every 4 hours as needed.        LORAZEPAM PO      Take 0.5 mg by mouth every 6 hours as needed for anxiety        LOSARTAN POTASSIUM PO      Take 100 mg by mouth daily        MAGNESIUM OXIDE PO      Take 400 mg by mouth 4 times daily        MELATONIN PO      Take 6 mg by mouth At Bedtime        Multi-vitamin Tabs tablet      Take 1 tablet by mouth daily        nystatin 308464 UNIT/GM Powd    MYCOSTATIN     Apply topically 2 times daily Apply under breasts        ONDANSETRON PO      Take 4 mg by mouth every 6 hours as needed for nausea        OXYCODONE HCL PO      Take 5 mg by mouth every 8 hours as needed         polyethylene glycol Packet    MIRALAX/GLYCOLAX     Take 17 g by mouth daily        sennosides 8.8 MG/5ML syrup    SENOKOT     Take 8.8 mg by mouth daily        SERTRALINE HCL PO      Take 100 mg by mouth daily        simethicone 80 MG chewable tablet    MYLICON     Take 80 mg by mouth every 6 hours as needed for flatulence or cramping        sodium chloride 0.65 % nasal spray    OCEAN     Spray 2 sprays into both nostrils every 4 hours as needed for congestion        TORSEMIDE PO      Take 20 mg by mouth 2 times daily

## 2018-06-08 NOTE — PROGRESS NOTES
"[facility]    Chief Complaint   Patient presents with     SILVER       [facility] Record Number:  [id number]    HPI:    Mirta Decker is a 53 year old  (1965), who is being seen today for an episodic care visit at [facility].  HPI information obtained from: facility chart records, facility staff and patient report.Today's concern is:  1. Closed fracture of both ankles with routine healing    2. Bilateral leg edema    3. Essential hypertension, benign    4. Obesity hypoventilation syndrome (H)    5. Physical deconditioning      Ms. Decker was visited today while in her room. She hopes to be off the oxygen by the time of discharge. She is currently on 2 liters per nasal cannula. Continues with intermittent cough, denies wheezing. Feels LE edema is improving. Eating without N/V/D. She is pleased with the progress that she has made in therapy and is happy she now has \"a yellow tag\" so she can ambulate in the room independently.    BP: 117-165/52-96 (generally 130-140's systolic)  HR:  (most consistently in the 80's)  O2: 90-98%  Wt: 318.8lbs 4/30 - 314lbs 6/6    PT note from 6/7  Ambulation with FWW and SBA, W/C follow. Attempted gait training on room air. SPO2 decrease to 83%. Placed patient back on 1L of O2, SPO2 increased to 95%. Educated patient that she still needs O2 for ambulation at this time. Patient verbalized  understanding. Patient tolerated from the therapy gym back to her room with 3 seated rest periods.    OT note from 6/8  Pt self-propelling wheelchair on RA, taking 7 rest breaks throughout. O2 saturations on RA remaining between  87-90% - propelling 184 feet.    ALLERGIES: Lisinopril and Penicillin g  Past Medical, Surgical, Family and Social History reviewed and updated in EPIC.    Current Outpatient Prescriptions   Medication Sig Dispense Refill     ACETAMINOPHEN PO Take 650 mg by mouth every 6 hours       ascorbic acid 500 MG TABS Take 500 mg by mouth 2 " times daily       budesonide (PULMICORT) 0.5 MG/2ML neb solution Take 0.5 mg by nebulization 2 times daily       calcipotriene (DOVONOX) 0.005 % cream Apply topically 2 times daily       cetirizine (ZYRTEC) 10 MG tablet Take 10 mg by mouth daily       Chlorhexidine Gluconate SOLN Take 15 mLs by mouth 2 times daily       DIPHENHYDRAMINE HCL PO Take 50 mg by mouth At Bedtime And daily as needed.       Emollient (AQUAPHOR ADVANCED THERAPY) OINT Apply topically 2 times daily Apply to LE/feet all dry skin       FAMOTIDINE PO Take 20 mg by mouth 2 times daily       ferrous sulfate (IRON) 325 (65 Fe) MG tablet Take 325 mg by mouth daily       fluticasone (FLONASE) 50 MCG/ACT spray Spray 1 spray into both nostrils daily       guaiFENesin (MUCINEX) 600 MG 12 hr tablet Take 600 mg by mouth 2 times daily       HYDRALAZINE HCL PO Take 50 mg by mouth every 6 hours as needed for high blood pressure       ipratropium - albuterol 0.5 mg/2.5 mg/3 mL (DUONEB) 0.5-2.5 (3) MG/3ML neb solution Take 1 vial by nebulization 3 times daily And every 4 hours as needed.       LORAZEPAM PO Take 0.5 mg by mouth every 6 hours as needed for anxiety       LOSARTAN POTASSIUM PO Take 100 mg by mouth daily       MAGNESIUM OXIDE PO Take 400 mg by mouth 4 times daily       MELATONIN PO Take 6 mg by mouth At Bedtime       multivitamin, therapeutic with minerals (MULTI-VITAMIN) TABS tablet Take 1 tablet by mouth daily       nystatin (MYCOSTATIN) 765749 UNIT/GM POWD Apply topically 2 times daily Apply under breasts       ONDANSETRON PO Take 4 mg by mouth every 6 hours as needed for nausea       OXYCODONE HCL PO Take 5 mg by mouth every 8 hours as needed       polyethylene glycol (MIRALAX/GLYCOLAX) Packet Take 17 g by mouth daily        sennosides (SENOKOT) 8.8 MG/5ML syrup Take 8.8 mg by mouth daily        SERTRALINE HCL PO Take 100 mg by mouth daily       simethicone (MYLICON) 80 MG chewable tablet Take 80 mg by mouth every 6 hours as needed for  flatulence or cramping       sodium chloride (OCEAN) 0.65 % nasal spray Spray 2 sprays into both nostrils every 4 hours as needed for congestion       TORSEMIDE PO Take 20 mg by mouth 2 times daily       Medications reviewed:  Medications reconciled to facility chart and changes were made to reflect current medications as identified as above med list. Below are the changes that were made:   Medications stopped since last EPIC medication reconciliation:   Medications Discontinued During This Encounter   Medication Reason     enoxaparin (LOVENOX) 60 MG/0.6ML injection        Medications started since last [facility] medication reconciliation:  No orders of the defined types were placed in this encounter.    REVIEW OF SYSTEMS:  4 point ROS including Respiratory, CV, GI and , other than that noted in the HPI,  is negative    Physical Exam:  /82  Pulse 82  Temp 97.9  F (36.6  C)  Resp 20  Wt 314 lb (142.4 kg)  SpO2 93%  BMI 61.32 kg/m2  GENERAL APPEARANCE:  Alert, in no distress, morbidly obese, pleasant  ENT:  Mouth and posterior oropharynx normal, moist mucous membranes, normal hearing acuity  EYES:  EOM normal, conjunctiva and lids normal  NECK:  No adenopathy,masses or thyromegaly  RESP:  respiratory effort and palpation of chest normal,  no respiratory distress, diminished on right side, but clear throughout all lobes. On 2 liters per nasal cannula.  CV:  Palpation and auscultation of heart done , regular rate and rhythm, no murmur,+2 pedal pulses,Trace edema to BLE   ABDOMEN:  soft, nontender  M/S:  wheelchair independently. Moves all extremities.   SKIN:  No open areas. Trach site healed.   All incisions healed.  PSYCH:  oriented X 3, normal insight, judgement and memory, affect and mood normal    Recent Labs:  CBC RESULTS:   Recent Labs   Lab Test  06/04/18   1030  05/22/18   0950  05/14/18   0820   05/02/18   0955   WBC   --    --   4.5   --   7.3   RBC   --    --   3.80   --   4.05   HGB  10.5*  9.8*   9.7*   < >  10.4*   HCT   --    --   33.4*   --   35.2   MCV   --    --   88   --   87   MCH   --    --   25.5*   --   25.7*   MCHC   --    --   29.0*   --   29.5*   RDW   --    --   17.2*   --   16.9*   PLT   --    --   213   --   277    < > = values in this interval not displayed.       Last Basic Metabolic Panel:  Recent Labs   Lab Test  06/06/18   0807  06/04/18   1030   NA  139  138   POTASSIUM  4.0  3.7   CHLORIDE  102  101   SHEILA  9.3  9.4   CO2  31  30   BUN  15  17   CR  0.68  0.67   GLC  90  122*       Liver Function Studies -   Recent Labs   Lab Test  07/11/17   1829  05/07/16   1041   PROTTOTAL  7.7  7.5   ALBUMIN  3.7  3.6   BILITOTAL  0.7  0.5   ALKPHOS  66  60   AST  21  16   ALT  29  15     Lab Results   Component Value Date    A1C 6.0 05/07/2016     Assessment/Plan:  (S82.891D,  S82.892D) Closed fracture of both ankles with routine healing  (primary encounter diagnosis)  Comment: pain is controlled. Most painful in the evening hours. She prefers to use holistic approaches for pain control and has had limited use of oxycodone. Recent images show healing fractures. Has completed lovenox.    Plan: continue oxycodone 5 mg q8h PRN and tylenol 650 mg q6h PRN. Continue therapies. Ortho follow up as scheduled. WBAT.     (R60.0) Bilateral leg edema  Comment: improving. Received increase in torsemide this week from 20 mg daily to 40 mg with last dost today and will then go to 20 mg BID.   Plan: continue with elevation as much as tolerated. Torsemide 20 mg BID. [facility] on 6/11.     (I10) Essential hypertension, benign  Comment: blood pressures have been 130-150 systolic. She has an order for hydralazine PRN but has not had to be given.   Plan: continue with losartan 100 mg daily and hydralazine PRN.    (E66.2) Obesity hypoventilation syndrome (H)  Comment: was seen by a pulmonologist at [facility] and will be seen for sleep study after TCU discharge. She reports CO2 levels of 70 at [facility].   Plan: continue nebs,  guaifenesin. Wean O2 as tolerated. Continue BiPAP at night. Sleep study to be scheduled at tcu discharge .    (R53.81) Physical deconditioning  Comment: secondary to underlying medical conditions and fractures.   Plan: ongoing PT/OT for strengthening.     Electronically signed by  ANNA Chavez CNP

## 2018-06-11 ENCOUNTER — HOSPITAL LABORATORY (OUTPATIENT)
Dept: OTHER | Facility: CLINIC | Age: 53
End: 2018-06-11

## 2018-06-11 LAB
ANION GAP SERPL CALCULATED.3IONS-SCNC: 8 MMOL/L (ref 3–14)
BUN SERPL-MCNC: 16 MG/DL (ref 7–30)
CALCIUM SERPL-MCNC: 9.4 MG/DL (ref 8.5–10.1)
CHLORIDE SERPL-SCNC: 103 MMOL/L (ref 94–109)
CO2 SERPL-SCNC: 28 MMOL/L (ref 20–32)
CREAT SERPL-MCNC: 0.79 MG/DL (ref 0.52–1.04)
GFR SERPL CREATININE-BSD FRML MDRD: 75 ML/MIN/1.7M2
GLUCOSE SERPL-MCNC: 98 MG/DL (ref 70–99)
HGB BLD-MCNC: 11 G/DL (ref 11.7–15.7)
POTASSIUM SERPL-SCNC: 4 MMOL/L (ref 3.4–5.3)
SODIUM SERPL-SCNC: 139 MMOL/L (ref 133–144)

## 2018-06-13 ENCOUNTER — NURSING HOME VISIT (OUTPATIENT)
Dept: GERIATRICS | Facility: CLINIC | Age: 53
End: 2018-06-13
Payer: COMMERCIAL

## 2018-06-13 VITALS
OXYGEN SATURATION: 95 % | WEIGHT: 293 LBS | BODY MASS INDEX: 61.13 KG/M2 | HEART RATE: 81 BPM | RESPIRATION RATE: 24 BRPM | TEMPERATURE: 97.8 F | SYSTOLIC BLOOD PRESSURE: 169 MMHG | DIASTOLIC BLOOD PRESSURE: 93 MMHG

## 2018-06-13 DIAGNOSIS — S82.891D CLOSED FRACTURE OF BOTH ANKLES WITH ROUTINE HEALING: ICD-10-CM

## 2018-06-13 DIAGNOSIS — D64.9 ANEMIA, UNSPECIFIED TYPE: ICD-10-CM

## 2018-06-13 DIAGNOSIS — F41.9 ANXIETY: ICD-10-CM

## 2018-06-13 DIAGNOSIS — L40.50 PSORIATIC ARTHROPATHY (H): ICD-10-CM

## 2018-06-13 DIAGNOSIS — K59.01 SLOW TRANSIT CONSTIPATION: ICD-10-CM

## 2018-06-13 DIAGNOSIS — R60.0 BILATERAL LEG EDEMA: ICD-10-CM

## 2018-06-13 DIAGNOSIS — J94.2 HEMOTHORAX, RIGHT: Primary | ICD-10-CM

## 2018-06-13 DIAGNOSIS — S82.892D CLOSED FRACTURE OF BOTH ANKLES WITH ROUTINE HEALING: ICD-10-CM

## 2018-06-13 DIAGNOSIS — R53.81 PHYSICAL DECONDITIONING: ICD-10-CM

## 2018-06-13 DIAGNOSIS — F32.9 REACTIVE DEPRESSION: ICD-10-CM

## 2018-06-13 DIAGNOSIS — I10 ESSENTIAL HYPERTENSION, BENIGN: ICD-10-CM

## 2018-06-13 DIAGNOSIS — S22.071D CLOSED STABLE BURST FRACTURE OF NINTH THORACIC VERTEBRA WITH ROUTINE HEALING: ICD-10-CM

## 2018-06-13 DIAGNOSIS — B37.2 YEAST INFECTION OF THE SKIN: ICD-10-CM

## 2018-06-13 PROCEDURE — 99310 SBSQ NF CARE HIGH MDM 45: CPT | Performed by: NURSE PRACTITIONER
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
## 2018-06-18 ENCOUNTER — HOSPITAL LABORATORY (OUTPATIENT)
Dept: OTHER | Facility: CLINIC | Age: 53
End: 2018-06-18

## 2018-06-18 LAB
ANION GAP SERPL CALCULATED.3IONS-SCNC: 6 MMOL/L (ref 3–14)
BUN SERPL-MCNC: 23 MG/DL (ref 7–30)
CALCIUM SERPL-MCNC: 9.6 MG/DL (ref 8.5–10.1)
CHLORIDE SERPL-SCNC: 100 MMOL/L (ref 94–109)
CO2 SERPL-SCNC: 31 MMOL/L (ref 20–32)
CREAT SERPL-MCNC: 0.74 MG/DL (ref 0.52–1.04)
GFR SERPL CREATININE-BSD FRML MDRD: 81 ML/MIN/1.7M2
GLUCOSE SERPL-MCNC: 92 MG/DL (ref 70–99)
HGB BLD-MCNC: 11.5 G/DL (ref 11.7–15.7)
POTASSIUM SERPL-SCNC: 3.9 MMOL/L (ref 3.4–5.3)
SODIUM SERPL-SCNC: 137 MMOL/L (ref 133–144)

## 2018-06-21 ENCOUNTER — NURSING HOME VISIT (OUTPATIENT)
Dept: GERIATRICS | Facility: CLINIC | Age: 53
End: 2018-06-21
Payer: COMMERCIAL

## 2018-06-21 VITALS
HEART RATE: 88 BPM | RESPIRATION RATE: 20 BRPM | WEIGHT: 293 LBS | SYSTOLIC BLOOD PRESSURE: 146 MMHG | DIASTOLIC BLOOD PRESSURE: 83 MMHG | TEMPERATURE: 98.4 F | OXYGEN SATURATION: 92 % | BODY MASS INDEX: 59.88 KG/M2

## 2018-06-21 DIAGNOSIS — I10 ESSENTIAL HYPERTENSION, BENIGN: ICD-10-CM

## 2018-06-21 DIAGNOSIS — R53.81 PHYSICAL DECONDITIONING: ICD-10-CM

## 2018-06-21 DIAGNOSIS — R60.0 BILATERAL LEG EDEMA: ICD-10-CM

## 2018-06-21 DIAGNOSIS — S82.892D CLOSED FRACTURE OF BOTH ANKLES WITH ROUTINE HEALING: ICD-10-CM

## 2018-06-21 DIAGNOSIS — L40.50 PSORIATIC ARTHROPATHY (H): ICD-10-CM

## 2018-06-21 DIAGNOSIS — S82.891D CLOSED FRACTURE OF BOTH ANKLES WITH ROUTINE HEALING: ICD-10-CM

## 2018-06-21 DIAGNOSIS — J94.2 HEMOTHORAX, RIGHT: Primary | ICD-10-CM

## 2018-06-21 DIAGNOSIS — D64.9 ANEMIA, UNSPECIFIED TYPE: ICD-10-CM

## 2018-06-21 DIAGNOSIS — F32.9 REACTIVE DEPRESSION: ICD-10-CM

## 2018-06-21 DIAGNOSIS — F41.9 ANXIETY: ICD-10-CM

## 2018-06-21 DIAGNOSIS — B37.2 YEAST INFECTION OF THE SKIN: ICD-10-CM

## 2018-06-21 PROCEDURE — 99309 SBSQ NF CARE MODERATE MDM 30: CPT | Performed by: NURSE PRACTITIONER
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
## 2018-06-25 ENCOUNTER — NURSING HOME VISIT (OUTPATIENT)
Dept: GERIATRICS | Facility: CLINIC | Age: 53
End: 2018-06-25
Payer: COMMERCIAL

## 2018-06-25 ENCOUNTER — HOSPITAL LABORATORY (OUTPATIENT)
Dept: OTHER | Facility: CLINIC | Age: 53
End: 2018-06-25

## 2018-06-25 VITALS
BODY MASS INDEX: 61.21 KG/M2 | RESPIRATION RATE: 18 BRPM | DIASTOLIC BLOOD PRESSURE: 86 MMHG | OXYGEN SATURATION: 93 % | WEIGHT: 293 LBS | HEART RATE: 87 BPM | SYSTOLIC BLOOD PRESSURE: 143 MMHG | TEMPERATURE: 98.4 F

## 2018-06-25 DIAGNOSIS — I10 ESSENTIAL HYPERTENSION, BENIGN: ICD-10-CM

## 2018-06-25 DIAGNOSIS — S82.892D CLOSED FRACTURE OF BOTH ANKLES WITH ROUTINE HEALING: ICD-10-CM

## 2018-06-25 DIAGNOSIS — R53.81 PHYSICAL DECONDITIONING: ICD-10-CM

## 2018-06-25 DIAGNOSIS — D64.9 ANEMIA, UNSPECIFIED TYPE: ICD-10-CM

## 2018-06-25 DIAGNOSIS — S82.891D CLOSED FRACTURE OF BOTH ANKLES WITH ROUTINE HEALING: ICD-10-CM

## 2018-06-25 DIAGNOSIS — J94.2 HEMOTHORAX, RIGHT: ICD-10-CM

## 2018-06-25 DIAGNOSIS — R05.9 COUGH: Primary | ICD-10-CM

## 2018-06-25 DIAGNOSIS — R60.0 BILATERAL LEG EDEMA: ICD-10-CM

## 2018-06-25 LAB
ANION GAP SERPL CALCULATED.3IONS-SCNC: 10 MMOL/L (ref 3–14)
BASOPHILS # BLD AUTO: 0 10E9/L (ref 0–0.2)
BASOPHILS NFR BLD AUTO: 0.3 %
BUN SERPL-MCNC: 15 MG/DL (ref 7–30)
CALCIUM SERPL-MCNC: 8.9 MG/DL (ref 8.5–10.1)
CHLORIDE SERPL-SCNC: 101 MMOL/L (ref 94–109)
CO2 SERPL-SCNC: 27 MMOL/L (ref 20–32)
CREAT SERPL-MCNC: 0.76 MG/DL (ref 0.52–1.04)
DIFFERENTIAL METHOD BLD: ABNORMAL
EOSINOPHIL # BLD AUTO: 0.3 10E9/L (ref 0–0.7)
EOSINOPHIL NFR BLD AUTO: 4.6 %
ERYTHROCYTE [DISTWIDTH] IN BLOOD BY AUTOMATED COUNT: 17.1 % (ref 10–15)
GFR SERPL CREATININE-BSD FRML MDRD: 79 ML/MIN/1.7M2
GLUCOSE SERPL-MCNC: 143 MG/DL (ref 70–99)
HCT VFR BLD AUTO: 39.2 % (ref 35–47)
HGB BLD-MCNC: 12.1 G/DL (ref 11.7–15.7)
IMM GRANULOCYTES # BLD: 0 10E9/L (ref 0–0.4)
IMM GRANULOCYTES NFR BLD: 0.2 %
LYMPHOCYTES # BLD AUTO: 1.5 10E9/L (ref 0.8–5.3)
LYMPHOCYTES NFR BLD AUTO: 23.2 %
MCH RBC QN AUTO: 25.9 PG (ref 26.5–33)
MCHC RBC AUTO-ENTMCNC: 30.9 G/DL (ref 31.5–36.5)
MCV RBC AUTO: 84 FL (ref 78–100)
MONOCYTES # BLD AUTO: 0.1 10E9/L (ref 0–1.3)
MONOCYTES NFR BLD AUTO: 2.1 %
NEUTROPHILS # BLD AUTO: 4.4 10E9/L (ref 1.6–8.3)
NEUTROPHILS NFR BLD AUTO: 69.6 %
NRBC # BLD AUTO: 0 10*3/UL
NRBC BLD AUTO-RTO: 0 /100
PLATELET # BLD AUTO: 243 10E9/L (ref 150–450)
POTASSIUM SERPL-SCNC: 3.8 MMOL/L (ref 3.4–5.3)
RBC # BLD AUTO: 4.68 10E12/L (ref 3.8–5.2)
SODIUM SERPL-SCNC: 138 MMOL/L (ref 133–144)
WBC # BLD AUTO: 6.3 10E9/L (ref 4–11)

## 2018-06-25 PROCEDURE — 99309 SBSQ NF CARE MODERATE MDM 30: CPT | Performed by: NURSE PRACTITIONER
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
## 2018-06-28 ENCOUNTER — HOSPITAL LABORATORY (OUTPATIENT)
Dept: OTHER | Facility: CLINIC | Age: 53
End: 2018-06-28

## 2018-06-28 LAB
ANION GAP SERPL CALCULATED.3IONS-SCNC: 7 MMOL/L (ref 3–14)
BUN SERPL-MCNC: 22 MG/DL (ref 7–30)
CALCIUM SERPL-MCNC: 9.4 MG/DL (ref 8.5–10.1)
CHLORIDE SERPL-SCNC: 102 MMOL/L (ref 94–109)
CO2 SERPL-SCNC: 30 MMOL/L (ref 20–32)
CREAT SERPL-MCNC: 0.77 MG/DL (ref 0.52–1.04)
GFR SERPL CREATININE-BSD FRML MDRD: 77 ML/MIN/1.7M2
GLUCOSE SERPL-MCNC: 135 MG/DL (ref 70–99)
POTASSIUM SERPL-SCNC: 3.8 MMOL/L (ref 3.4–5.3)
SODIUM SERPL-SCNC: 139 MMOL/L (ref 133–144)

## 2018-06-29 ENCOUNTER — DISCHARGE SUMMARY NURSING HOME (OUTPATIENT)
Dept: GERIATRICS | Facility: CLINIC | Age: 53
End: 2018-06-29
Payer: COMMERCIAL

## 2018-06-29 VITALS
HEART RATE: 85 BPM | SYSTOLIC BLOOD PRESSURE: 129 MMHG | OXYGEN SATURATION: 94 % | TEMPERATURE: 98 F | BODY MASS INDEX: 60.66 KG/M2 | WEIGHT: 293 LBS | RESPIRATION RATE: 18 BRPM | DIASTOLIC BLOOD PRESSURE: 76 MMHG

## 2018-06-29 DIAGNOSIS — F32.9 REACTIVE DEPRESSION: ICD-10-CM

## 2018-06-29 DIAGNOSIS — D64.9 ANEMIA, UNSPECIFIED TYPE: ICD-10-CM

## 2018-06-29 DIAGNOSIS — R53.81 PHYSICAL DECONDITIONING: ICD-10-CM

## 2018-06-29 DIAGNOSIS — B37.2 YEAST INFECTION OF THE SKIN: ICD-10-CM

## 2018-06-29 DIAGNOSIS — R60.0 BILATERAL LEG EDEMA: ICD-10-CM

## 2018-06-29 DIAGNOSIS — S22.071D CLOSED STABLE BURST FRACTURE OF NINTH THORACIC VERTEBRA WITH ROUTINE HEALING: ICD-10-CM

## 2018-06-29 DIAGNOSIS — J18.9 PNEUMONIA OF RIGHT MIDDLE LOBE DUE TO INFECTIOUS ORGANISM: Primary | ICD-10-CM

## 2018-06-29 DIAGNOSIS — J94.2 HEMOTHORAX, RIGHT: ICD-10-CM

## 2018-06-29 DIAGNOSIS — F41.9 ANXIETY: ICD-10-CM

## 2018-06-29 DIAGNOSIS — I10 ESSENTIAL HYPERTENSION, BENIGN: ICD-10-CM

## 2018-06-29 DIAGNOSIS — S82.892D CLOSED FRACTURE OF BOTH ANKLES WITH ROUTINE HEALING: ICD-10-CM

## 2018-06-29 DIAGNOSIS — K59.01 SLOW TRANSIT CONSTIPATION: ICD-10-CM

## 2018-06-29 DIAGNOSIS — S22.068D OTHER CLOSED FRACTURE OF EIGHTH THORACIC VERTEBRA WITH ROUTINE HEALING, SUBSEQUENT ENCOUNTER: ICD-10-CM
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
## 2018-06-29 DIAGNOSIS — L40.50 PSORIATIC ARTHROPATHY (H): ICD-10-CM

## 2018-06-29 DIAGNOSIS — S82.891D CLOSED FRACTURE OF BOTH ANKLES WITH ROUTINE HEALING: ICD-10-CM

## 2018-06-29 DIAGNOSIS — E66.01 MORBID OBESITY WITH BMI OF 60.0-69.9, ADULT (H): ICD-10-CM

## 2018-06-29 PROCEDURE — 99316 NF DSCHRG MGMT 30 MIN+: CPT | Performed by: NURSE PRACTITIONER
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
## 2018-07-02 ENCOUNTER — HOSPITAL LABORATORY (OUTPATIENT)
Dept: OTHER | Facility: CLINIC | Age: 53
End: 2018-07-02

## 2018-07-02 ENCOUNTER — NURSING HOME VISIT (OUTPATIENT)
Dept: GERIATRICS | Facility: CLINIC | Age: 53
End: 2018-07-02
Payer: COMMERCIAL

## 2018-07-02 VITALS
TEMPERATURE: 97.3 F | DIASTOLIC BLOOD PRESSURE: 82 MMHG | HEART RATE: 84 BPM | SYSTOLIC BLOOD PRESSURE: 141 MMHG | BODY MASS INDEX: 60.93 KG/M2 | WEIGHT: 293 LBS

## 2018-07-02 DIAGNOSIS — E66.01 MORBID OBESITY WITH BMI OF 60.0-69.9, ADULT (H): ICD-10-CM

## 2018-07-02 DIAGNOSIS — J94.2 HEMOTHORAX, RIGHT: ICD-10-CM

## 2018-07-02 DIAGNOSIS — R60.0 BILATERAL LEG EDEMA: ICD-10-CM

## 2018-07-02 DIAGNOSIS — J18.9 PNEUMONIA OF RIGHT MIDDLE LOBE DUE TO INFECTIOUS ORGANISM: ICD-10-CM

## 2018-07-02 DIAGNOSIS — R53.81 PHYSICAL DECONDITIONING: ICD-10-CM

## 2018-07-02 DIAGNOSIS — R09.02 HYPOXIA: Primary | ICD-10-CM
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
## 2018-07-02 LAB
ANION GAP SERPL CALCULATED.3IONS-SCNC: 11 MMOL/L (ref 3–14)
BUN SERPL-MCNC: 20 MG/DL (ref 7–30)
CALCIUM SERPL-MCNC: 9.3 MG/DL (ref 8.5–10.1)
CHLORIDE SERPL-SCNC: 101 MMOL/L (ref 94–109)
CO2 SERPL-SCNC: 27 MMOL/L (ref 20–32)
CREAT SERPL-MCNC: 0.75 MG/DL (ref 0.52–1.04)
GFR SERPL CREATININE-BSD FRML MDRD: 80 ML/MIN/1.7M2
GLUCOSE SERPL-MCNC: 153 MG/DL (ref 70–99)
HGB BLD-MCNC: 11.4 G/DL (ref 11.7–15.7)
MAGNESIUM SERPL-MCNC: 2.1 MG/DL (ref 1.6–2.3)
POTASSIUM SERPL-SCNC: 3.8 MMOL/L (ref 3.4–5.3)
SODIUM SERPL-SCNC: 139 MMOL/L (ref 133–144)

## 2018-07-02 PROCEDURE — 99309 SBSQ NF CARE MODERATE MDM 30: CPT | Performed by: NURSE PRACTITIONER
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
## 2018-07-05 ENCOUNTER — CARE COORDINATION (OUTPATIENT)
Dept: FAMILY MEDICINE | Facility: CLINIC | Age: 53
End: 2018-07-05

## 2018-07-05 NOTE — PROGRESS NOTES
Care Coordination Assessment    PCP: Yani Martin    Referral Source:  ED/IP List      Clinical Data: Patient discharged from transitional care with pneumonia fo the right middle lobe.  Patient discharged to home with instructions to follow up with PCP in 7 days     Plan: Patient already has a follow up scheduled.  I will close encounter

## 2018-07-06 ENCOUNTER — DOCUMENTATION ONLY (OUTPATIENT)
Dept: CARE COORDINATION | Facility: CLINIC | Age: 53
End: 2018-07-06

## 2018-07-06 ENCOUNTER — OFFICE VISIT (OUTPATIENT)
Dept: FAMILY MEDICINE | Facility: CLINIC | Age: 53
End: 2018-07-06

## 2018-07-06 VITALS
BODY MASS INDEX: 61.36 KG/M2 | TEMPERATURE: 98.4 F | OXYGEN SATURATION: 96 % | WEIGHT: 293 LBS | RESPIRATION RATE: 16 BRPM | SYSTOLIC BLOOD PRESSURE: 132 MMHG | HEART RATE: 85 BPM | DIASTOLIC BLOOD PRESSURE: 70 MMHG

## 2018-07-06 DIAGNOSIS — S82.892D CLOSED FRACTURE OF BOTH ANKLES WITH ROUTINE HEALING: ICD-10-CM

## 2018-07-06 DIAGNOSIS — B37.2 YEAST INFECTION OF THE SKIN: ICD-10-CM

## 2018-07-06 DIAGNOSIS — R53.81 PHYSICAL DECONDITIONING: ICD-10-CM

## 2018-07-06 DIAGNOSIS — R09.02 HYPOXIA: ICD-10-CM

## 2018-07-06 DIAGNOSIS — S82.891D CLOSED FRACTURE OF BOTH ANKLES WITH ROUTINE HEALING: ICD-10-CM

## 2018-07-06 DIAGNOSIS — R60.0 BILATERAL LEG EDEMA: ICD-10-CM

## 2018-07-06 DIAGNOSIS — S22.071D CLOSED STABLE BURST FRACTURE OF NINTH THORACIC VERTEBRA WITH ROUTINE HEALING: ICD-10-CM

## 2018-07-06 DIAGNOSIS — K13.79 MOUTH SORES: ICD-10-CM
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
## 2018-07-06 PROBLEM — J18.9 PNEUMONIA DUE TO INFECTIOUS ORGANISM: Status: RESOLVED | Noted: 2018-05-14 | Resolved: 2018-07-06

## 2018-07-06 PROCEDURE — 99495 TRANSJ CARE MGMT MOD F2F 14D: CPT | Performed by: FAMILY MEDICINE

## 2018-07-06 RX ORDER — SENNOSIDES 8.6 MG/1
TABLET ORAL
Refills: 0 | COMMUNITY
Start: 2018-07-03 | End: 2018-07-27

## 2018-07-06 RX ORDER — ACETAMINOPHEN 650 MG/1
TABLET, FILM COATED, EXTENDED RELEASE ORAL
Refills: 0 | Status: ON HOLD | COMMUNITY
Start: 2018-07-03 | End: 2020-03-08

## 2018-07-06 RX ORDER — NYSTATIN 100000 [USP'U]/G
POWDER TOPICAL 2 TIMES DAILY
Qty: 180 G | Refills: 3 | Status: SHIPPED | OUTPATIENT
Start: 2018-07-06 | End: 2018-10-30

## 2018-07-06 RX ORDER — CHLORHEXIDINE GLUCONATE 20 %
15 SOLUTION, NON-ORAL MISCELLANEOUS 2 TIMES DAILY
Qty: 500 ML | Refills: 1 | Status: SHIPPED | OUTPATIENT
Start: 2018-07-06 | End: 2018-07-27

## 2018-07-06 NOTE — PROGRESS NOTES
Lamar from [facility] is calling for verbal orders on this patient for SN 2 x per week for 3 weeks and then 1 week and prn, PT/OT to eval and HHA 2 x per week then 1 x per week and prn.      Please Lm on [facility] [phone]
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
## 2018-07-07 ENCOUNTER — TRANSFERRED RECORDS (OUTPATIENT)
Dept: FAMILY MEDICINE | Facility: CLINIC | Age: 53
End: 2018-07-07

## 2018-07-07 NOTE — PATIENT INSTRUCTIONS
Continue current medications    Home PT/OT and Rn services ordered.    Recheck 2-3 weeks lab work, weight check, medication monitoring    Orthopedic surgery recheck, pulmonary recheck, Rheumatology recheck and dermatology recheck to be scheduled and down this month.

## 2018-07-09 ENCOUNTER — TELEPHONE (OUTPATIENT)
Dept: FAMILY MEDICINE | Facility: CLINIC | Age: 53
End: 2018-07-09

## 2018-07-09 NOTE — TELEPHONE ENCOUNTER
Kristal who is a nurse  for [employer] [facility] the [facility] called to just give Dr. Martin an FYI that she will be following the patient through phone calls every week to be there as support for the patient. She will call and check in with us as well to let us know how the conversations have been going. She does not need a call back or anything but stated she can be reached at [phone] in case there is anything we need from her.

## 2018-07-09 NOTE — PROGRESS NOTES
I received a fax for documentation purposes only. It is to advise Dr. Martin that all the ordered Disciplines will be involved in the development of the patient's plan of care. They were unable to get the Physical Therapy Evaluation completed in the 5 day evaluation window and there will be a delay.    They will send us the evaluation in the patient's Plan of Care once completed.    This is just an FYI.    Ruth Martinez [facility]  Phone: [phone]  Fax: [phone] [address] [place], [place] [address]. [place] [address]

## 2018-07-11 ENCOUNTER — TELEPHONE (OUTPATIENT)
Dept: FAMILY MEDICINE | Facility: CLINIC | Age: 53
End: 2018-07-11

## 2018-07-11 NOTE — TELEPHONE ENCOUNTER
2 different faxes were received from [facility], 1 for the Annual Oxygen Order and the other for the Nebulizer's and Supplies.    They are in Dr. Martin's bin for review and completion.    Please complete and give back to me to send out.      Thank You,  Ruth Martinez, CMA

## 2018-07-11 NOTE — TELEPHONE ENCOUNTER
Now an Occupational Therapist, Lali Mooney, with [facility] called requesting OT Orders.    OT Visits for: 1x a wk for 1 week and 2x a wk for 3 weeks.    Dr. Garcia can I ok these orders as well as the PT?    Please Review and Advise    Ruth Martinez CMA

## 2018-07-11 NOTE — TELEPHONE ENCOUNTER
Cecilia, PT at [facility] needs orders for  PT 1 x per week for 1 week and 2 x's per week for 3 weeks to work on strength, balance and gait.    [facility] can you address for LES    Secure  [phone]

## 2018-07-11 NOTE — NURSING NOTE
Written orders representing the verbal orders already given have been received and need to be signed.    Orders in bin to be signed by Dr. Martin.    Ruth Martinez CMA

## 2018-07-13 NOTE — TELEPHONE ENCOUNTER
All oxygen services were ordered by her pulmonary specialists in the hospital and followed outpatient by Dr Lois Villalobos at [facility]. Please call the supply company and redirect the orders appropriately.

## 2018-07-17 NOTE — TELEPHONE ENCOUNTER
Written orders representing the verbal orders already given have been received and need to be signed.    Orders in pod 1 to be signed by Dr. Martin.    Ruth Martinez, CMA

## 2018-07-18 ENCOUNTER — RECORDS - HEALTHEAST (OUTPATIENT)
Dept: ADMINISTRATIVE | Facility: OTHER | Age: 53
End: 2018-07-18

## 2018-07-18 ENCOUNTER — RECORDS - HEALTHEAST (OUTPATIENT)
Dept: SLEEP MEDICINE | Facility: CLINIC | Age: 53
End: 2018-07-18

## 2018-07-18 DIAGNOSIS — J96.02 ACUTE RESPIRATORY FAILURE WITH HYPERCAPNIA (H): ICD-10-CM

## 2018-07-18 DIAGNOSIS — E66.01 MORBID (SEVERE) OBESITY DUE TO EXCESS CALORIES (H): ICD-10-CM

## 2018-07-18 DIAGNOSIS — J96.01 ACUTE RESPIRATORY FAILURE WITH HYPOXIA (H): ICD-10-CM

## 2018-07-18 NOTE — TELEPHONE ENCOUNTER
The forms have been completed/signed and faxed back to the number provided.  Order sent to be scanned to chart.    Ruth Martinez CMA

## 2018-07-18 NOTE — TELEPHONE ENCOUNTER
I called and informed [facility] that these forms need to be sent to Dr. Lois Villalobos at [facility]. They state they will let the person who is handling this patient and her orders know and they will send them to the correct clinic.    Ruth Martinez, CMA

## 2018-07-23 ENCOUNTER — COMMUNICATION - HEALTHEAST (OUTPATIENT)
Dept: SLEEP MEDICINE | Facility: CLINIC | Age: 53
End: 2018-07-23

## 2018-07-23 ENCOUNTER — TELEPHONE (OUTPATIENT)
Dept: FAMILY MEDICINE | Facility: CLINIC | Age: 53
End: 2018-07-23

## 2018-07-23 NOTE — TELEPHONE ENCOUNTER
Genesis from  home care called about some weight fluctuations with the patient that have been going on. Since Thursday and Friday of last week it has gone up 3 pounds one day and down 3 pounds the next and then up again. Yesterday it was 314 and now it is up to 318 today. Lung sounds are clear, blood pressure is a little elevated today at 152/98. Elyssa did say she feels that she has a cold going on and feels anxious about that but all other vital signs are normal. She does have an appointment later this week with Dr. Martin so Genesis just wanted Dr. Martin to know what is going on ahead of time in case she wanted to do anything different or talk more about it with the patient at her appointment.

## 2018-07-23 NOTE — TELEPHONE ENCOUNTER
Ask her to bring her homescale in for us to check its accuracy. I think over 300 pounds makes it difficult to measure

## 2018-07-25 ENCOUNTER — RECORDS - HEALTHEAST (OUTPATIENT)
Dept: ADMINISTRATIVE | Facility: OTHER | Age: 53
End: 2018-07-25

## 2018-07-27 ENCOUNTER — COMMUNICATION - HEALTHEAST (OUTPATIENT)
Dept: SLEEP MEDICINE | Facility: CLINIC | Age: 53
End: 2018-07-27

## 2018-07-27 ENCOUNTER — OFFICE VISIT (OUTPATIENT)
Dept: FAMILY MEDICINE | Facility: CLINIC | Age: 53
End: 2018-07-27

## 2018-07-27 VITALS
WEIGHT: 293 LBS | BODY MASS INDEX: 62.11 KG/M2 | DIASTOLIC BLOOD PRESSURE: 76 MMHG | HEART RATE: 82 BPM | SYSTOLIC BLOOD PRESSURE: 132 MMHG | TEMPERATURE: 97.7 F | RESPIRATION RATE: 16 BRPM | OXYGEN SATURATION: 97 %

## 2018-07-27 DIAGNOSIS — D50.0 IRON DEFICIENCY ANEMIA DUE TO CHRONIC BLOOD LOSS: ICD-10-CM

## 2018-07-27 DIAGNOSIS — K59.03 DRUG-INDUCED CONSTIPATION: ICD-10-CM
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
## 2018-07-27 DIAGNOSIS — I10 ESSENTIAL HYPERTENSION, BENIGN: ICD-10-CM

## 2018-07-27 DIAGNOSIS — Z76.0 ENCOUNTER FOR MEDICATION REFILL: ICD-10-CM

## 2018-07-27 DIAGNOSIS — F33.40 RECURRENT MAJOR DEPRESSIVE DISORDER, IN REMISSION (H): ICD-10-CM

## 2018-07-27 DIAGNOSIS — L40.50 PSORIATIC ARTHROPATHY (H): ICD-10-CM

## 2018-07-27 DIAGNOSIS — J30.1 CHRONIC SEASONAL ALLERGIC RHINITIS DUE TO POLLEN: ICD-10-CM

## 2018-07-27 DIAGNOSIS — J96.01 ACUTE RESPIRATORY FAILURE WITH HYPOXIA (H): ICD-10-CM

## 2018-07-27 DIAGNOSIS — K21.9 GASTROESOPHAGEAL REFLUX DISEASE WITHOUT ESOPHAGITIS: ICD-10-CM

## 2018-07-27 DIAGNOSIS — K13.79 MOUTH SORES: ICD-10-CM

## 2018-07-27 LAB
% GRANULOCYTES: 69.6 %
HCT VFR BLD AUTO: 39.9 % (ref 35–47)
HEMOGLOBIN: 12.6 G/DL (ref 11.7–15.7)
LYMPHOCYTES NFR BLD AUTO: 24.5 %
MCH RBC QN AUTO: 25.7 PG (ref 26–33)
MCHC RBC AUTO-ENTMCNC: 31.6 G/DL (ref 31–36)
MCV RBC AUTO: 81.5 FL (ref 78–100)
MONOCYTES NFR BLD AUTO: 5.9 %
PLATELET COUNT - QUEST: 356 10^9/L (ref 150–375)
RBC # BLD AUTO: 4.9 10*12/L (ref 3.8–5.2)
WBC # BLD AUTO: 7.6 10*9/L (ref 4–11)

## 2018-07-27 PROCEDURE — 36415 COLL VENOUS BLD VENIPUNCTURE: CPT | Performed by: FAMILY MEDICINE

## 2018-07-27 PROCEDURE — 99214 OFFICE O/P EST MOD 30 MIN: CPT | Performed by: FAMILY MEDICINE

## 2018-07-27 PROCEDURE — 80053 COMPREHEN METABOLIC PANEL: CPT | Mod: 90 | Performed by: FAMILY MEDICINE

## 2018-07-27 PROCEDURE — 85025 COMPLETE CBC W/AUTO DIFF WBC: CPT | Performed by: FAMILY MEDICINE

## 2018-07-27 RX ORDER — POLYETHYLENE GLYCOL 3350 17 G/17G
1 POWDER, FOR SOLUTION ORAL DAILY
Qty: 100 PACKET | Refills: 0 | Status: SHIPPED | OUTPATIENT
Start: 2018-07-27 | End: 2018-10-30

## 2018-07-27 RX ORDER — CALCIUM CARBONATE/VITAMIN D3 500-10/5ML
400 LIQUID (ML) ORAL 4 TIMES DAILY
Qty: 360 CAPSULE | Refills: 0 | Status: SHIPPED | OUTPATIENT
Start: 2018-07-27 | End: 2018-10-23

## 2018-07-27 RX ORDER — CALCIPOTRIENE 50 UG/G
OINTMENT TOPICAL
Qty: 360 G | Refills: 1 | Status: SHIPPED | OUTPATIENT
Start: 2018-07-27 | End: 2018-10-30

## 2018-07-27 RX ORDER — ASCORBIC ACID 500 MG
500 TABLET ORAL DAILY
Qty: 90 TABLET | Refills: 0 | Status: SHIPPED | OUTPATIENT
Start: 2018-07-27 | End: 2018-07-31

## 2018-07-27 RX ORDER — SENNOSIDES 8.6 MG/1
1 TABLET ORAL DAILY PRN
Qty: 90 TABLET | Refills: 0 | Status: SHIPPED | OUTPATIENT
Start: 2018-07-27 | End: 2018-10-23

## 2018-07-27 RX ORDER — BUDESONIDE 0.5 MG/2ML
0.5 INHALANT ORAL 2 TIMES DAILY
Qty: 1 BOX | Refills: 1 | Status: SHIPPED | OUTPATIENT
Start: 2018-07-27 | End: 2018-08-06

## 2018-07-27 RX ORDER — CHLORHEXIDINE GLUCONATE 20 %
15 SOLUTION, NON-ORAL MISCELLANEOUS 2 TIMES DAILY
Qty: 500 ML | Refills: 1 | Status: SHIPPED | OUTPATIENT
Start: 2018-07-27 | End: 2018-10-30

## 2018-07-27 RX ORDER — DIPHENHYDRAMINE HCL 25 MG
50 CAPSULE ORAL AT BEDTIME
Qty: 180 CAPSULE | Refills: 0 | Status: SHIPPED | OUTPATIENT
Start: 2018-07-27 | End: 2018-10-30

## 2018-07-27 RX ORDER — LOSARTAN POTASSIUM 100 MG/1
100 TABLET ORAL DAILY
Qty: 90 TABLET | Refills: 1 | Status: SHIPPED | OUTPATIENT
Start: 2018-07-27 | End: 2018-10-30

## 2018-07-27 RX ORDER — CETIRIZINE HYDROCHLORIDE 10 MG/1
10 TABLET ORAL DAILY
Qty: 90 TABLET | Refills: 0 | Status: SHIPPED | OUTPATIENT
Start: 2018-07-27 | End: 2018-10-23

## 2018-07-27 RX ORDER — SERTRALINE HYDROCHLORIDE 100 MG/1
100 TABLET, FILM COATED ORAL DAILY
Qty: 90 TABLET | Refills: 1 | Status: SHIPPED | OUTPATIENT
Start: 2018-07-27 | End: 2018-12-29

## 2018-07-27 RX ORDER — IPRATROPIUM BROMIDE AND ALBUTEROL SULFATE 2.5; .5 MG/3ML; MG/3ML
1 SOLUTION RESPIRATORY (INHALATION) 3 TIMES DAILY
Qty: 360 ML | Refills: 0 | Status: ON HOLD | OUTPATIENT
Start: 2018-07-27 | End: 2020-03-08

## 2018-07-27 RX ORDER — FAMOTIDINE 20 MG/1
20 TABLET, FILM COATED ORAL 2 TIMES DAILY
Qty: 180 TABLET | Refills: 0 | Status: SHIPPED | OUTPATIENT
Start: 2018-07-27 | End: 2018-10-23

## 2018-07-27 RX ORDER — MULTIPLE VITAMINS W/ MINERALS TAB 9MG-400MCG
1 TAB ORAL DAILY
Qty: 90 EACH | Refills: 1 | Status: SHIPPED | OUTPATIENT
Start: 2018-07-27 | End: 2019-01-23

## 2018-07-27 RX ORDER — FERROUS SULFATE 325(65) MG
325 TABLET ORAL DAILY
Qty: 100 TABLET | Refills: 0 | Status: SHIPPED | OUTPATIENT
Start: 2018-07-27 | End: 2018-10-30

## 2018-07-27 RX ORDER — TORSEMIDE 20 MG/1
40 TABLET ORAL 2 TIMES DAILY
Qty: 360 TABLET | Refills: 0 | Status: SHIPPED | OUTPATIENT
Start: 2018-07-27 | End: 2018-10-30

## 2018-07-27 RX ORDER — FLUTICASONE PROPIONATE 50 MCG
1 SPRAY, SUSPENSION (ML) NASAL DAILY
Qty: 3 BOTTLE | Refills: 1 | Status: SHIPPED | OUTPATIENT
Start: 2018-07-27 | End: 2018-10-30
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
## 2018-07-27 NOTE — NURSING NOTE
Mirta is here for follow up weight discrepancies/fluid build up, med refills letters for herself and her daughter, labs and other issues    Pre-visit Screening:  Immunizations:  up to date  Colonoscopy:  is due and to be scheduled by patient for later completion  Mammogram: is due and to be scheduled by patient for later completion  Asthma Action Test/Plan:  NA  PHQ9:  NA  GAD7:  NA  Questioned patient about current smoking habits Pt. has never smoked.  Ok to leave detailed message on voice mail for today's visit only Yes, phone # [phone]
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
## 2018-07-28 LAB
ALBUMIN SERPL-MCNC: 3.9 G/DL (ref 3.6–5.1)
ALBUMIN/GLOB SERPL: 0.9 (CALC) (ref 1–2.5)
ALP SERPL-CCNC: 98 U/L (ref 33–130)
ALT SERPL-CCNC: 13 U/L (ref 6–29)
AST SERPL-CCNC: 17 U/L (ref 10–35)
BILIRUB SERPL-MCNC: 0.5 MG/DL (ref 0.2–1.2)
BUN SERPL-MCNC: 19 MG/DL (ref 7–25)
BUN/CREATININE RATIO: ABNORMAL (CALC) (ref 6–22)
CALCIUM SERPL-MCNC: 9.7 MG/DL (ref 8.6–10.4)
CHLORIDE SERPLBLD-SCNC: 99 MMOL/L (ref 98–110)
CO2 SERPL-SCNC: 30 MMOL/L (ref 20–31)
CREAT SERPL-MCNC: 0.83 MG/DL (ref 0.5–1.05)
EGFR AFRICAN AMERICAN - QUEST: 92 ML/MIN/1.73M2
GFR SERPL CREATININE-BSD FRML MDRD: 79 ML/MIN/1.73M2
GLOBULIN, CALCULATED - QUEST: 4.5 G/DL (CALC) (ref 1.9–3.7)
GLUCOSE - QUEST: 86 MG/DL (ref 65–99)
POTASSIUM SERPL-SCNC: 4 MMOL/L (ref 3.5–5.3)
PROT SERPL-MCNC: 8.4 G/DL (ref 6.1–8.1)
SODIUM SERPL-SCNC: 140 MMOL/L (ref 135–146)

## 2018-07-28 ASSESSMENT — PATIENT HEALTH QUESTIONNAIRE - PHQ9: SUM OF ALL RESPONSES TO PHQ QUESTIONS 1-9: 6

## 2018-07-31 DIAGNOSIS — D50.0 IRON DEFICIENCY ANEMIA DUE TO CHRONIC BLOOD LOSS: ICD-10-CM

## 2018-07-31 RX ORDER — ASCORBIC ACID 500 MG
500 TABLET ORAL DAILY
Qty: 180 TABLET | Refills: 0 | Status: SHIPPED | OUTPATIENT
Start: 2018-07-31 | End: 2018-10-30

## 2018-07-31 NOTE — TELEPHONE ENCOUNTER
Mirta Decker is requesting a refill of:    Pending Prescriptions:                       Disp   Refills    ascorbic acid 500 MG TABS                 180 ta*0            Sig: Take 1 tablet (500 mg) by mouth daily With iron    Pt is taking BID.  Please advise  Thanks,Barbara

## 2018-08-02 ENCOUNTER — AMBULATORY - HEALTHEAST (OUTPATIENT)
Dept: SLEEP MEDICINE | Facility: CLINIC | Age: 53
End: 2018-08-02

## 2018-08-02 ENCOUNTER — TRANSFERRED RECORDS (OUTPATIENT)
Dept: FAMILY MEDICINE | Facility: CLINIC | Age: 53
End: 2018-08-02

## 2018-08-02 ENCOUNTER — DOCUMENTATION ONLY (OUTPATIENT)
Dept: CARE COORDINATION | Facility: CLINIC | Age: 53
End: 2018-08-02

## 2018-08-02 ENCOUNTER — TELEPHONE (OUTPATIENT)
Dept: FAMILY MEDICINE | Facility: CLINIC | Age: 53
End: 2018-08-02

## 2018-08-02 ENCOUNTER — OFFICE VISIT - HEALTHEAST (OUTPATIENT)
Dept: SLEEP MEDICINE | Facility: CLINIC | Age: 53
End: 2018-08-02

## 2018-08-02 DIAGNOSIS — G47.33 OSA (OBSTRUCTIVE SLEEP APNEA): ICD-10-CM

## 2018-08-02 ASSESSMENT — MIFFLIN-ST. JEOR: SCORE: 1934.87

## 2018-08-02 NOTE — TELEPHONE ENCOUNTER
Cecilia a PT called to request orders for PT  2x a week for 2 weeks for endurance, strength, and gait.    Verbal orders can be left at [phone]    Please advise

## 2018-08-02 NOTE — PROGRESS NOTES
[facility] utilizes an encounter to take the place of a direct phone call to your office. Please take a moment to review the below request. Please reply or route message to author of this encounter.  Message will act as a verbal OK of orders requested below. Thank you.    ORDER    Requesting Skilled nursing visits 1xweek for 3 weeks and 2PRN.     Genesis Gonzalez RN  [phone]

## 2018-08-03 ENCOUNTER — TELEPHONE (OUTPATIENT)
Dept: FAMILY MEDICINE | Facility: CLINIC | Age: 53
End: 2018-08-03

## 2018-08-03 DIAGNOSIS — S22.071D CLOSED STABLE BURST FRACTURE OF NINTH THORACIC VERTEBRA WITH ROUTINE HEALING: ICD-10-CM

## 2018-08-03 DIAGNOSIS — S82.892D CLOSED FRACTURE OF BOTH ANKLES WITH ROUTINE HEALING: ICD-10-CM

## 2018-08-03 DIAGNOSIS — S82.891D CLOSED FRACTURE OF BOTH ANKLES WITH ROUTINE HEALING: ICD-10-CM

## 2018-08-03 DIAGNOSIS — R53.81 PHYSICAL DECONDITIONING: ICD-10-CM
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
## 2018-08-03 NOTE — TELEPHONE ENCOUNTER
Holli, a occupational therapist called with [facility] to request extended therapy for OT 1 time a week for 1 week and 2 times a week for 1 week.    Holli is also requesting that a rx for outpatient therapy be faxed to  at [phone] for OT & PT to begin the week of august 20 th     Holli can be reached at [phone] for an ok for orders
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
## 2018-08-06 DIAGNOSIS — J96.01 ACUTE RESPIRATORY FAILURE WITH HYPOXIA (H): ICD-10-CM

## 2018-08-06 NOTE — TELEPHONE ENCOUNTER
I received a fax from the patient's pharmacy requesting a prescription for 2 boxes of the Budesonide Solution at a time rather then the 1 box, per the patient's request.    Pending Prescriptions:                       Disp   Refills    budesonide (PULMICORT) 0.5 MG/2ML neb sol*2 Box  1            Sig: Take 2 mLs (0.5 mg) by nebulization 2 times daily    Dr. Martin please review and close encounter if approved.    Thank You,  Ruth Martinez, [facility]

## 2018-08-07 RX ORDER — BUDESONIDE 0.5 MG/2ML
0.5 INHALANT ORAL 2 TIMES DAILY
Qty: 2 BOX | Refills: 1 | Status: ON HOLD | OUTPATIENT
Start: 2018-08-07 | End: 2020-03-08

## 2018-08-07 NOTE — TELEPHONE ENCOUNTER
The forms have been completed/signed and faxed back to the number provided.    Ruth Martinez, [facility]

## 2018-08-07 NOTE — TELEPHONE ENCOUNTER
Written orders representing the verbal orders already given have been received and need to be signed.    Orders in pod 2 to be signed by Dr. Martin.    Ruth Martinez, CMA

## 2018-08-08 ENCOUNTER — TELEPHONE (OUTPATIENT)
Dept: FAMILY MEDICINE | Facility: CLINIC | Age: 53
End: 2018-08-08

## 2018-08-08 NOTE — TELEPHONE ENCOUNTER
Kristal a Case Nurse Manager from [facility] called:  States that the Pt stopped responding to her calls. She is asking if you think it is neccessary for her to be in contact with Pt that we reach out to Pt for her. If not, then she will close her case.    Please advise - [phone]
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
## 2018-08-09 NOTE — TELEPHONE ENCOUNTER
"I talked to patient RE note below. She stated that she talks with Kristal every week, usually on Mondays. Patient said that she will call Kristal today to see what is \" going on \".  "

## 2018-08-09 NOTE — TELEPHONE ENCOUNTER
I talked to Kristal, Nurse Manager with [facility]. They provide Information Support for the patient. She follows along with the treatment plan(s), they are a resource for the patient if they have any questions. Will help assist with what is needed.    Kristal stated that it is [facility] policy to call patients Primary Care Clinic / Provider to inform of what is going on and if they feel the need to have a Nurse  follow up when they do not get a response from the patient    I let Kristal know that we will reach out to patient to advise her of the Information Support that [facility] hospital offers.

## 2018-08-10 ENCOUNTER — OFFICE VISIT - HEALTHEAST (OUTPATIENT)
Dept: PULMONOLOGY | Facility: OTHER | Age: 53
End: 2018-08-10

## 2018-08-10 ENCOUNTER — MEDICAL CORRESPONDENCE (OUTPATIENT)
Dept: HEALTH INFORMATION MANAGEMENT | Facility: CLINIC | Age: 53
End: 2018-08-10

## 2018-08-10 ENCOUNTER — TRANSFERRED RECORDS (OUTPATIENT)
Dept: HEALTH INFORMATION MANAGEMENT | Facility: CLINIC | Age: 53
End: 2018-08-10

## 2018-08-10 DIAGNOSIS — J96.12 CHRONIC HYPERCAPNIC RESPIRATORY FAILURE (H): ICD-10-CM

## 2018-08-10 DIAGNOSIS — J94.2 HEMOTHORAX, RIGHT: ICD-10-CM

## 2018-08-10 DIAGNOSIS — J98.19 TRAPPED LUNG: ICD-10-CM

## 2018-08-10 ASSESSMENT — MIFFLIN-ST. JEOR: SCORE: 1960.27

## 2018-08-16 NOTE — PROGRESS NOTES
[facility] has sent the finalized Home Health Certification and Plan of Care for Dr. Martin to review and sign, in pod 2.    Thank You,  Ruth Martinez, [facility]  Phone: [phone]  Fax: [phone] [address] [place], [place] [address] [address]

## 2018-08-20 ENCOUNTER — COMMUNICATION - HEALTHEAST (OUTPATIENT)
Dept: PULMONOLOGY | Facility: OTHER | Age: 53
End: 2018-08-20

## 2018-08-23 ENCOUNTER — HOSPITAL ENCOUNTER (OUTPATIENT)
Dept: PHYSICAL THERAPY | Facility: CLINIC | Age: 53
Setting detail: THERAPIES SERIES
End: 2018-08-23
Attending: FAMILY MEDICINE
Payer: COMMERCIAL

## 2018-08-23 PROCEDURE — 40000718 ZZHC STATISTIC PT DEPARTMENT ORTHO VISIT: Performed by: PHYSICAL THERAPIST

## 2018-08-23 PROCEDURE — 97163 PT EVAL HIGH COMPLEX 45 MIN: CPT | Mod: GP | Performed by: PHYSICAL THERAPIST

## 2018-08-24 ENCOUNTER — HOSPITAL ENCOUNTER (OUTPATIENT)
Dept: OCCUPATIONAL THERAPY | Facility: CLINIC | Age: 53
Setting detail: THERAPIES SERIES
End: 2018-08-24
Attending: FAMILY MEDICINE
Payer: COMMERCIAL

## 2018-08-24 PROCEDURE — 97166 OT EVAL MOD COMPLEX 45 MIN: CPT | Mod: GO | Performed by: OCCUPATIONAL THERAPIST

## 2018-08-24 PROCEDURE — 97535 SELF CARE MNGMENT TRAINING: CPT | Mod: GO | Performed by: OCCUPATIONAL THERAPIST

## 2018-08-24 PROCEDURE — 40000125 ZZHC STATISTIC OT OUTPT VISIT: Performed by: OCCUPATIONAL THERAPIST

## 2018-08-24 ASSESSMENT — ACTIVITIES OF DAILY LIVING (ADL)
COMMUNICATION/COMPUTER_USE: WNL
IADL_QUICK_ADDS: MEAL PLANNING/PREPARATION;HOME/FINANCIAL/MANAGEMENT;COMMUNICATION/COMPUTER USE;COMMUNITY MOBILITY;CARE OF OTHERS
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
## 2018-08-27 ENCOUNTER — HOSPITAL ENCOUNTER (OUTPATIENT)
Dept: PHYSICAL THERAPY | Facility: CLINIC | Age: 53
Setting detail: THERAPIES SERIES
End: 2018-08-27
Attending: FAMILY MEDICINE
Payer: COMMERCIAL

## 2018-08-27 PROCEDURE — 97110 THERAPEUTIC EXERCISES: CPT | Mod: GP | Performed by: PHYSICAL THERAPIST

## 2018-08-27 PROCEDURE — 40000718 ZZHC STATISTIC PT DEPARTMENT ORTHO VISIT: Performed by: PHYSICAL THERAPIST

## 2018-08-29 ENCOUNTER — AMBULATORY - HEALTHEAST (OUTPATIENT)
Dept: PULMONOLOGY | Facility: OTHER | Age: 53
End: 2018-08-29

## 2018-08-29 ENCOUNTER — HOSPITAL ENCOUNTER (OUTPATIENT)
Dept: PHYSICAL THERAPY | Facility: CLINIC | Age: 53
Setting detail: THERAPIES SERIES
End: 2018-08-29
Attending: FAMILY MEDICINE
Payer: COMMERCIAL

## 2018-08-29 PROCEDURE — 97110 THERAPEUTIC EXERCISES: CPT | Mod: GP | Performed by: PHYSICAL THERAPIST

## 2018-08-29 PROCEDURE — 40000718 ZZHC STATISTIC PT DEPARTMENT ORTHO VISIT: Performed by: PHYSICAL THERAPIST

## 2018-09-04 ENCOUNTER — RECORDS - HEALTHEAST (OUTPATIENT)
Dept: ADMINISTRATIVE | Facility: OTHER | Age: 53
End: 2018-09-04

## 2018-09-05 ENCOUNTER — COMMUNICATION - HEALTHEAST (OUTPATIENT)
Dept: PULMONOLOGY | Facility: OTHER | Age: 53
End: 2018-09-05

## 2018-09-05 ENCOUNTER — HOSPITAL ENCOUNTER (OUTPATIENT)
Dept: CT IMAGING | Facility: CLINIC | Age: 53
Discharge: HOME | End: 2018-09-05
Attending: INTERNAL MEDICINE

## 2018-09-05 ENCOUNTER — HOSPITAL ENCOUNTER (OUTPATIENT)
Dept: RESPIRATORY THERAPY | Facility: CLINIC | Age: 53
Discharge: HOME | End: 2018-09-05
Attending: INTERNAL MEDICINE

## 2018-09-05 DIAGNOSIS — J96.12 CHRONIC HYPERCAPNIC RESPIRATORY FAILURE (H): ICD-10-CM

## 2018-09-05 DIAGNOSIS — J98.19 TRAPPED LUNG: ICD-10-CM

## 2018-09-05 LAB
BASE EXCESS BLDA CALC-SCNC: 9.5 MMOL/L
COHGB MFR BLD: 96 % (ref 96–97)
HCO3, ARTERIAL CALC - HISTORICAL: 32.2 MMOL/L (ref 23–29)
O2/TOTAL GAS SETTING VFR VENT: ABNORMAL %
OXYHEMOGLOBIN - HISTORICAL: 93.8 % (ref 96–97)
PCO2 BLD: 46 MM HG (ref 35–45)
PH BLD: 7.48 [PH] (ref 7.37–7.44)
PO2 BLD: 72 MM HG (ref 80–90)
TEMPERATURE: 37 DEGREES C
VENTILATION MODE: ABNORMAL

## 2018-09-06 ENCOUNTER — HOSPITAL ENCOUNTER (OUTPATIENT)
Dept: PHYSICAL THERAPY | Facility: CLINIC | Age: 53
Setting detail: THERAPIES SERIES
End: 2018-09-06
Attending: FAMILY MEDICINE
Payer: COMMERCIAL

## 2018-09-06 PROCEDURE — 97110 THERAPEUTIC EXERCISES: CPT | Mod: GP | Performed by: PHYSICAL THERAPIST

## 2018-09-06 PROCEDURE — 40000718 ZZHC STATISTIC PT DEPARTMENT ORTHO VISIT: Performed by: PHYSICAL THERAPIST

## 2018-09-06 PROCEDURE — 97116 GAIT TRAINING THERAPY: CPT | Mod: GP | Performed by: PHYSICAL THERAPIST

## 2018-09-10 ENCOUNTER — HOSPITAL ENCOUNTER (OUTPATIENT)
Dept: PHYSICAL THERAPY | Facility: CLINIC | Age: 53
Setting detail: THERAPIES SERIES
End: 2018-09-10
Attending: FAMILY MEDICINE
Payer: COMMERCIAL

## 2018-09-10 ENCOUNTER — TRANSFERRED RECORDS (OUTPATIENT)
Dept: FAMILY MEDICINE | Facility: CLINIC | Age: 53
End: 2018-09-10

## 2018-09-10 ENCOUNTER — COMMUNICATION - HEALTHEAST (OUTPATIENT)
Dept: PULMONOLOGY | Facility: OTHER | Age: 53
End: 2018-09-10

## 2018-09-10 PROCEDURE — 97112 NEUROMUSCULAR REEDUCATION: CPT | Mod: GP | Performed by: PHYSICAL THERAPIST

## 2018-09-10 PROCEDURE — 97110 THERAPEUTIC EXERCISES: CPT | Mod: GP | Performed by: PHYSICAL THERAPIST

## 2018-09-10 PROCEDURE — 40000718 ZZHC STATISTIC PT DEPARTMENT ORTHO VISIT: Performed by: PHYSICAL THERAPIST

## 2018-09-12 ENCOUNTER — HOSPITAL ENCOUNTER (OUTPATIENT)
Dept: OCCUPATIONAL THERAPY | Facility: CLINIC | Age: 53
Setting detail: THERAPIES SERIES
End: 2018-09-12
Attending: FAMILY MEDICINE
Payer: COMMERCIAL

## 2018-09-12 ENCOUNTER — RECORDS - HEALTHEAST (OUTPATIENT)
Dept: ADMINISTRATIVE | Facility: OTHER | Age: 53
End: 2018-09-12

## 2018-09-12 PROCEDURE — 40000125 ZZHC STATISTIC OT OUTPT VISIT: Performed by: OCCUPATIONAL THERAPIST

## 2018-09-12 PROCEDURE — 97140 MANUAL THERAPY 1/> REGIONS: CPT | Mod: GO | Performed by: OCCUPATIONAL THERAPIST

## 2018-09-12 PROCEDURE — 97535 SELF CARE MNGMENT TRAINING: CPT | Mod: GO | Performed by: OCCUPATIONAL THERAPIST

## 2018-09-13 ENCOUNTER — RECORDS - HEALTHEAST (OUTPATIENT)
Dept: RADIOLOGY | Facility: CLINIC | Age: 53
End: 2018-09-13

## 2018-09-14 ENCOUNTER — COMMUNICATION - HEALTHEAST (OUTPATIENT)
Dept: PULMONOLOGY | Facility: OTHER | Age: 53
End: 2018-09-14

## 2018-09-17 ENCOUNTER — HOSPITAL ENCOUNTER (OUTPATIENT)
Dept: PHYSICAL THERAPY | Facility: CLINIC | Age: 53
Setting detail: THERAPIES SERIES
End: 2018-09-17
Attending: FAMILY MEDICINE
Payer: COMMERCIAL

## 2018-09-17 ENCOUNTER — HOSPITAL ENCOUNTER (OUTPATIENT)
Dept: OCCUPATIONAL THERAPY | Facility: CLINIC | Age: 53
Setting detail: THERAPIES SERIES
End: 2018-09-17
Attending: FAMILY MEDICINE
Payer: COMMERCIAL

## 2018-09-17 PROCEDURE — 40000125 ZZHC STATISTIC OT OUTPT VISIT: Performed by: OCCUPATIONAL THERAPIST

## 2018-09-17 PROCEDURE — 97535 SELF CARE MNGMENT TRAINING: CPT | Mod: GO | Performed by: OCCUPATIONAL THERAPIST

## 2018-09-17 PROCEDURE — 40000718 ZZHC STATISTIC PT DEPARTMENT ORTHO VISIT: Performed by: PHYSICAL THERAPIST

## 2018-09-17 PROCEDURE — 97110 THERAPEUTIC EXERCISES: CPT | Mod: GP | Performed by: PHYSICAL THERAPIST

## 2018-09-19 ENCOUNTER — HOSPITAL ENCOUNTER (OUTPATIENT)
Dept: PHYSICAL THERAPY | Facility: CLINIC | Age: 53
Setting detail: THERAPIES SERIES
End: 2018-09-19
Attending: FAMILY MEDICINE
Payer: COMMERCIAL

## 2018-09-19 PROCEDURE — 97110 THERAPEUTIC EXERCISES: CPT | Mod: GP | Performed by: PHYSICAL THERAPIST

## 2018-09-19 PROCEDURE — 97116 GAIT TRAINING THERAPY: CPT | Mod: GP | Performed by: PHYSICAL THERAPIST

## 2018-09-19 PROCEDURE — 40000718 ZZHC STATISTIC PT DEPARTMENT ORTHO VISIT: Performed by: PHYSICAL THERAPIST

## 2018-09-24 ENCOUNTER — HOSPITAL ENCOUNTER (OUTPATIENT)
Dept: PHYSICAL THERAPY | Facility: CLINIC | Age: 53
Setting detail: THERAPIES SERIES
End: 2018-09-24
Attending: FAMILY MEDICINE
Payer: COMMERCIAL

## 2018-09-24 ENCOUNTER — HOSPITAL ENCOUNTER (OUTPATIENT)
Dept: OCCUPATIONAL THERAPY | Facility: CLINIC | Age: 53
Setting detail: THERAPIES SERIES
End: 2018-09-24
Attending: FAMILY MEDICINE
Payer: COMMERCIAL

## 2018-09-24 PROCEDURE — 40000718 ZZHC STATISTIC PT DEPARTMENT ORTHO VISIT: Performed by: PHYSICAL THERAPIST

## 2018-09-24 PROCEDURE — 97116 GAIT TRAINING THERAPY: CPT | Mod: GP | Performed by: PHYSICAL THERAPIST

## 2018-09-24 PROCEDURE — 97140 MANUAL THERAPY 1/> REGIONS: CPT | Mod: GO | Performed by: OCCUPATIONAL THERAPIST

## 2018-09-24 PROCEDURE — 40000125 ZZHC STATISTIC OT OUTPT VISIT: Performed by: OCCUPATIONAL THERAPIST

## 2018-09-24 PROCEDURE — 97110 THERAPEUTIC EXERCISES: CPT | Mod: GP | Performed by: PHYSICAL THERAPIST

## 2018-09-26 ENCOUNTER — TRANSFERRED RECORDS (OUTPATIENT)
Dept: HEALTH INFORMATION MANAGEMENT | Facility: CLINIC | Age: 53
End: 2018-09-26

## 2018-09-26 ENCOUNTER — OFFICE VISIT - HEALTHEAST (OUTPATIENT)
Dept: PULMONOLOGY | Facility: OTHER | Age: 53
End: 2018-09-26

## 2018-09-26 ENCOUNTER — TRANSFERRED RECORDS (OUTPATIENT)
Dept: FAMILY MEDICINE | Facility: CLINIC | Age: 53
End: 2018-09-26

## 2018-09-26 ENCOUNTER — HOSPITAL ENCOUNTER (OUTPATIENT)
Dept: RESPIRATORY THERAPY | Facility: CLINIC | Age: 53
Discharge: HOME | End: 2018-09-26
Attending: INTERNAL MEDICINE

## 2018-09-26 DIAGNOSIS — I50.30 (HFPEF) HEART FAILURE WITH PRESERVED EJECTION FRACTION (H): ICD-10-CM

## 2018-09-26 DIAGNOSIS — J96.12 CHRONIC HYPERCAPNIC RESPIRATORY FAILURE (H): ICD-10-CM

## 2018-09-26 DIAGNOSIS — J94.2 HEMOTHORAX, RIGHT: ICD-10-CM

## 2018-09-26 DIAGNOSIS — E66.01 MORBID OBESITY WITH BMI OF 60.0-69.9, ADULT (H): ICD-10-CM

## 2018-09-26 DIAGNOSIS — I89.0 LYMPH EDEMA: ICD-10-CM

## 2018-09-26 DIAGNOSIS — J98.19 TRAPPED LUNG: ICD-10-CM

## 2018-09-26 LAB — HGB BLD-MCNC: 12.8 G/DL (ref 12–16)

## 2018-09-26 ASSESSMENT — MIFFLIN-ST. JEOR: SCORE: 1993.83

## 2018-09-27 ENCOUNTER — HOSPITAL ENCOUNTER (OUTPATIENT)
Dept: PHYSICAL THERAPY | Facility: CLINIC | Age: 53
Setting detail: THERAPIES SERIES
End: 2018-09-27
Attending: FAMILY MEDICINE
Payer: COMMERCIAL

## 2018-09-27 PROCEDURE — 97116 GAIT TRAINING THERAPY: CPT | Mod: GP | Performed by: PHYSICAL THERAPIST

## 2018-09-27 PROCEDURE — 40000718 ZZHC STATISTIC PT DEPARTMENT ORTHO VISIT: Performed by: PHYSICAL THERAPIST

## 2018-09-27 PROCEDURE — 97110 THERAPEUTIC EXERCISES: CPT | Mod: GP | Performed by: PHYSICAL THERAPIST

## 2018-09-27 PROCEDURE — 97112 NEUROMUSCULAR REEDUCATION: CPT | Mod: GP | Performed by: PHYSICAL THERAPIST

## 2018-10-01 ENCOUNTER — HOSPITAL ENCOUNTER (OUTPATIENT)
Dept: OCCUPATIONAL THERAPY | Facility: CLINIC | Age: 53
Setting detail: THERAPIES SERIES
End: 2018-10-01
Attending: FAMILY MEDICINE
Payer: COMMERCIAL

## 2018-10-01 PROCEDURE — 97140 MANUAL THERAPY 1/> REGIONS: CPT | Mod: GO | Performed by: OCCUPATIONAL THERAPIST

## 2018-10-01 PROCEDURE — 40000125 ZZHC STATISTIC OT OUTPT VISIT: Performed by: OCCUPATIONAL THERAPIST

## 2018-10-01 NOTE — ADDENDUM NOTE
Encounter addended by: Wanda Souza OTR on: 10/1/2018 10:08 AM<BR>     Actions taken: Flowsheet accepted

## 2018-10-05 ENCOUNTER — AMBULATORY - HEALTHEAST (OUTPATIENT)
Dept: PULMONOLOGY | Facility: OTHER | Age: 53
End: 2018-10-05

## 2018-10-05 ENCOUNTER — TELEPHONE (OUTPATIENT)
Dept: FAMILY MEDICINE | Facility: CLINIC | Age: 53
End: 2018-10-05

## 2018-10-05 ENCOUNTER — COMMUNICATION - HEALTHEAST (OUTPATIENT)
Dept: PULMONOLOGY | Facility: OTHER | Age: 53
End: 2018-10-05

## 2018-10-05 DIAGNOSIS — J96.02 ACUTE RESPIRATORY FAILURE WITH HYPOXIA AND HYPERCAPNIA (H): ICD-10-CM

## 2018-10-05 DIAGNOSIS — J96.01 ACUTE RESPIRATORY FAILURE WITH HYPOXIA AND HYPERCAPNIA (H): ICD-10-CM

## 2018-10-05 DIAGNOSIS — J96.22 ACUTE AND CHRONIC RESPIRATORY FAILURE WITH HYPERCAPNIA (H): ICD-10-CM

## 2018-10-05 NOTE — TELEPHONE ENCOUNTER
Kristal, [facility]  called in stating that the patient was communicating with her a couple of times but she has not not heard from her at all. She has reached out multiple times and the patient has not answered her phone calls. She is wondering if Dr. Martin could tell the patient to reach out to her again if Dr. Martin feels it is beneficial for the patient to still get help from Kristal. She is not expecting a call back unless there are any questions and just wants this to be more of an FYI to Dr. Martin.     Kristal's call back number is [phone]

## 2018-10-08 ENCOUNTER — HOSPITAL ENCOUNTER (OUTPATIENT)
Dept: OCCUPATIONAL THERAPY | Facility: CLINIC | Age: 53
Setting detail: THERAPIES SERIES
End: 2018-10-08
Attending: FAMILY MEDICINE
Payer: COMMERCIAL

## 2018-10-08 ENCOUNTER — HOSPITAL ENCOUNTER (OUTPATIENT)
Dept: PHYSICAL THERAPY | Facility: CLINIC | Age: 53
Setting detail: THERAPIES SERIES
End: 2018-10-08
Attending: FAMILY MEDICINE
Payer: COMMERCIAL

## 2018-10-08 PROCEDURE — 97110 THERAPEUTIC EXERCISES: CPT | Mod: GP | Performed by: PHYSICAL THERAPIST

## 2018-10-08 PROCEDURE — 97112 NEUROMUSCULAR REEDUCATION: CPT | Mod: GP | Performed by: PHYSICAL THERAPIST

## 2018-10-08 PROCEDURE — 40000718 ZZHC STATISTIC PT DEPARTMENT ORTHO VISIT: Performed by: PHYSICAL THERAPIST

## 2018-10-08 PROCEDURE — 40000125 ZZHC STATISTIC OT OUTPT VISIT: Performed by: OCCUPATIONAL THERAPIST

## 2018-10-08 PROCEDURE — 97140 MANUAL THERAPY 1/> REGIONS: CPT | Mod: GO | Performed by: OCCUPATIONAL THERAPIST

## 2018-10-09 ENCOUNTER — AMBULATORY - HEALTHEAST (OUTPATIENT)
Dept: LAB | Facility: CLINIC | Age: 53
End: 2018-10-09

## 2018-10-09 DIAGNOSIS — J96.22 ACUTE AND CHRONIC RESPIRATORY FAILURE WITH HYPERCAPNIA (H): ICD-10-CM

## 2018-10-09 LAB
ANION GAP SERPL CALCULATED.3IONS-SCNC: 10 MMOL/L (ref 5–18)
BUN SERPL-MCNC: 22 MG/DL (ref 8–22)
CALCIUM SERPL-MCNC: 9.6 MG/DL (ref 8.5–10.5)
CHLORIDE BLD-SCNC: 98 MMOL/L (ref 98–107)
CO2 SERPL-SCNC: 31 MMOL/L (ref 22–31)
CREAT SERPL-MCNC: 0.83 MG/DL (ref 0.6–1.1)
GFR SERPL CREATININE-BSD FRML MDRD: >60 ML/MIN/1.73M2
GLUCOSE BLD-MCNC: 99 MG/DL (ref 70–125)
POTASSIUM BLD-SCNC: 3.8 MMOL/L (ref 3.5–5)
SODIUM SERPL-SCNC: 139 MMOL/L (ref 136–145)

## 2018-10-10 ENCOUNTER — HOSPITAL ENCOUNTER (OUTPATIENT)
Dept: PHYSICAL THERAPY | Facility: CLINIC | Age: 53
Setting detail: THERAPIES SERIES
End: 2018-10-10
Attending: FAMILY MEDICINE
Payer: COMMERCIAL

## 2018-10-10 ENCOUNTER — COMMUNICATION - HEALTHEAST (OUTPATIENT)
Dept: PULMONOLOGY | Facility: OTHER | Age: 53
End: 2018-10-10

## 2018-10-10 ENCOUNTER — TRANSFERRED RECORDS (OUTPATIENT)
Dept: FAMILY MEDICINE | Facility: CLINIC | Age: 53
End: 2018-10-10

## 2018-10-10 PROCEDURE — 40000718 ZZHC STATISTIC PT DEPARTMENT ORTHO VISIT: Performed by: PHYSICAL THERAPIST

## 2018-10-10 PROCEDURE — 97110 THERAPEUTIC EXERCISES: CPT | Mod: GP | Performed by: PHYSICAL THERAPIST

## 2018-10-12 ENCOUNTER — COMMUNICATION - HEALTHEAST (OUTPATIENT)
Dept: PULMONOLOGY | Facility: OTHER | Age: 53
End: 2018-10-12

## 2018-10-15 ENCOUNTER — HOSPITAL ENCOUNTER (OUTPATIENT)
Dept: PHYSICAL THERAPY | Facility: CLINIC | Age: 53
Setting detail: THERAPIES SERIES
End: 2018-10-15
Attending: FAMILY MEDICINE
Payer: COMMERCIAL

## 2018-10-15 ENCOUNTER — HOSPITAL ENCOUNTER (OUTPATIENT)
Dept: OCCUPATIONAL THERAPY | Facility: CLINIC | Age: 53
Setting detail: THERAPIES SERIES
End: 2018-10-15
Attending: FAMILY MEDICINE
Payer: COMMERCIAL

## 2018-10-15 PROCEDURE — 40000718 ZZHC STATISTIC PT DEPARTMENT ORTHO VISIT: Performed by: PHYSICAL THERAPIST

## 2018-10-15 PROCEDURE — 97110 THERAPEUTIC EXERCISES: CPT | Mod: GO | Performed by: OCCUPATIONAL THERAPIST

## 2018-10-15 PROCEDURE — 40000125 ZZHC STATISTIC OT OUTPT VISIT: Performed by: OCCUPATIONAL THERAPIST

## 2018-10-15 PROCEDURE — 97140 MANUAL THERAPY 1/> REGIONS: CPT | Mod: GO | Performed by: OCCUPATIONAL THERAPIST

## 2018-10-15 PROCEDURE — 97110 THERAPEUTIC EXERCISES: CPT | Mod: GP | Performed by: PHYSICAL THERAPIST

## 2018-10-15 PROCEDURE — 97116 GAIT TRAINING THERAPY: CPT | Mod: GP | Performed by: PHYSICAL THERAPIST

## 2018-10-16 DIAGNOSIS — L40.50 PSORIATIC ARTHROPATHY (H): ICD-10-CM

## 2018-10-16 NOTE — TELEPHONE ENCOUNTER
Received a refill for her calcipotriene ointment. Pt wants cream. Cream is not on her formulary    Pharmacy informed

## 2018-10-17 ENCOUNTER — TELEPHONE (OUTPATIENT)
Dept: OTHER | Facility: CLINIC | Age: 53
End: 2018-10-17

## 2018-10-17 ENCOUNTER — HOSPITAL ENCOUNTER (OUTPATIENT)
Dept: PHYSICAL THERAPY | Facility: CLINIC | Age: 53
Setting detail: THERAPIES SERIES
End: 2018-10-17
Attending: FAMILY MEDICINE
Payer: COMMERCIAL

## 2018-10-17 PROCEDURE — 97116 GAIT TRAINING THERAPY: CPT | Mod: GP | Performed by: PHYSICAL THERAPIST

## 2018-10-17 PROCEDURE — 40000718 ZZHC STATISTIC PT DEPARTMENT ORTHO VISIT: Performed by: PHYSICAL THERAPIST

## 2018-10-17 PROCEDURE — 97110 THERAPEUTIC EXERCISES: CPT | Mod: GP | Performed by: PHYSICAL THERAPIST
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
## 2018-10-17 NOTE — TELEPHONE ENCOUNTER
"Patient has history of Lymphedema and bilateral leg swelling per [facility] care everywhere- \"May need wrapping through vascular clinic to manage the swelling, hard to even get shoes on at this point and unable to put on TEDs herself--Lois Villalobos MD\" No recent imaging in care everywhere.     Please schedule patient for consult with vascular medicine at next available. Will route back to scheduling to contact patient.    Nkechi SHERMANN, RN    "

## 2018-10-17 NOTE — TELEPHONE ENCOUNTER
Spoke with Mirta, she is scheduled to see Dr. Terrazas in [place] on 10/23/18. Valeria Leong,

## 2018-10-18 ENCOUNTER — COMMUNICATION - HEALTHEAST (OUTPATIENT)
Dept: PULMONOLOGY | Facility: OTHER | Age: 53
End: 2018-10-18

## 2018-10-18 ENCOUNTER — MYC MEDICAL ADVICE (OUTPATIENT)
Dept: FAMILY MEDICINE | Facility: CLINIC | Age: 53
End: 2018-10-18

## 2018-10-18 DIAGNOSIS — J96.01 ACUTE RESPIRATORY FAILURE WITH HYPOXIA AND HYPERCAPNIA (H): ICD-10-CM

## 2018-10-18 DIAGNOSIS — L40.50 PSORIATIC ARTHROPATHY (H): ICD-10-CM

## 2018-10-18 DIAGNOSIS — J96.02 ACUTE RESPIRATORY FAILURE WITH HYPOXIA AND HYPERCAPNIA (H): ICD-10-CM

## 2018-10-18 NOTE — TELEPHONE ENCOUNTER
From: Mirta Decker  To: Yani Martin MD  Sent: 10/18/2018 12:36 PM CDT  Subject: Question about medications    I requested my pharmacy to contact your office to change the Dovonex Ointment back to the Dovonex cream. Your office denied the request because of the formulary look-up. This has been covered since leaving the hospital and I prefer to use this as the ointment I will not use regularly due to the mess it leaves. Can you please call in a refill for the cream as this is what I prefer. Thank you

## 2018-10-18 NOTE — TELEPHONE ENCOUNTER
Mirta Decker is requesting a refill of:    Pending Prescriptions:                       Disp   Refills    calcipotriene (DOVONOX) 0.005 % cream     360 g  1            Sig: Apply topically At Bedtime

## 2018-10-19 RX ORDER — CALCIPOTRIENE 50 UG/G
CREAM TOPICAL AT BEDTIME
Qty: 360 G | Refills: 0 | Status: ON HOLD | OUTPATIENT
Start: 2018-10-19 | End: 2020-03-08

## 2018-10-22 ENCOUNTER — HOSPITAL ENCOUNTER (OUTPATIENT)
Dept: PHYSICAL THERAPY | Facility: CLINIC | Age: 53
Setting detail: THERAPIES SERIES
End: 2018-10-22
Attending: FAMILY MEDICINE
Payer: COMMERCIAL

## 2018-10-22 ENCOUNTER — HOSPITAL ENCOUNTER (OUTPATIENT)
Dept: OCCUPATIONAL THERAPY | Facility: CLINIC | Age: 53
Setting detail: THERAPIES SERIES
End: 2018-10-22
Attending: FAMILY MEDICINE
Payer: COMMERCIAL

## 2018-10-22 PROCEDURE — 40000125 ZZHC STATISTIC OT OUTPT VISIT: Performed by: OCCUPATIONAL THERAPIST

## 2018-10-22 PROCEDURE — 97140 MANUAL THERAPY 1/> REGIONS: CPT | Mod: GO | Performed by: OCCUPATIONAL THERAPIST

## 2018-10-22 PROCEDURE — 97112 NEUROMUSCULAR REEDUCATION: CPT | Mod: GP | Performed by: PHYSICAL THERAPIST

## 2018-10-22 PROCEDURE — 40000718 ZZHC STATISTIC PT DEPARTMENT ORTHO VISIT: Performed by: PHYSICAL THERAPIST

## 2018-10-22 PROCEDURE — 97110 THERAPEUTIC EXERCISES: CPT | Mod: GP | Performed by: PHYSICAL THERAPIST

## 2018-10-23 ENCOUNTER — OFFICE VISIT (OUTPATIENT)
Dept: SURGERY | Facility: CLINIC | Age: 53
End: 2018-10-23
Payer: COMMERCIAL

## 2018-10-23 VITALS
WEIGHT: 293 LBS | OXYGEN SATURATION: 92 % | RESPIRATION RATE: 16 BRPM | HEIGHT: 60 IN | DIASTOLIC BLOOD PRESSURE: 70 MMHG | HEART RATE: 85 BPM | SYSTOLIC BLOOD PRESSURE: 130 MMHG | BODY MASS INDEX: 57.52 KG/M2

## 2018-10-23 DIAGNOSIS — E66.01 MORBID OBESITY WITH BMI OF 60.0-69.9, ADULT (H): Primary | ICD-10-CM

## 2018-10-23 DIAGNOSIS — I89.0 LYMPHEDEMA IN ADULT PATIENT: ICD-10-CM

## 2018-10-23 DIAGNOSIS — M79.89 LEG SWELLING: ICD-10-CM

## 2018-10-23 PROCEDURE — 99204 OFFICE O/P NEW MOD 45 MIN: CPT | Performed by: INTERNAL MEDICINE

## 2018-10-23 ASSESSMENT — ENCOUNTER SYMPTOMS
CONSTIPATION: 1
WEIGHT GAIN: 1
WEIGHT LOSS: 1
CLAUDICATION: 1
ARTHRALGIAS: 1
NAUSEA: 1
DIARRHEA: 1
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
## 2018-10-23 NOTE — PROGRESS NOTES
HPI      ROS (Review of Systems):      Positive for weight loss, weight gain and hoarseness.   Respiratory: Positive for dyspnea.    Cardiovascular: Positive for leg pain and hypertension.   GASTROINTESTINAL: Positive for nausea, diarrhea and constipation.   MUSCULOSKELETAL: Positive for arthralgias and back pain.          Physical Exam

## 2018-10-24 ENCOUNTER — MYC MEDICAL ADVICE (OUTPATIENT)
Dept: FAMILY MEDICINE | Facility: CLINIC | Age: 53
End: 2018-10-24

## 2018-10-24 ENCOUNTER — HOSPITAL ENCOUNTER (OUTPATIENT)
Dept: PHYSICAL THERAPY | Facility: CLINIC | Age: 53
Setting detail: THERAPIES SERIES
End: 2018-10-24
Attending: FAMILY MEDICINE
Payer: COMMERCIAL

## 2018-10-24 ENCOUNTER — TELEPHONE (OUTPATIENT)
Dept: OTHER | Facility: CLINIC | Age: 53
End: 2018-10-24

## 2018-10-24 ENCOUNTER — HOSPITAL ENCOUNTER (OUTPATIENT)
Dept: OCCUPATIONAL THERAPY | Facility: CLINIC | Age: 53
Setting detail: THERAPIES SERIES
End: 2018-10-24
Attending: FAMILY MEDICINE
Payer: COMMERCIAL

## 2018-10-24 PROCEDURE — 97112 NEUROMUSCULAR REEDUCATION: CPT | Mod: GP | Performed by: PHYSICAL THERAPIST

## 2018-10-24 PROCEDURE — 40000125 ZZHC STATISTIC OT OUTPT VISIT: Performed by: OCCUPATIONAL THERAPIST

## 2018-10-24 PROCEDURE — 97110 THERAPEUTIC EXERCISES: CPT | Mod: GP | Performed by: PHYSICAL THERAPIST

## 2018-10-24 PROCEDURE — 40000718 ZZHC STATISTIC PT DEPARTMENT ORTHO VISIT: Performed by: PHYSICAL THERAPIST

## 2018-10-24 PROCEDURE — 97116 GAIT TRAINING THERAPY: CPT | Mod: GP | Performed by: PHYSICAL THERAPIST

## 2018-10-24 PROCEDURE — 97140 MANUAL THERAPY 1/> REGIONS: CPT | Mod: GO | Performed by: OCCUPATIONAL THERAPIST

## 2018-10-24 NOTE — TELEPHONE ENCOUNTER
Called and left a message for the patient to call us back to schedule an ultrasound and a follow up appointment with Dr. Terrazas.
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
## 2018-10-24 NOTE — PROGRESS NOTES
--OCCUPATIONAL THERAPY PROGRESS NOTE--      Thank you for this thoughtful referral! If you have any questions, please call me [phone].  Nice to share in this patient's care with you.    Sincerely,   Wanda Souza, OTR/l

## 2018-10-25 ENCOUNTER — HOSPITAL ENCOUNTER (OUTPATIENT)
Dept: CARDIOLOGY | Facility: CLINIC | Age: 53
Discharge: HOME | End: 2018-10-25
Attending: INTERNAL MEDICINE | Admitting: INTERNAL MEDICINE
Payer: COMMERCIAL

## 2018-10-25 DIAGNOSIS — I89.0 LYMPHEDEMA IN ADULT PATIENT: ICD-10-CM

## 2018-10-25 DIAGNOSIS — M79.89 LEG SWELLING: ICD-10-CM

## 2018-10-25 PROCEDURE — 93306 TTE W/DOPPLER COMPLETE: CPT | Mod: 26 | Performed by: INTERNAL MEDICINE

## 2018-10-25 PROCEDURE — 93306 TTE W/DOPPLER COMPLETE: CPT

## 2018-10-25 PROCEDURE — 25500064 ZZH RX 255 OP 636: Performed by: INTERNAL MEDICINE

## 2018-10-25 RX ADMIN — HUMAN ALBUMIN MICROSPHERES AND PERFLUTREN 3 ML: 10; .22 INJECTION, SOLUTION INTRAVENOUS at 15:45

## 2018-10-29 ENCOUNTER — TELEPHONE (OUTPATIENT)
Dept: FAMILY MEDICINE | Facility: CLINIC | Age: 53
End: 2018-10-29

## 2018-10-29 ENCOUNTER — HOSPITAL ENCOUNTER (OUTPATIENT)
Dept: PHYSICAL THERAPY | Facility: CLINIC | Age: 53
Setting detail: THERAPIES SERIES
End: 2018-10-29
Attending: FAMILY MEDICINE
Payer: COMMERCIAL

## 2018-10-29 ENCOUNTER — HOSPITAL ENCOUNTER (OUTPATIENT)
Dept: CARDIAC REHAB | Facility: CLINIC | Age: 53
End: 2018-10-29
Attending: INTERNAL MEDICINE
Payer: COMMERCIAL

## 2018-10-29 PROCEDURE — 97112 NEUROMUSCULAR REEDUCATION: CPT | Mod: GP | Performed by: PHYSICAL THERAPIST

## 2018-10-29 PROCEDURE — G0238 OTH RESP PROC, INDIV: HCPCS

## 2018-10-29 PROCEDURE — 40000244 ZZH STATISTIC VISIT PULM REHAB

## 2018-10-29 PROCEDURE — 40000718 ZZHC STATISTIC PT DEPARTMENT ORTHO VISIT: Performed by: PHYSICAL THERAPIST

## 2018-10-29 PROCEDURE — G0237 THERAPEUTIC PROCD STRG ENDUR: HCPCS

## 2018-10-30 ENCOUNTER — OFFICE VISIT (OUTPATIENT)
Dept: FAMILY MEDICINE | Facility: CLINIC | Age: 53
End: 2018-10-30

## 2018-10-30 VITALS
WEIGHT: 293 LBS | SYSTOLIC BLOOD PRESSURE: 132 MMHG | RESPIRATION RATE: 16 BRPM | HEART RATE: 87 BPM | HEIGHT: 60 IN | OXYGEN SATURATION: 94 % | BODY MASS INDEX: 57.52 KG/M2 | TEMPERATURE: 98.8 F | DIASTOLIC BLOOD PRESSURE: 78 MMHG

## 2018-10-30 DIAGNOSIS — I10 ESSENTIAL HYPERTENSION, BENIGN: Primary | ICD-10-CM

## 2018-10-30 DIAGNOSIS — Z23 NEED FOR VACCINATION: ICD-10-CM

## 2018-10-30 DIAGNOSIS — D50.0 IRON DEFICIENCY ANEMIA DUE TO CHRONIC BLOOD LOSS: ICD-10-CM

## 2018-10-30 DIAGNOSIS — K21.9 GASTROESOPHAGEAL REFLUX DISEASE WITHOUT ESOPHAGITIS: ICD-10-CM

## 2018-10-30 DIAGNOSIS — L40.50 PSORIATIC ARTHROPATHY (H): ICD-10-CM

## 2018-10-30 DIAGNOSIS — K59.03 DRUG-INDUCED CONSTIPATION: ICD-10-CM

## 2018-10-30 DIAGNOSIS — J30.1 CHRONIC SEASONAL ALLERGIC RHINITIS DUE TO POLLEN: ICD-10-CM

## 2018-10-30 DIAGNOSIS — K13.79 MOUTH SORES: ICD-10-CM
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
## 2018-10-30 DIAGNOSIS — B37.2 YEAST INFECTION OF THE SKIN: ICD-10-CM

## 2018-10-30 PROBLEM — I50.30 (HFPEF) HEART FAILURE WITH PRESERVED EJECTION FRACTION (H): Status: ACTIVE | Noted: 2018-09-26

## 2018-10-30 PROBLEM — I89.0 LYMPH EDEMA: Status: ACTIVE | Noted: 2018-09-26

## 2018-10-30 LAB
% GRANULOCYTES: 75.2 %
HCT VFR BLD AUTO: 39.2 % (ref 35–47)
HEMOGLOBIN: 12.4 G/DL (ref 11.7–15.7)
LYMPHOCYTES NFR BLD AUTO: 18.8 %
MCH RBC QN AUTO: 25.4 PG (ref 26–33)
MCHC RBC AUTO-ENTMCNC: 31.6 G/DL (ref 31–36)
MCV RBC AUTO: 80.3 FL (ref 78–100)
MONOCYTES NFR BLD AUTO: 6 %
PLATELET COUNT - QUEST: 305 10^9/L (ref 150–375)
RBC # BLD AUTO: 4.88 10*12/L (ref 3.8–5.2)
WBC # BLD AUTO: 8.7 10*9/L (ref 4–11)

## 2018-10-30 PROCEDURE — 85025 COMPLETE CBC W/AUTO DIFF WBC: CPT | Performed by: FAMILY MEDICINE

## 2018-10-30 PROCEDURE — 90471 IMMUNIZATION ADMIN: CPT | Performed by: FAMILY MEDICINE

## 2018-10-30 PROCEDURE — 36415 COLL VENOUS BLD VENIPUNCTURE: CPT | Performed by: FAMILY MEDICINE

## 2018-10-30 PROCEDURE — 90686 IIV4 VACC NO PRSV 0.5 ML IM: CPT | Performed by: FAMILY MEDICINE

## 2018-10-30 PROCEDURE — 99214 OFFICE O/P EST MOD 30 MIN: CPT | Mod: 25 | Performed by: FAMILY MEDICINE

## 2018-10-30 RX ORDER — POLYETHYLENE GLYCOL 3350 17 G/17G
1 POWDER, FOR SOLUTION ORAL DAILY
Qty: 100 PACKET | Refills: 0 | Status: SHIPPED | OUTPATIENT
Start: 2018-10-30 | End: 2021-09-09

## 2018-10-30 RX ORDER — SENNOSIDES 8.6 MG/1
TABLET ORAL
Qty: 90 TABLET | Refills: 0 | Status: SHIPPED | OUTPATIENT
Start: 2018-10-30 | End: 2019-03-19

## 2018-10-30 RX ORDER — ACETAMINOPHEN 325 MG/1
TABLET ORAL
Qty: 180 TABLET | Refills: 1 | Status: SHIPPED | OUTPATIENT
Start: 2018-10-30 | End: 2019-02-18

## 2018-10-30 RX ORDER — FERROUS SULFATE 325(65) MG
325 TABLET ORAL DAILY
Qty: 90 TABLET | Refills: 0 | Status: SHIPPED | OUTPATIENT
Start: 2018-10-30 | End: 2019-02-18

## 2018-10-30 RX ORDER — FLUTICASONE PROPIONATE 50 MCG
1 SPRAY, SUSPENSION (ML) NASAL DAILY
Qty: 3 BOTTLE | Refills: 3 | Status: SHIPPED | OUTPATIENT
Start: 2018-10-30 | End: 2020-01-22

## 2018-10-30 RX ORDER — CETIRIZINE HYDROCHLORIDE 10 MG/1
TABLET ORAL
Qty: 90 TABLET | Refills: 1 | Status: SHIPPED | OUTPATIENT
Start: 2018-10-30 | End: 2019-05-30

## 2018-10-30 RX ORDER — ASCORBIC ACID 500 MG
500 TABLET ORAL DAILY
Qty: 90 TABLET | Refills: 0 | Status: SHIPPED | OUTPATIENT
Start: 2018-10-30 | End: 2019-03-19

## 2018-10-30 RX ORDER — CHLORHEXIDINE GLUCONATE 20 %
15 SOLUTION, NON-ORAL MISCELLANEOUS 2 TIMES DAILY
Qty: 500 ML | Refills: 1 | Status: SHIPPED | OUTPATIENT
Start: 2018-10-30 | End: 2020-01-22

## 2018-10-30 RX ORDER — MAGNESIUM OXIDE 400 MG/1
TABLET ORAL
Qty: 120 TABLET | Refills: 1 | Status: SHIPPED | OUTPATIENT
Start: 2018-10-30 | End: 2019-02-18

## 2018-10-30 RX ORDER — NYSTATIN 100000 [USP'U]/G
POWDER TOPICAL 2 TIMES DAILY
Qty: 180 G | Refills: 3 | Status: ON HOLD | OUTPATIENT
Start: 2018-10-30 | End: 2020-03-08

## 2018-10-30 RX ORDER — LOSARTAN POTASSIUM 100 MG/1
100 TABLET ORAL DAILY
Qty: 90 TABLET | Refills: 0 | Status: SHIPPED | OUTPATIENT
Start: 2018-10-30 | End: 2019-02-26

## 2018-10-30 RX ORDER — FAMOTIDINE 20 MG/1
20 TABLET, FILM COATED ORAL 2 TIMES DAILY
Qty: 180 TABLET | Refills: 0 | Status: SHIPPED | OUTPATIENT
Start: 2018-10-30 | End: 2019-03-19

## 2018-10-30 NOTE — NURSING NOTE
Mirta is here for med check    Pre-visit Screening:  Immunizations:  Flu shot today  Colonoscopy:  is up to date  Mammogram: is up to date  Asthma Action Test/Plan:  NA  PHQ9:  NA  GAD7:  NA  Questioned patient about current smoking habits Pt. has never smoked.  Ok to leave detailed message on voice mail for today's visit only Yes, phone # [phone]
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
## 2018-10-31 ENCOUNTER — TELEPHONE (OUTPATIENT)
Dept: FAMILY MEDICINE | Facility: CLINIC | Age: 53
End: 2018-10-31

## 2018-10-31 ENCOUNTER — HOSPITAL ENCOUNTER (OUTPATIENT)
Dept: PHYSICAL THERAPY | Facility: CLINIC | Age: 53
Setting detail: THERAPIES SERIES
End: 2018-10-31
Attending: FAMILY MEDICINE
Payer: COMMERCIAL

## 2018-10-31 ENCOUNTER — HOSPITAL ENCOUNTER (OUTPATIENT)
Dept: OCCUPATIONAL THERAPY | Facility: CLINIC | Age: 53
Setting detail: THERAPIES SERIES
End: 2018-10-31
Attending: FAMILY MEDICINE
Payer: COMMERCIAL
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
## 2018-10-31 PROCEDURE — 40000718 ZZHC STATISTIC PT DEPARTMENT ORTHO VISIT: Performed by: PHYSICAL THERAPIST

## 2018-10-31 PROCEDURE — 97112 NEUROMUSCULAR REEDUCATION: CPT | Mod: GP | Performed by: PHYSICAL THERAPIST

## 2018-10-31 PROCEDURE — 40000125 ZZHC STATISTIC OT OUTPT VISIT: Performed by: OCCUPATIONAL THERAPIST

## 2018-10-31 PROCEDURE — 97110 THERAPEUTIC EXERCISES: CPT | Mod: GP | Performed by: PHYSICAL THERAPIST

## 2018-10-31 PROCEDURE — 97750 PHYSICAL PERFORMANCE TEST: CPT | Mod: GP | Performed by: PHYSICAL THERAPIST

## 2018-10-31 PROCEDURE — 97140 MANUAL THERAPY 1/> REGIONS: CPT | Mod: GO | Performed by: OCCUPATIONAL THERAPIST

## 2018-10-31 PROCEDURE — 97110 THERAPEUTIC EXERCISES: CPT | Mod: GO | Performed by: OCCUPATIONAL THERAPIST

## 2018-10-31 RX ORDER — ONDANSETRON 4 MG/1
4 TABLET, FILM COATED ORAL EVERY 8 HOURS PRN
Qty: 30 TABLET | Refills: 0 | Status: SHIPPED | OUTPATIENT
Start: 2018-10-31

## 2018-10-31 NOTE — PATIENT INSTRUCTIONS
Schedule visits with Dermatology, Rheumatology/ have them take over medications  Back to Cardiology and pulmonary specialists    1)  Medication: continue current medication regimen unchanged  2)  Dietary sodium restriction  3)  Regular aerobic exercise  4)  Recheck in 3 months fasting, sooner should new symptoms or   problems arise.    Patient Education: Reviewed risks of hypertension and principles of   treatment.

## 2018-10-31 NOTE — TELEPHONE ENCOUNTER
Pharmacy is calling stating they cannot get the ondansetron in film form and they are only able to get this as a dissolving tablet.  This is covered by her insurance so they are asking permission to change the prescription to the tablet?  Please advise    [facility] - [phone]

## 2018-10-31 NOTE — PROGRESS NOTES
10/31/18 1200   Signing Clinician's Name / Credentials   Signing clinician's name / credentials Windy Buckley PT   Dynamic Gait Index (Cruz and Tripp Chatham, 1995)   Gait Level Surface 2  (with SEC)   Change in Gait Speed 2   Gait and Horizontal Head Turns 3   Gait with Vertical Head Turns 3   Gait and Pivot Turns 1  (very slow)   Step Over Obstacle 0   Step Around Obstacles 3   Steps 1   Total Dynamic Gait Index Score  (A score of 19 or less has been correlated to an increased risk of falls in community dwelling older adults, patients with vestibular disorders, and patients with MS.)   Total Score (out of 24) 15   Dynamic Gait Index (DGI):The DGI is a measure of balance during gait that is reliable and valid for the elderly and individuals with Parkinson's disease, MS, vestibular disorders, or s/p stroke. Gait assistive device used: SEC    Patient score: 15/24  Scores ?19/24 indicate an increased risk for falls according to Rosina et al 2000  Minimal Detectable Change = 2.9 in community dwelling elderly according to Adi et al 2011    Assessment (rationale for performing, application to patient s function & care plan): Assessment of gait stability now that patient has become more mobile and is walking with less assist, to determine current fall risk and guide POC. Patients score of 15/24 indicates she is at an increased fall risk and will benefit from continued skilled intervention for safe and effective progress towards mobility goals.    Minutes billed as physical performance test: 10

## 2018-11-02 ENCOUNTER — HOSPITAL ENCOUNTER (OUTPATIENT)
Dept: OCCUPATIONAL THERAPY | Facility: CLINIC | Age: 53
Setting detail: THERAPIES SERIES
End: 2018-11-02
Attending: INTERNAL MEDICINE
Payer: COMMERCIAL

## 2018-11-02 ENCOUNTER — HOSPITAL ENCOUNTER (OUTPATIENT)
Dept: OCCUPATIONAL THERAPY | Facility: CLINIC | Age: 53
Setting detail: THERAPIES SERIES
End: 2018-11-02
Attending: FAMILY MEDICINE
Payer: COMMERCIAL

## 2018-11-02 DIAGNOSIS — I89.0 LYMPHEDEMA IN ADULT PATIENT: ICD-10-CM

## 2018-11-02 DIAGNOSIS — M79.89 LEG SWELLING: ICD-10-CM

## 2018-11-02 PROCEDURE — 40000445 ZZHC STATISTIC OT VISIT, LYMPHEDEMA

## 2018-11-02 PROCEDURE — 97165 OT EVAL LOW COMPLEX 30 MIN: CPT | Mod: GO

## 2018-11-02 PROCEDURE — 97140 MANUAL THERAPY 1/> REGIONS: CPT | Mod: GO | Performed by: OCCUPATIONAL THERAPIST

## 2018-11-02 PROCEDURE — 97535 SELF CARE MNGMENT TRAINING: CPT | Mod: GO

## 2018-11-02 PROCEDURE — 40000125 ZZHC STATISTIC OT OUTPT VISIT: Performed by: OCCUPATIONAL THERAPIST
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
## 2018-11-05 ENCOUNTER — HOSPITAL ENCOUNTER (OUTPATIENT)
Dept: CARDIAC REHAB | Facility: CLINIC | Age: 53
End: 2018-11-05
Attending: INTERNAL MEDICINE
Payer: COMMERCIAL

## 2018-11-05 PROCEDURE — 40000244 ZZH STATISTIC VISIT PULM REHAB

## 2018-11-05 PROCEDURE — G0237 THERAPEUTIC PROCD STRG ENDUR: HCPCS

## 2018-11-05 PROCEDURE — G0238 OTH RESP PROC, INDIV: HCPCS

## 2018-11-07 ENCOUNTER — HOSPITAL ENCOUNTER (OUTPATIENT)
Dept: OCCUPATIONAL THERAPY | Facility: CLINIC | Age: 53
Setting detail: THERAPIES SERIES
End: 2018-11-07
Attending: FAMILY MEDICINE
Payer: COMMERCIAL

## 2018-11-07 ENCOUNTER — HOSPITAL ENCOUNTER (OUTPATIENT)
Dept: PHYSICAL THERAPY | Facility: CLINIC | Age: 53
Setting detail: THERAPIES SERIES
End: 2018-11-07
Attending: FAMILY MEDICINE
Payer: COMMERCIAL

## 2018-11-07 ENCOUNTER — COMMUNICATION - HEALTHEAST (OUTPATIENT)
Dept: PULMONOLOGY | Facility: OTHER | Age: 53
End: 2018-11-07

## 2018-11-07 DIAGNOSIS — J96.02 ACUTE RESPIRATORY FAILURE WITH HYPOXIA AND HYPERCAPNIA (H): ICD-10-CM

## 2018-11-07 DIAGNOSIS — J96.01 ACUTE RESPIRATORY FAILURE WITH HYPOXIA AND HYPERCAPNIA (H): ICD-10-CM

## 2018-11-07 PROCEDURE — 97110 THERAPEUTIC EXERCISES: CPT | Mod: GP | Performed by: PHYSICAL THERAPIST

## 2018-11-07 PROCEDURE — 40000125 ZZHC STATISTIC OT OUTPT VISIT: Performed by: OCCUPATIONAL THERAPIST

## 2018-11-07 PROCEDURE — 97116 GAIT TRAINING THERAPY: CPT | Mod: GP | Performed by: PHYSICAL THERAPIST

## 2018-11-07 PROCEDURE — 97112 NEUROMUSCULAR REEDUCATION: CPT | Mod: GP | Performed by: PHYSICAL THERAPIST

## 2018-11-07 PROCEDURE — 97140 MANUAL THERAPY 1/> REGIONS: CPT | Mod: GO | Performed by: OCCUPATIONAL THERAPIST

## 2018-11-07 PROCEDURE — 40000718 ZZHC STATISTIC PT DEPARTMENT ORTHO VISIT: Performed by: PHYSICAL THERAPIST

## 2018-11-09 ENCOUNTER — HOSPITAL ENCOUNTER (OUTPATIENT)
Dept: OCCUPATIONAL THERAPY | Facility: CLINIC | Age: 53
Setting detail: THERAPIES SERIES
End: 2018-11-09
Attending: FAMILY MEDICINE
Payer: COMMERCIAL

## 2018-11-09 PROCEDURE — 97140 MANUAL THERAPY 1/> REGIONS: CPT | Mod: GO | Performed by: OCCUPATIONAL THERAPIST

## 2018-11-09 PROCEDURE — 40000125 ZZHC STATISTIC OT OUTPT VISIT: Performed by: OCCUPATIONAL THERAPIST

## 2018-11-12 ENCOUNTER — HOSPITAL ENCOUNTER (OUTPATIENT)
Dept: CARDIAC REHAB | Facility: CLINIC | Age: 53
End: 2018-11-12
Attending: INTERNAL MEDICINE
Payer: COMMERCIAL

## 2018-11-12 ENCOUNTER — HOSPITAL ENCOUNTER (OUTPATIENT)
Dept: OCCUPATIONAL THERAPY | Facility: CLINIC | Age: 53
Setting detail: THERAPIES SERIES
End: 2018-11-12
Attending: FAMILY MEDICINE
Payer: COMMERCIAL

## 2018-11-12 PROCEDURE — G0239 OTH RESP PROC, GROUP: HCPCS

## 2018-11-12 PROCEDURE — 40000244 ZZH STATISTIC VISIT PULM REHAB

## 2018-11-12 PROCEDURE — 40000125 ZZHC STATISTIC OT OUTPT VISIT: Performed by: OCCUPATIONAL THERAPIST

## 2018-11-12 PROCEDURE — 97140 MANUAL THERAPY 1/> REGIONS: CPT | Mod: GO | Performed by: OCCUPATIONAL THERAPIST

## 2018-11-14 ENCOUNTER — HOSPITAL ENCOUNTER (OUTPATIENT)
Dept: PHYSICAL THERAPY | Facility: CLINIC | Age: 53
Setting detail: THERAPIES SERIES
End: 2018-11-14
Attending: FAMILY MEDICINE
Payer: COMMERCIAL

## 2018-11-14 ENCOUNTER — HOSPITAL ENCOUNTER (OUTPATIENT)
Dept: OCCUPATIONAL THERAPY | Facility: CLINIC | Age: 53
Setting detail: THERAPIES SERIES
End: 2018-11-14
Attending: FAMILY MEDICINE
Payer: COMMERCIAL

## 2018-11-14 PROCEDURE — 97112 NEUROMUSCULAR REEDUCATION: CPT | Mod: GP | Performed by: PHYSICAL THERAPIST

## 2018-11-14 PROCEDURE — 40000125 ZZHC STATISTIC OT OUTPT VISIT: Performed by: OCCUPATIONAL THERAPIST

## 2018-11-14 PROCEDURE — 40000718 ZZHC STATISTIC PT DEPARTMENT ORTHO VISIT: Performed by: PHYSICAL THERAPIST

## 2018-11-14 PROCEDURE — 97110 THERAPEUTIC EXERCISES: CPT | Mod: GP | Performed by: PHYSICAL THERAPIST

## 2018-11-14 PROCEDURE — 97140 MANUAL THERAPY 1/> REGIONS: CPT | Mod: GO | Performed by: OCCUPATIONAL THERAPIST

## 2018-11-15 ENCOUNTER — HOSPITAL ENCOUNTER (OUTPATIENT)
Dept: OCCUPATIONAL THERAPY | Facility: CLINIC | Age: 53
Setting detail: THERAPIES SERIES
End: 2018-11-15
Attending: INTERNAL MEDICINE
Payer: COMMERCIAL

## 2018-11-15 ENCOUNTER — HOSPITAL ENCOUNTER (OUTPATIENT)
Dept: CARDIAC REHAB | Facility: CLINIC | Age: 53
End: 2018-11-15
Attending: INTERNAL MEDICINE
Payer: COMMERCIAL

## 2018-11-15 PROCEDURE — 40000244 ZZH STATISTIC VISIT PULM REHAB

## 2018-11-15 PROCEDURE — G0239 OTH RESP PROC, GROUP: HCPCS

## 2018-11-15 PROCEDURE — 40000445 ZZHC STATISTIC OT VISIT, LYMPHEDEMA

## 2018-11-15 PROCEDURE — 97535 SELF CARE MNGMENT TRAINING: CPT | Mod: GO

## 2018-11-16 ENCOUNTER — HOSPITAL ENCOUNTER (OUTPATIENT)
Dept: OCCUPATIONAL THERAPY | Facility: CLINIC | Age: 53
Setting detail: THERAPIES SERIES
End: 2018-11-16
Attending: INTERNAL MEDICINE
Payer: COMMERCIAL

## 2018-11-16 ENCOUNTER — OFFICE VISIT - HEALTHEAST (OUTPATIENT)
Dept: SLEEP MEDICINE | Facility: CLINIC | Age: 53
End: 2018-11-16

## 2018-11-16 ENCOUNTER — TRANSFERRED RECORDS (OUTPATIENT)
Dept: FAMILY MEDICINE | Facility: CLINIC | Age: 53
End: 2018-11-16

## 2018-11-16 DIAGNOSIS — G47.33 OSA (OBSTRUCTIVE SLEEP APNEA): ICD-10-CM

## 2018-11-16 PROCEDURE — 97535 SELF CARE MNGMENT TRAINING: CPT | Mod: GO

## 2018-11-16 PROCEDURE — 40000445 ZZHC STATISTIC OT VISIT, LYMPHEDEMA

## 2018-11-16 ASSESSMENT — MIFFLIN-ST. JEOR: SCORE: 1984.76

## 2018-11-19 ENCOUNTER — HOSPITAL ENCOUNTER (OUTPATIENT)
Dept: CARDIAC REHAB | Facility: CLINIC | Age: 53
End: 2018-11-19
Attending: INTERNAL MEDICINE
Payer: COMMERCIAL

## 2018-11-19 PROCEDURE — 40000244 ZZH STATISTIC VISIT PULM REHAB

## 2018-11-19 PROCEDURE — G0239 OTH RESP PROC, GROUP: HCPCS

## 2018-11-20 ENCOUNTER — HOSPITAL ENCOUNTER (OUTPATIENT)
Dept: CARDIAC REHAB | Facility: CLINIC | Age: 53
End: 2018-11-20
Attending: INTERNAL MEDICINE
Payer: COMMERCIAL

## 2018-11-20 ENCOUNTER — HOSPITAL ENCOUNTER (OUTPATIENT)
Dept: OCCUPATIONAL THERAPY | Facility: CLINIC | Age: 53
Setting detail: THERAPIES SERIES
End: 2018-11-20
Attending: INTERNAL MEDICINE
Payer: COMMERCIAL

## 2018-11-20 PROCEDURE — G0239 OTH RESP PROC, GROUP: HCPCS

## 2018-11-20 PROCEDURE — 97140 MANUAL THERAPY 1/> REGIONS: CPT | Mod: GO

## 2018-11-20 PROCEDURE — 40000445 ZZHC STATISTIC OT VISIT, LYMPHEDEMA

## 2018-11-20 PROCEDURE — 40000244 ZZH STATISTIC VISIT PULM REHAB

## 2018-11-21 ENCOUNTER — HOSPITAL ENCOUNTER (OUTPATIENT)
Dept: OCCUPATIONAL THERAPY | Facility: CLINIC | Age: 53
Setting detail: THERAPIES SERIES
End: 2018-11-21
Attending: INTERNAL MEDICINE
Payer: COMMERCIAL

## 2018-11-21 ENCOUNTER — HOSPITAL ENCOUNTER (OUTPATIENT)
Dept: PHYSICAL THERAPY | Facility: CLINIC | Age: 53
Setting detail: THERAPIES SERIES
End: 2018-11-21
Attending: FAMILY MEDICINE
Payer: COMMERCIAL

## 2018-11-21 PROCEDURE — 97110 THERAPEUTIC EXERCISES: CPT | Mod: GP | Performed by: PHYSICAL THERAPIST

## 2018-11-21 PROCEDURE — 97535 SELF CARE MNGMENT TRAINING: CPT | Mod: GO

## 2018-11-21 PROCEDURE — 40000718 ZZHC STATISTIC PT DEPARTMENT ORTHO VISIT: Performed by: PHYSICAL THERAPIST

## 2018-11-21 PROCEDURE — 40000445 ZZHC STATISTIC OT VISIT, LYMPHEDEMA

## 2018-11-21 PROCEDURE — 97116 GAIT TRAINING THERAPY: CPT | Mod: GP | Performed by: PHYSICAL THERAPIST

## 2018-11-26 ENCOUNTER — HOSPITAL ENCOUNTER (OUTPATIENT)
Dept: OCCUPATIONAL THERAPY | Facility: CLINIC | Age: 53
Setting detail: THERAPIES SERIES
End: 2018-11-26
Attending: FAMILY MEDICINE
Payer: COMMERCIAL

## 2018-11-26 PROCEDURE — 40000125 ZZHC STATISTIC OT OUTPT VISIT: Performed by: OCCUPATIONAL THERAPIST

## 2018-11-26 PROCEDURE — 97140 MANUAL THERAPY 1/> REGIONS: CPT | Mod: GO | Performed by: OCCUPATIONAL THERAPIST

## 2018-11-27 ENCOUNTER — HOSPITAL ENCOUNTER (OUTPATIENT)
Dept: OCCUPATIONAL THERAPY | Facility: CLINIC | Age: 53
Setting detail: THERAPIES SERIES
End: 2018-11-27
Attending: INTERNAL MEDICINE
Payer: COMMERCIAL

## 2018-11-27 ENCOUNTER — HOSPITAL ENCOUNTER (OUTPATIENT)
Dept: CARDIAC REHAB | Facility: CLINIC | Age: 53
End: 2018-11-27
Attending: INTERNAL MEDICINE
Payer: COMMERCIAL

## 2018-11-27 PROCEDURE — G0239 OTH RESP PROC, GROUP: HCPCS

## 2018-11-27 PROCEDURE — 40000445 ZZHC STATISTIC OT VISIT, LYMPHEDEMA

## 2018-11-27 PROCEDURE — 40000244 ZZH STATISTIC VISIT PULM REHAB

## 2018-11-27 PROCEDURE — 97140 MANUAL THERAPY 1/> REGIONS: CPT | Mod: GO

## 2018-11-28 ENCOUNTER — COMMUNICATION - HEALTHEAST (OUTPATIENT)
Dept: PULMONOLOGY | Facility: OTHER | Age: 53
End: 2018-11-28

## 2018-11-28 ENCOUNTER — HOSPITAL ENCOUNTER (OUTPATIENT)
Dept: OCCUPATIONAL THERAPY | Facility: CLINIC | Age: 53
Setting detail: THERAPIES SERIES
End: 2018-11-28
Attending: INTERNAL MEDICINE
Payer: COMMERCIAL

## 2018-11-28 DIAGNOSIS — J96.01 ACUTE RESPIRATORY FAILURE WITH HYPOXIA AND HYPERCAPNIA (H): ICD-10-CM

## 2018-11-28 DIAGNOSIS — J96.02 ACUTE RESPIRATORY FAILURE WITH HYPOXIA AND HYPERCAPNIA (H): ICD-10-CM

## 2018-11-28 PROCEDURE — 97140 MANUAL THERAPY 1/> REGIONS: CPT | Mod: GO

## 2018-11-28 PROCEDURE — 40000445 ZZHC STATISTIC OT VISIT, LYMPHEDEMA

## 2018-11-29 ENCOUNTER — COMMUNICATION - HEALTHEAST (OUTPATIENT)
Dept: PULMONOLOGY | Facility: OTHER | Age: 53
End: 2018-11-29

## 2018-11-29 ENCOUNTER — AMBULATORY - HEALTHEAST (OUTPATIENT)
Dept: PULMONOLOGY | Facility: OTHER | Age: 53
End: 2018-11-29

## 2018-11-29 DIAGNOSIS — R09.02 HYPOXIA: ICD-10-CM

## 2018-11-30 ENCOUNTER — HOSPITAL ENCOUNTER (OUTPATIENT)
Dept: OCCUPATIONAL THERAPY | Facility: CLINIC | Age: 53
Setting detail: THERAPIES SERIES
End: 2018-11-30
Attending: INTERNAL MEDICINE
Payer: COMMERCIAL

## 2018-11-30 ENCOUNTER — HOSPITAL ENCOUNTER (OUTPATIENT)
Dept: RADIOLOGY | Facility: HOSPITAL | Age: 53
Discharge: HOME | End: 2018-11-30
Attending: INTERNAL MEDICINE

## 2018-11-30 ENCOUNTER — OFFICE VISIT - HEALTHEAST (OUTPATIENT)
Dept: PULMONOLOGY | Facility: OTHER | Age: 53
End: 2018-11-30

## 2018-11-30 DIAGNOSIS — R09.02 HYPOXIA: ICD-10-CM

## 2018-11-30 DIAGNOSIS — J96.01 ACUTE RESPIRATORY FAILURE WITH HYPOXIA AND HYPERCAPNIA (H): ICD-10-CM

## 2018-11-30 DIAGNOSIS — J96.02 ACUTE RESPIRATORY FAILURE WITH HYPOXIA AND HYPERCAPNIA (H): ICD-10-CM

## 2018-11-30 LAB
ANION GAP SERPL CALCULATED.3IONS-SCNC: 13 MMOL/L (ref 5–18)
BNP SERPL-MCNC: <10 PG/ML (ref 0–82)
BUN SERPL-MCNC: 14 MG/DL (ref 8–22)
CALCIUM SERPL-MCNC: 9.5 MG/DL (ref 8.5–10.5)
CHLORIDE BLD-SCNC: 96 MMOL/L (ref 98–107)
CO2 SERPL-SCNC: 31 MMOL/L (ref 22–31)
CREAT SERPL-MCNC: 0.79 MG/DL (ref 0.6–1.1)
GFR SERPL CREATININE-BSD FRML MDRD: >60 ML/MIN/1.73M2
GLUCOSE BLD-MCNC: 90 MG/DL (ref 70–125)
POTASSIUM BLD-SCNC: 4.4 MMOL/L (ref 3.5–5)
SODIUM SERPL-SCNC: 140 MMOL/L (ref 136–145)

## 2018-11-30 PROCEDURE — 97140 MANUAL THERAPY 1/> REGIONS: CPT | Mod: GO

## 2018-11-30 PROCEDURE — 40000445 ZZHC STATISTIC OT VISIT, LYMPHEDEMA

## 2018-11-30 NOTE — ADDENDUM NOTE
Encounter addended by: Leoncio Lucio OT on: 11/30/2018  4:15 PM<BR>     Actions taken: Flowsheet accepted

## 2018-12-01 ENCOUNTER — COMMUNICATION - HEALTHEAST (OUTPATIENT)
Dept: PULMONOLOGY | Facility: OTHER | Age: 53
End: 2018-12-01

## 2018-12-03 ENCOUNTER — HOSPITAL ENCOUNTER (OUTPATIENT)
Dept: OCCUPATIONAL THERAPY | Facility: CLINIC | Age: 53
Setting detail: THERAPIES SERIES
End: 2018-12-03
Attending: FAMILY MEDICINE
Payer: COMMERCIAL

## 2018-12-03 PROCEDURE — 97140 MANUAL THERAPY 1/> REGIONS: CPT | Mod: GO | Performed by: OCCUPATIONAL THERAPIST

## 2018-12-03 PROCEDURE — 40000125 ZZHC STATISTIC OT OUTPT VISIT: Performed by: OCCUPATIONAL THERAPIST

## 2018-12-04 ENCOUNTER — HOSPITAL ENCOUNTER (OUTPATIENT)
Dept: CARDIAC REHAB | Facility: CLINIC | Age: 53
End: 2018-12-04
Attending: INTERNAL MEDICINE
Payer: COMMERCIAL

## 2018-12-04 PROCEDURE — G0239 OTH RESP PROC, GROUP: HCPCS

## 2018-12-04 PROCEDURE — 40000244 ZZH STATISTIC VISIT PULM REHAB

## 2018-12-05 ENCOUNTER — COMMUNICATION - HEALTHEAST (OUTPATIENT)
Dept: PULMONOLOGY | Facility: OTHER | Age: 53
End: 2018-12-05

## 2018-12-05 ENCOUNTER — AMBULATORY - HEALTHEAST (OUTPATIENT)
Dept: PULMONOLOGY | Facility: OTHER | Age: 53
End: 2018-12-05

## 2018-12-05 ENCOUNTER — HOSPITAL ENCOUNTER (OUTPATIENT)
Dept: OCCUPATIONAL THERAPY | Facility: CLINIC | Age: 53
Setting detail: THERAPIES SERIES
End: 2018-12-05
Attending: FAMILY MEDICINE
Payer: COMMERCIAL

## 2018-12-05 ENCOUNTER — HOSPITAL ENCOUNTER (OUTPATIENT)
Dept: PHYSICAL THERAPY | Facility: CLINIC | Age: 53
Setting detail: THERAPIES SERIES
End: 2018-12-05
Attending: FAMILY MEDICINE
Payer: COMMERCIAL

## 2018-12-05 ENCOUNTER — HOSPITAL ENCOUNTER (OUTPATIENT)
Dept: OCCUPATIONAL THERAPY | Facility: CLINIC | Age: 53
Setting detail: THERAPIES SERIES
End: 2018-12-05
Attending: INTERNAL MEDICINE
Payer: COMMERCIAL

## 2018-12-05 DIAGNOSIS — I89.0 LYMPHEDEMA: Primary | ICD-10-CM

## 2018-12-05 DIAGNOSIS — I50.30 HEART FAILURE WITH PRESERVED EJECTION FRACTION, NYHA CLASS I (H): ICD-10-CM

## 2018-12-05 PROCEDURE — 97110 THERAPEUTIC EXERCISES: CPT | Mod: GP | Performed by: PHYSICAL THERAPIST

## 2018-12-05 PROCEDURE — 97535 SELF CARE MNGMENT TRAINING: CPT | Mod: GO | Performed by: OCCUPATIONAL THERAPIST

## 2018-12-05 PROCEDURE — 40000718 ZZHC STATISTIC PT DEPARTMENT ORTHO VISIT: Performed by: PHYSICAL THERAPIST

## 2018-12-05 PROCEDURE — 97112 NEUROMUSCULAR REEDUCATION: CPT | Mod: GP | Performed by: PHYSICAL THERAPIST

## 2018-12-05 PROCEDURE — 97535 SELF CARE MNGMENT TRAINING: CPT | Mod: GO

## 2018-12-05 PROCEDURE — 40000445 ZZHC STATISTIC OT VISIT, LYMPHEDEMA

## 2018-12-05 PROCEDURE — 40000125 ZZHC STATISTIC OT OUTPT VISIT: Performed by: OCCUPATIONAL THERAPIST

## 2018-12-05 PROCEDURE — 97116 GAIT TRAINING THERAPY: CPT | Mod: GP | Performed by: PHYSICAL THERAPIST

## 2018-12-06 ENCOUNTER — HOSPITAL ENCOUNTER (OUTPATIENT)
Dept: OCCUPATIONAL THERAPY | Facility: CLINIC | Age: 53
Setting detail: THERAPIES SERIES
End: 2018-12-06
Attending: INTERNAL MEDICINE
Payer: COMMERCIAL

## 2018-12-06 ENCOUNTER — HOSPITAL ENCOUNTER (OUTPATIENT)
Dept: CARDIAC REHAB | Facility: CLINIC | Age: 53
End: 2018-12-06
Attending: INTERNAL MEDICINE
Payer: COMMERCIAL

## 2018-12-06 PROCEDURE — 40000244 ZZH STATISTIC VISIT PULM REHAB

## 2018-12-06 PROCEDURE — 40000445 ZZHC STATISTIC OT VISIT, LYMPHEDEMA

## 2018-12-06 PROCEDURE — 97140 MANUAL THERAPY 1/> REGIONS: CPT | Mod: GO

## 2018-12-06 PROCEDURE — G0239 OTH RESP PROC, GROUP: HCPCS

## 2018-12-07 ENCOUNTER — HOSPITAL ENCOUNTER (OUTPATIENT)
Dept: OCCUPATIONAL THERAPY | Facility: CLINIC | Age: 53
Setting detail: THERAPIES SERIES
End: 2018-12-07
Attending: INTERNAL MEDICINE
Payer: COMMERCIAL

## 2018-12-07 PROCEDURE — 97140 MANUAL THERAPY 1/> REGIONS: CPT | Mod: GO

## 2018-12-07 PROCEDURE — 40000445 ZZHC STATISTIC OT VISIT, LYMPHEDEMA

## 2018-12-10 ENCOUNTER — AMBULATORY - HEALTHEAST (OUTPATIENT)
Dept: LAB | Facility: CLINIC | Age: 53
End: 2018-12-10

## 2018-12-10 ENCOUNTER — HOSPITAL ENCOUNTER (OUTPATIENT)
Dept: CARDIAC REHAB | Facility: CLINIC | Age: 53
End: 2018-12-10
Attending: INTERNAL MEDICINE
Payer: COMMERCIAL

## 2018-12-10 ENCOUNTER — HOSPITAL ENCOUNTER (OUTPATIENT)
Dept: OCCUPATIONAL THERAPY | Facility: CLINIC | Age: 53
Setting detail: THERAPIES SERIES
End: 2018-12-10
Attending: FAMILY MEDICINE
Payer: COMMERCIAL

## 2018-12-10 DIAGNOSIS — I50.30 HEART FAILURE WITH PRESERVED EJECTION FRACTION, NYHA CLASS I (H): ICD-10-CM

## 2018-12-10 LAB
ANION GAP SERPL CALCULATED.3IONS-SCNC: 10 MMOL/L (ref 5–18)
BUN SERPL-MCNC: 19 MG/DL (ref 8–22)
CALCIUM SERPL-MCNC: 9.7 MG/DL (ref 8.5–10.5)
CHLORIDE BLD-SCNC: 98 MMOL/L (ref 98–107)
CO2 SERPL-SCNC: 32 MMOL/L (ref 22–31)
CREAT SERPL-MCNC: 0.82 MG/DL (ref 0.6–1.1)
GFR SERPL CREATININE-BSD FRML MDRD: >60 ML/MIN/1.73M2
GLUCOSE BLD-MCNC: 101 MG/DL (ref 70–125)
POTASSIUM BLD-SCNC: 4.1 MMOL/L (ref 3.5–5)
SODIUM SERPL-SCNC: 140 MMOL/L (ref 136–145)

## 2018-12-10 PROCEDURE — 97535 SELF CARE MNGMENT TRAINING: CPT | Mod: GO | Performed by: OCCUPATIONAL THERAPIST

## 2018-12-10 PROCEDURE — 40000244 ZZH STATISTIC VISIT PULM REHAB

## 2018-12-10 PROCEDURE — G0239 OTH RESP PROC, GROUP: HCPCS

## 2018-12-11 ENCOUNTER — COMMUNICATION - HEALTHEAST (OUTPATIENT)
Dept: PULMONOLOGY | Facility: OTHER | Age: 53
End: 2018-12-11

## 2018-12-11 ENCOUNTER — HOSPITAL ENCOUNTER (OUTPATIENT)
Dept: OCCUPATIONAL THERAPY | Facility: CLINIC | Age: 53
Setting detail: THERAPIES SERIES
End: 2018-12-11
Attending: INTERNAL MEDICINE
Payer: COMMERCIAL

## 2018-12-11 PROCEDURE — 97535 SELF CARE MNGMENT TRAINING: CPT | Mod: GO

## 2018-12-12 ENCOUNTER — AMBULATORY - HEALTHEAST (OUTPATIENT)
Dept: PULMONOLOGY | Facility: OTHER | Age: 53
End: 2018-12-12

## 2018-12-12 ENCOUNTER — HOSPITAL ENCOUNTER (OUTPATIENT)
Dept: OCCUPATIONAL THERAPY | Facility: CLINIC | Age: 53
Setting detail: THERAPIES SERIES
End: 2018-12-12
Attending: FAMILY MEDICINE
Payer: COMMERCIAL

## 2018-12-12 ENCOUNTER — HOSPITAL ENCOUNTER (OUTPATIENT)
Dept: PHYSICAL THERAPY | Facility: CLINIC | Age: 53
Setting detail: THERAPIES SERIES
End: 2018-12-12
Attending: FAMILY MEDICINE
Payer: COMMERCIAL

## 2018-12-12 DIAGNOSIS — J96.02 ACUTE RESPIRATORY FAILURE WITH HYPOXIA AND HYPERCAPNIA (H): ICD-10-CM

## 2018-12-12 DIAGNOSIS — J96.01 ACUTE RESPIRATORY FAILURE WITH HYPOXIA AND HYPERCAPNIA (H): ICD-10-CM

## 2018-12-12 PROCEDURE — 97140 MANUAL THERAPY 1/> REGIONS: CPT | Mod: GO | Performed by: OCCUPATIONAL THERAPIST

## 2018-12-12 PROCEDURE — 97110 THERAPEUTIC EXERCISES: CPT | Mod: GP | Performed by: PHYSICAL THERAPIST

## 2018-12-12 PROCEDURE — 97116 GAIT TRAINING THERAPY: CPT | Mod: GP | Performed by: PHYSICAL THERAPIST

## 2018-12-12 PROCEDURE — 97140 MANUAL THERAPY 1/> REGIONS: CPT | Mod: GP | Performed by: PHYSICAL THERAPIST

## 2018-12-14 ENCOUNTER — COMMUNICATION - HEALTHEAST (OUTPATIENT)
Dept: PULMONOLOGY | Facility: OTHER | Age: 53
End: 2018-12-14

## 2018-12-14 ENCOUNTER — HOSPITAL ENCOUNTER (OUTPATIENT)
Dept: OCCUPATIONAL THERAPY | Facility: CLINIC | Age: 53
Setting detail: THERAPIES SERIES
End: 2018-12-14
Attending: INTERNAL MEDICINE
Payer: COMMERCIAL

## 2018-12-14 PROCEDURE — 97140 MANUAL THERAPY 1/> REGIONS: CPT | Mod: GO

## 2018-12-17 ENCOUNTER — HOSPITAL ENCOUNTER (OUTPATIENT)
Dept: CARDIAC REHAB | Facility: CLINIC | Age: 53
End: 2018-12-17
Attending: INTERNAL MEDICINE
Payer: COMMERCIAL

## 2018-12-17 PROCEDURE — G0237 THERAPEUTIC PROCD STRG ENDUR: HCPCS

## 2018-12-17 PROCEDURE — 40000244 ZZH STATISTIC VISIT PULM REHAB

## 2018-12-17 PROCEDURE — G0238 OTH RESP PROC, INDIV: HCPCS

## 2018-12-21 ENCOUNTER — HOSPITAL ENCOUNTER (OUTPATIENT)
Dept: PHYSICAL THERAPY | Facility: CLINIC | Age: 53
Setting detail: THERAPIES SERIES
End: 2018-12-21
Attending: FAMILY MEDICINE
Payer: COMMERCIAL

## 2018-12-21 PROCEDURE — 97116 GAIT TRAINING THERAPY: CPT | Mod: GP | Performed by: PHYSICAL THERAPIST

## 2018-12-21 PROCEDURE — 97112 NEUROMUSCULAR REEDUCATION: CPT | Mod: GP | Performed by: PHYSICAL THERAPIST

## 2018-12-21 PROCEDURE — 97140 MANUAL THERAPY 1/> REGIONS: CPT | Mod: GP | Performed by: PHYSICAL THERAPIST

## 2018-12-21 NOTE — PROGRESS NOTES
"   Progress note 12/21/18 1307   Signing Clinician's Name / Credentials   Signing clinician's name / credentials Windy Buckley, PT   Session Number   Session Number 23   Goal 1   Goal Description Patient will perform the TUG in 13.5\" or less with least restrictive AD indicating reduced fall risk for greater safety negotiating home and community environments.  (goal in progress, date extended)   Target Date 01/18/19   Goal Identifier 1   Goal 2   Goal Description Patient will be able to perform at least 11 sit to stands from 18\" surface without UE support in 30\" indicating reduced fall risk and greater ease, safety moving about her home and work environment.  (goal met 12/21/18)   Target Date 11/15/18   Goal Identifier 2   Goal 3   Goal Description Patient will have sufficient LE strength and stability to be able to climb stairs in recip gait pattern with 1 railing for greater ease of negotiating her multiple level home.  (in progress, date extended)   Target Date 02/15/19   Goal Identifier 3   Goal 4   Goal Description Patient will have improved standing tolerance and balance to be able to stand without UE support x 30 min to easily prepare meals for her family or complete her morning ADLs  without the need for adaptive equipment ie shower chair   (standing tolerance 15 mins, still sitting for shower, cont)   Target Date 02/15/19   Goal Identifier 4   Subjective Report   Subjective Report Elyssa continues to struggle with SOB and dropping O2sats. Her portable O2 tank should be delivered today.     Objective Measures   Objective Measures Objective Measure 1;Objective Measure 2;Objective Measure 3;Objective Measure 4;Objective Measure 5;Objective Measure 6   Objective Measure 1   Objective Measure 30'' sit <> stand   Details 12 reps from 18'' chair- WNL   Objective Measure 2   Objective Measure TUG   Details 16.59\" without AD (24.23\" with walker at eval). <10 seconds is normal.   Objective Measure 3   Objective " "Measure Functional mobility   Details Elyssa is now walking short, household distances, without an AD. As the day goes on and she is more tired, she often feels less steady and will use her cane as needed. She is using her cane in the community most of the time, limited to short community distances. She uses her walker for longer distances. She is climbing stairs at home with greater ease but is still challenged by weakness and SOB. She requires B UE support on stairs and relies quite a bit on her arms. She is able to negotiate 4\" steps recip in the clinic but with higher, 6+\" steps ie at home, she walks in step to pattern. As ankle mobility improves, she is having less deviation on stairs, able to come down in forward fashion without twisting. Elyssa is having an easier time getting off surfaces around her home. Low surfaces in the community can be harder but she is independent with UE support most of the time.    Objective Measure 4   Objective Measure ADLs   Details Elyssa is indep in ALDs. She still sits for a shower but is able to step in/out over the tub with UE support. She is standing longer to prepare meals or for grooming, etc. up to 15 mins at a time. She takes rest breaks as needed during these tasks for energy conservation.   Vitals Signs   Vital Signs Comments O2 sats dropping into mid to upper 80's with activity today, recovers with rest to 90 after a minute or 2.   Treatment Interventions   Interventions Manual Therapy;Gait Training;Neuromuscular Re-education   Therapeutic Procedure/exercise   Therapeutic Procedures: strength, endurance, ROM, flexibillity minutes (92414) 2   Skilled Intervention cues on technqiue   Patient Response tolerated well   Treatment Detail standing gastroc and soleus stretches after MWM x 30\" each on L.    Progress stretches to improve ankle flexibility for greater ease with mobility on stairs and with gait.   Neuromuscular Re-education   Skilled Intervention CGA. monitoring O2 " "sat   Patient Response no LOB. stiffness felt in spine with trunk mobility during trunk rotation/arm lift activities. decreased control with tap-ups, more heavy footed. short rest breaks prn with SOB and decreased O2 sat   Treatment Detail standing in normal CRAIG holding 1kg med ball out in front at chest level, performing B trunk rotation x 10B with cues to follow ball with head/eys for full rotation, lifting ball overhead following ball with head/eys x 9 for spinal extension and in PNF chops/lifts with ball x 10 B. standing on airex mat with feet in normal CRAIG with EO, EC, EO and EC with head turns. stance stability with stance leg on airex mat and opposite on step in front progressed to tap-ups from cushion to step x 8-10 B, no UE support and cues to look straight ahead vs at step. repeat sit to stand without UE support x12.   Neuromuscular re-ed of mvmt, balance, coord, kinesthetic sense, posture, proprioception minutes (34141) 21   Gait Training   Gait Training Minutes, includes stair climbing (96118) 18   Skilled Intervention TUG without AD. cues, encouragement, SBA, monitoring O2sat   Patient Response improved on stairs with less pain. more SOB with stair climbing, short rest in standing after each pass up or down.   Treatment Detail up/down 4 \" steps (5 steps) recip going up each time, coming down step to leading L x 1, leading R to use newly L ankle DF ROM x 2 then recip down x 1. up/down 6\" step x 1 recip up and step to down alternating lead leg. B UE support with all stair climbing.    Progress TUG goal in progress, approp to continue. stair goal in progress, will continue working toward this goal as well.   Manual Therapy   Manual Therapy: Mobilization, MFR, MLD, friction massage minutes (20554) 10   Skilled Intervention MWM L ankle AP at subtalar joint with AROM in/out DF standing with lunge 3x10, mobilization belt pulling PA direction above joint.   Patient Response discomfort during MWM but tolerable, " minimal pinch/discomfort anterior ankle after MWM   Progress ankle mobs to improved ROM and ability to DF down stairs   Education   Learner Patient;Family   Readiness Eager   Method Explanation   Response Verbalizes Understanding   Education Comments POC   Plan   Updates to plan of care Patient is making slow but steady progress with strength, balance and functional mobility. Progress is limited by significantly deconditioned status and pulmonary challenges ie dropping O2 sats and SOB with activity. She will benefit from further skilled PT intervention for safe and effective care, progressing towards PLOF. She will continue on a 1x/week frequency until pulmonary rehab is over and then will increase to 2x/week. Treatment anticipated to continue thru March.   Plan for next session ankle MWM. progression of strengthening, spinal mobility, gait training, balance training   Total Session Time   Timed Code Treatment Minutes 51   Total Treatment Time (sum of timed and untimed services) 51

## 2018-12-26 ENCOUNTER — RECORDS - HEALTHEAST (OUTPATIENT)
Dept: ADMINISTRATIVE | Facility: OTHER | Age: 53
End: 2018-12-26

## 2018-12-26 ENCOUNTER — AMBULATORY - HEALTHEAST (OUTPATIENT)
Dept: PULMONOLOGY | Facility: OTHER | Age: 53
End: 2018-12-26

## 2019-01-03 ENCOUNTER — HOSPITAL ENCOUNTER (OUTPATIENT)
Dept: CARDIAC REHAB | Facility: CLINIC | Age: 54
End: 2019-01-03
Attending: INTERNAL MEDICINE
Payer: COMMERCIAL

## 2019-01-03 PROCEDURE — G0239 OTH RESP PROC, GROUP: HCPCS

## 2019-01-03 PROCEDURE — 40000244 ZZH STATISTIC VISIT PULM REHAB

## 2019-01-04 ENCOUNTER — RECORDS - HEALTHEAST (OUTPATIENT)
Dept: ADMINISTRATIVE | Facility: OTHER | Age: 54
End: 2019-01-04

## 2019-01-07 ENCOUNTER — HOSPITAL ENCOUNTER (OUTPATIENT)
Dept: OCCUPATIONAL THERAPY | Facility: CLINIC | Age: 54
Setting detail: THERAPIES SERIES
End: 2019-01-07
Attending: FAMILY MEDICINE
Payer: COMMERCIAL

## 2019-01-07 PROCEDURE — 97140 MANUAL THERAPY 1/> REGIONS: CPT | Mod: GO | Performed by: OCCUPATIONAL THERAPIST

## 2019-01-08 ENCOUNTER — HOSPITAL ENCOUNTER (OUTPATIENT)
Dept: CARDIAC REHAB | Facility: CLINIC | Age: 54
End: 2019-01-08
Attending: INTERNAL MEDICINE
Payer: COMMERCIAL

## 2019-01-08 PROCEDURE — 40000244 ZZH STATISTIC VISIT PULM REHAB

## 2019-01-08 PROCEDURE — G0239 OTH RESP PROC, GROUP: HCPCS

## 2019-01-09 ENCOUNTER — HOSPITAL ENCOUNTER (OUTPATIENT)
Dept: OCCUPATIONAL THERAPY | Facility: CLINIC | Age: 54
Setting detail: THERAPIES SERIES
End: 2019-01-09
Attending: FAMILY MEDICINE
Payer: COMMERCIAL

## 2019-01-09 ENCOUNTER — HOSPITAL ENCOUNTER (OUTPATIENT)
Dept: CARDIAC REHAB | Facility: CLINIC | Age: 54
End: 2019-01-09
Attending: INTERNAL MEDICINE
Payer: COMMERCIAL

## 2019-01-09 ENCOUNTER — HOSPITAL ENCOUNTER (OUTPATIENT)
Dept: PHYSICAL THERAPY | Facility: CLINIC | Age: 54
Setting detail: THERAPIES SERIES
End: 2019-01-09
Attending: FAMILY MEDICINE
Payer: COMMERCIAL

## 2019-01-09 PROCEDURE — 97110 THERAPEUTIC EXERCISES: CPT | Mod: GP | Performed by: PHYSICAL THERAPIST

## 2019-01-09 PROCEDURE — 97140 MANUAL THERAPY 1/> REGIONS: CPT | Mod: GO | Performed by: OCCUPATIONAL THERAPIST
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
## 2019-01-10 ENCOUNTER — HOSPITAL ENCOUNTER (OUTPATIENT)
Dept: CARDIAC REHAB | Facility: CLINIC | Age: 54
End: 2019-01-10
Attending: INTERNAL MEDICINE
Payer: COMMERCIAL

## 2019-01-10 PROCEDURE — 40000244 ZZH STATISTIC VISIT PULM REHAB

## 2019-01-10 PROCEDURE — G0239 OTH RESP PROC, GROUP: HCPCS

## 2019-01-14 ENCOUNTER — HOSPITAL ENCOUNTER (OUTPATIENT)
Dept: CARDIAC REHAB | Facility: CLINIC | Age: 54
End: 2019-01-14
Attending: INTERNAL MEDICINE
Payer: COMMERCIAL

## 2019-01-14 ENCOUNTER — HOSPITAL ENCOUNTER (OUTPATIENT)
Dept: OCCUPATIONAL THERAPY | Facility: CLINIC | Age: 54
Setting detail: THERAPIES SERIES
End: 2019-01-14
Attending: FAMILY MEDICINE
Payer: COMMERCIAL

## 2019-01-14 ENCOUNTER — HOSPITAL ENCOUNTER (OUTPATIENT)
Dept: PHYSICAL THERAPY | Facility: CLINIC | Age: 54
Setting detail: THERAPIES SERIES
End: 2019-01-14
Attending: FAMILY MEDICINE
Payer: COMMERCIAL

## 2019-01-14 PROCEDURE — G0239 OTH RESP PROC, GROUP: HCPCS

## 2019-01-14 PROCEDURE — 97140 MANUAL THERAPY 1/> REGIONS: CPT | Mod: GO | Performed by: OCCUPATIONAL THERAPIST

## 2019-01-14 PROCEDURE — G0424 PULMONARY REHAB W EXER: HCPCS

## 2019-01-14 PROCEDURE — 97110 THERAPEUTIC EXERCISES: CPT | Mod: GP | Performed by: PHYSICAL THERAPIST

## 2019-01-14 PROCEDURE — 40000244 ZZH STATISTIC VISIT PULM REHAB
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
## 2019-01-16 ENCOUNTER — HOSPITAL ENCOUNTER (OUTPATIENT)
Dept: OCCUPATIONAL THERAPY | Facility: CLINIC | Age: 54
Setting detail: THERAPIES SERIES
End: 2019-01-16
Attending: FAMILY MEDICINE
Payer: COMMERCIAL

## 2019-01-16 ENCOUNTER — HOSPITAL ENCOUNTER (OUTPATIENT)
Dept: PHYSICAL THERAPY | Facility: CLINIC | Age: 54
Setting detail: THERAPIES SERIES
End: 2019-01-16
Attending: FAMILY MEDICINE
Payer: COMMERCIAL

## 2019-01-16 PROCEDURE — 97140 MANUAL THERAPY 1/> REGIONS: CPT | Mod: GP | Performed by: PHYSICAL THERAPIST

## 2019-01-16 PROCEDURE — 97116 GAIT TRAINING THERAPY: CPT | Mod: GP | Performed by: PHYSICAL THERAPIST

## 2019-01-16 PROCEDURE — 97112 NEUROMUSCULAR REEDUCATION: CPT | Mod: GP | Performed by: PHYSICAL THERAPIST

## 2019-01-16 PROCEDURE — 97110 THERAPEUTIC EXERCISES: CPT | Mod: GP | Performed by: PHYSICAL THERAPIST

## 2019-01-16 PROCEDURE — 97140 MANUAL THERAPY 1/> REGIONS: CPT | Mod: GO | Performed by: OCCUPATIONAL THERAPIST

## 2019-01-17 ENCOUNTER — HOSPITAL ENCOUNTER (OUTPATIENT)
Dept: CARDIAC REHAB | Facility: CLINIC | Age: 54
End: 2019-01-17
Attending: INTERNAL MEDICINE
Payer: COMMERCIAL

## 2019-01-17 PROCEDURE — G0239 OTH RESP PROC, GROUP: HCPCS

## 2019-01-17 PROCEDURE — 40000244 ZZH STATISTIC VISIT PULM REHAB

## 2019-01-21 ENCOUNTER — HOSPITAL ENCOUNTER (OUTPATIENT)
Dept: PHYSICAL THERAPY | Facility: CLINIC | Age: 54
Setting detail: THERAPIES SERIES
End: 2019-01-21
Attending: FAMILY MEDICINE
Payer: COMMERCIAL

## 2019-01-21 ENCOUNTER — HOSPITAL ENCOUNTER (OUTPATIENT)
Dept: OCCUPATIONAL THERAPY | Facility: CLINIC | Age: 54
Setting detail: THERAPIES SERIES
End: 2019-01-21
Attending: FAMILY MEDICINE
Payer: COMMERCIAL

## 2019-01-21 PROCEDURE — 97140 MANUAL THERAPY 1/> REGIONS: CPT | Mod: GO | Performed by: OCCUPATIONAL THERAPIST

## 2019-01-21 PROCEDURE — 97110 THERAPEUTIC EXERCISES: CPT | Mod: GP | Performed by: PHYSICAL THERAPIST

## 2019-01-23 ENCOUNTER — HOSPITAL ENCOUNTER (OUTPATIENT)
Dept: PHYSICAL THERAPY | Facility: CLINIC | Age: 54
Setting detail: THERAPIES SERIES
End: 2019-01-23
Attending: FAMILY MEDICINE
Payer: COMMERCIAL

## 2019-01-23 ENCOUNTER — HOSPITAL ENCOUNTER (OUTPATIENT)
Dept: OCCUPATIONAL THERAPY | Facility: CLINIC | Age: 54
Setting detail: THERAPIES SERIES
End: 2019-01-23
Attending: FAMILY MEDICINE
Payer: COMMERCIAL

## 2019-01-23 DIAGNOSIS — K21.9 GASTROESOPHAGEAL REFLUX DISEASE WITHOUT ESOPHAGITIS: ICD-10-CM

## 2019-01-23 DIAGNOSIS — D50.0 IRON DEFICIENCY ANEMIA DUE TO CHRONIC BLOOD LOSS: ICD-10-CM

## 2019-01-23 PROCEDURE — 97140 MANUAL THERAPY 1/> REGIONS: CPT | Mod: GO | Performed by: OCCUPATIONAL THERAPIST

## 2019-01-23 PROCEDURE — 97112 NEUROMUSCULAR REEDUCATION: CPT | Mod: GP | Performed by: PHYSICAL THERAPIST

## 2019-01-23 PROCEDURE — 97535 SELF CARE MNGMENT TRAINING: CPT | Mod: GO | Performed by: OCCUPATIONAL THERAPIST

## 2019-01-23 PROCEDURE — 97110 THERAPEUTIC EXERCISES: CPT | Mod: GP | Performed by: PHYSICAL THERAPIST

## 2019-01-23 NOTE — TELEPHONE ENCOUNTER
Mirta Decker is requesting a refill of:    Pending Prescriptions:                       Disp   Refills    Multiple Vitamins-Minerals (CENTROVITE) T*90 tab*0            Sig: TAKE 1 TABLET BY MOUTH ONE TIME DAILY     Please close encounter if RX was sent. Thanks, Barbara

## 2019-01-24 ENCOUNTER — TRANSFERRED RECORDS (OUTPATIENT)
Dept: FAMILY MEDICINE | Facility: CLINIC | Age: 54
End: 2019-01-24

## 2019-01-24 ENCOUNTER — OFFICE VISIT - HEALTHEAST (OUTPATIENT)
Dept: PULMONOLOGY | Facility: OTHER | Age: 54
End: 2019-01-24

## 2019-01-24 ENCOUNTER — HOSPITAL ENCOUNTER (OUTPATIENT)
Dept: CARDIAC REHAB | Facility: CLINIC | Age: 54
End: 2019-01-24
Attending: INTERNAL MEDICINE
Payer: COMMERCIAL

## 2019-01-24 DIAGNOSIS — I50.30 (HFPEF) HEART FAILURE WITH PRESERVED EJECTION FRACTION (H): ICD-10-CM

## 2019-01-24 DIAGNOSIS — R09.02 HYPOXIA: ICD-10-CM

## 2019-01-24 DIAGNOSIS — J96.01 ACUTE RESPIRATORY FAILURE WITH HYPOXIA AND HYPERCAPNIA (H): ICD-10-CM

## 2019-01-24 DIAGNOSIS — J96.02 ACUTE RESPIRATORY FAILURE WITH HYPOXIA AND HYPERCAPNIA (H): ICD-10-CM

## 2019-01-24 DIAGNOSIS — J45.21 MILD INTERMITTENT ASTHMA WITH EXACERBATION: ICD-10-CM

## 2019-01-24 DIAGNOSIS — J96.12 CHRONIC HYPERCAPNIC RESPIRATORY FAILURE (H): ICD-10-CM

## 2019-01-24 PROCEDURE — G0239 OTH RESP PROC, GROUP: HCPCS

## 2019-01-24 PROCEDURE — 40000244 ZZH STATISTIC VISIT PULM REHAB

## 2019-01-24 RX ORDER — MULTIVITAMIN/IRON/FOLIC ACID 18MG-0.4MG
TABLET ORAL
Qty: 90 TABLET | Refills: 0 | Status: SHIPPED | OUTPATIENT
Start: 2019-01-24 | End: 2019-05-02

## 2019-02-05 ENCOUNTER — TRANSFERRED RECORDS (OUTPATIENT)
Dept: FAMILY MEDICINE | Facility: CLINIC | Age: 54
End: 2019-02-05

## 2019-02-05 ENCOUNTER — AMBULATORY - HEALTHEAST (OUTPATIENT)
Dept: PULMONOLOGY | Facility: OTHER | Age: 54
End: 2019-02-05

## 2019-02-11 ENCOUNTER — HOSPITAL ENCOUNTER (OUTPATIENT)
Dept: OCCUPATIONAL THERAPY | Facility: CLINIC | Age: 54
Setting detail: THERAPIES SERIES
End: 2019-02-11
Attending: FAMILY MEDICINE
Payer: COMMERCIAL

## 2019-02-11 ENCOUNTER — HOSPITAL ENCOUNTER (OUTPATIENT)
Dept: PHYSICAL THERAPY | Facility: CLINIC | Age: 54
Setting detail: THERAPIES SERIES
End: 2019-02-11
Attending: FAMILY MEDICINE
Payer: COMMERCIAL

## 2019-02-11 PROCEDURE — 97110 THERAPEUTIC EXERCISES: CPT | Mod: GP | Performed by: PHYSICAL THERAPIST

## 2019-02-11 PROCEDURE — 97140 MANUAL THERAPY 1/> REGIONS: CPT | Mod: GO | Performed by: OCCUPATIONAL THERAPIST

## 2019-02-13 ENCOUNTER — HOSPITAL ENCOUNTER (OUTPATIENT)
Dept: OCCUPATIONAL THERAPY | Facility: CLINIC | Age: 54
Setting detail: THERAPIES SERIES
End: 2019-02-13
Attending: FAMILY MEDICINE
Payer: COMMERCIAL

## 2019-02-13 ENCOUNTER — HOSPITAL ENCOUNTER (OUTPATIENT)
Dept: PHYSICAL THERAPY | Facility: CLINIC | Age: 54
Setting detail: THERAPIES SERIES
End: 2019-02-13
Attending: FAMILY MEDICINE
Payer: COMMERCIAL

## 2019-02-13 PROCEDURE — 97110 THERAPEUTIC EXERCISES: CPT | Mod: GP | Performed by: PHYSICAL THERAPIST

## 2019-02-13 PROCEDURE — 97140 MANUAL THERAPY 1/> REGIONS: CPT | Mod: GO | Performed by: OCCUPATIONAL THERAPIST

## 2019-02-18 ENCOUNTER — HOSPITAL ENCOUNTER (OUTPATIENT)
Dept: OCCUPATIONAL THERAPY | Facility: CLINIC | Age: 54
Setting detail: THERAPIES SERIES
End: 2019-02-18
Attending: FAMILY MEDICINE
Payer: COMMERCIAL

## 2019-02-18 ENCOUNTER — HOSPITAL ENCOUNTER (OUTPATIENT)
Dept: PHYSICAL THERAPY | Facility: CLINIC | Age: 54
Setting detail: THERAPIES SERIES
End: 2019-02-18
Attending: FAMILY MEDICINE
Payer: COMMERCIAL

## 2019-02-18 DIAGNOSIS — K59.03 DRUG-INDUCED CONSTIPATION: ICD-10-CM

## 2019-02-18 DIAGNOSIS — D50.0 IRON DEFICIENCY ANEMIA DUE TO CHRONIC BLOOD LOSS: ICD-10-CM
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
## 2019-02-18 PROCEDURE — 97112 NEUROMUSCULAR REEDUCATION: CPT | Mod: GP | Performed by: PHYSICAL THERAPIST

## 2019-02-18 PROCEDURE — 97140 MANUAL THERAPY 1/> REGIONS: CPT | Mod: GO | Performed by: OCCUPATIONAL THERAPIST

## 2019-02-18 PROCEDURE — 97110 THERAPEUTIC EXERCISES: CPT | Mod: GP | Performed by: PHYSICAL THERAPIST

## 2019-02-19 ENCOUNTER — OFFICE VISIT (OUTPATIENT)
Dept: SURGERY | Facility: CLINIC | Age: 54
End: 2019-02-19
Payer: COMMERCIAL

## 2019-02-19 VITALS
SYSTOLIC BLOOD PRESSURE: 140 MMHG | OXYGEN SATURATION: 96 % | HEIGHT: 60 IN | RESPIRATION RATE: 16 BRPM | BODY MASS INDEX: 57.52 KG/M2 | HEART RATE: 82 BPM | DIASTOLIC BLOOD PRESSURE: 86 MMHG | WEIGHT: 293 LBS

## 2019-02-19 DIAGNOSIS — E66.01 MORBID OBESITY WITH BMI OF 60.0-69.9, ADULT (H): ICD-10-CM

## 2019-02-19 DIAGNOSIS — I89.0 LYMPHEDEMA IN ADULT PATIENT: Primary | ICD-10-CM

## 2019-02-19 PROCEDURE — 99214 OFFICE O/P EST MOD 30 MIN: CPT | Performed by: INTERNAL MEDICINE

## 2019-02-19 RX ORDER — ALBUTEROL SULFATE 90 UG/1
2 AEROSOL, METERED RESPIRATORY (INHALATION) EVERY 6 HOURS PRN
COMMUNITY
Start: 2019-01-24 | End: 2023-04-28

## 2019-02-19 RX ORDER — FERROUS SULFATE 325(65) MG
TABLET ORAL
Qty: 30 TABLET | Refills: 0 | Status: SHIPPED | OUTPATIENT
Start: 2019-02-19 | End: 2019-03-19

## 2019-02-19 RX ORDER — MAGNESIUM OXIDE 400 MG/1
TABLET ORAL
Qty: 120 TABLET | Refills: 0 | Status: SHIPPED | OUTPATIENT
Start: 2019-02-19 | End: 2019-03-27

## 2019-02-19 ASSESSMENT — MIFFLIN-ST. JEOR: SCORE: 1990.23

## 2019-02-19 NOTE — PROGRESS NOTES
Vascular Medicine Progress Note     Mirta Decker is a 54 year old female who is here for follow-up on lymphedema    Interval History   Patient is doing well she is much better leg swelling is better, patient is wearing her compression stockings, patient is doing her wrapping with lymphedema PT sessions      Physical Exam       BP: 140/86 Pulse: 82   Resp: 16 SpO2: 96 %      Vitals:    02/19/19 1537   Weight: 147.9 kg (326 lb)     Vital Signs with Ranges  Pulse:  [82] 82  Resp:  [16] 16  BP: (140)/(86) 140/86  SpO2:  [96 %] 96 %  [unfilled]    Constitutional: awake, alert, cooperative, no apparent distress, and appears stated age  Eyes: Lids and lashes normal, pupils equal, round and reactive to light, extra ocular muscles intact, sclera clear, conjunctiva normal  ENT: normocepalic, without obvious abnormality, oropharynx pink and moist  Hematologic / Lymphatic: no lymphadenopathy  Respiratory: No increased work of breathing, good air exchange, clear to auscultation bilaterally, no crackles or wheezing  Cardiovascular: regular rate and rhythm, normal S1 and S2 and no murmur noted  GI: Normal bowel sounds, soft, non-distended, non-tender  Skin: no redness, warmth, or swelling, no rashes and no lesions  Musculoskeletal: There is no redness, warmth, or swelling of the joints.  Full range of motion noted.  Motor strength is 5 out of 5 all extremities bilaterally.  Tone is normal.  Neurologic: Awake, alert, oriented to name, place and time.  Cranial nerves II-XII are grossly intact.  Motor is 5 out of 5 bilaterally.    Neuropsychiatric:  Normal affect, memory, insight.  Pulses: Difficult to obtain the pulse  . No carotid bruits appreciated.     Medications         Data   No results found for this or any previous visit (from the past 24 hour(s)).    Assessment & Plan   No diagnosis found.      Summary: Lymphedema bilateral lower extremities, secondary,  Continue with lymphedema PT  Continue with compression  treatment  Continue with PT  Discussed with the patient the possibility of bariatric surgery, patient will think about it  Continue with BiPAP for obstructive sleep apnea  Continue to follow the dietitian instructions and recommendations  Follow-up in 3 months    Randy Terrazas MD

## 2019-02-19 NOTE — TELEPHONE ENCOUNTER
Pending Prescriptions:                       Disp   Refills    PAIN & FEVER 325 MG tablet [Pharmacy Med *180 ta*0            Sig: TAKE TWO TABLETS BY MOUTH EVERY SIX HOURS     FEROSUL 325 (65 Fe) MG tablet [Pharmacy M*30 tab*0            Sig: Take 1 tablet (325 mg) by mouth daily    magnesium oxide (MAG-OX) 400 MG tablet [P*120 ta*0            Sig: TAKE ONE TABLET BY MOUTH FOUR TIMES DAILY    Pt has an appt to see you 2- for med check    Last ov was 10-30-18 rtc in 3 months  Please fax 30 and close encounter  Cyn  [phone] (home) [phone] (work)

## 2019-02-25 ENCOUNTER — HOSPITAL ENCOUNTER (OUTPATIENT)
Dept: PHYSICAL THERAPY | Facility: CLINIC | Age: 54
Setting detail: THERAPIES SERIES
End: 2019-02-25
Attending: FAMILY MEDICINE
Payer: COMMERCIAL

## 2019-02-25 ENCOUNTER — HOSPITAL ENCOUNTER (OUTPATIENT)
Dept: OCCUPATIONAL THERAPY | Facility: CLINIC | Age: 54
Setting detail: THERAPIES SERIES
End: 2019-02-25
Attending: FAMILY MEDICINE
Payer: COMMERCIAL

## 2019-02-25 PROCEDURE — 97140 MANUAL THERAPY 1/> REGIONS: CPT | Mod: GO | Performed by: OCCUPATIONAL THERAPIST

## 2019-02-25 PROCEDURE — 97112 NEUROMUSCULAR REEDUCATION: CPT | Mod: GP | Performed by: PHYSICAL THERAPIST

## 2019-02-25 PROCEDURE — 97110 THERAPEUTIC EXERCISES: CPT | Mod: GP | Performed by: PHYSICAL THERAPIST

## 2019-02-26 ENCOUNTER — OFFICE VISIT (OUTPATIENT)
Dept: FAMILY MEDICINE | Facility: CLINIC | Age: 54
End: 2019-02-26

## 2019-02-26 VITALS
OXYGEN SATURATION: 98 % | TEMPERATURE: 98.4 F | BODY MASS INDEX: 57.52 KG/M2 | WEIGHT: 293 LBS | DIASTOLIC BLOOD PRESSURE: 77 MMHG | SYSTOLIC BLOOD PRESSURE: 137 MMHG | RESPIRATION RATE: 20 BRPM | HEIGHT: 60 IN | HEART RATE: 85 BPM

## 2019-02-26 DIAGNOSIS — I10 ESSENTIAL HYPERTENSION, BENIGN: ICD-10-CM

## 2019-02-26 DIAGNOSIS — F32.5 MAJOR DEPRESSIVE DISORDER WITH SINGLE EPISODE, IN FULL REMISSION (H): Primary | ICD-10-CM

## 2019-02-26 PROCEDURE — 99214 OFFICE O/P EST MOD 30 MIN: CPT | Performed by: FAMILY MEDICINE

## 2019-02-26 RX ORDER — METHOTREXATE SODIUM 25 MG/ML
0.4 INJECTION, SOLUTION INTRA-ARTERIAL; INTRAMUSCULAR; INTRAVENOUS
Refills: 0 | Status: ON HOLD | COMMUNITY
Start: 2019-01-25 | End: 2020-03-08

## 2019-02-26 RX ORDER — SERTRALINE HYDROCHLORIDE 100 MG/1
TABLET, FILM COATED ORAL
Qty: 90 TABLET | Refills: 3 | Status: SHIPPED | OUTPATIENT
Start: 2019-02-26 | End: 2020-01-22

## 2019-02-26 RX ORDER — LOSARTAN POTASSIUM 100 MG/1
100 TABLET ORAL DAILY
Qty: 90 TABLET | Refills: 3 | Status: SHIPPED | OUTPATIENT
Start: 2019-02-26 | End: 2020-01-22 | Stop reason: DRUGHIGH

## 2019-02-26 RX ORDER — TORSEMIDE 20 MG/1
TABLET ORAL
Status: ON HOLD | COMMUNITY
Start: 2018-07-03 | End: 2020-03-08

## 2019-02-26 RX ORDER — FOLIC ACID 1 MG/1
TABLET ORAL
Refills: 3 | COMMUNITY
Start: 2019-01-25 | End: 2020-01-22

## 2019-02-26 RX ORDER — CALCIPOTRIENE 50 UG/G
OINTMENT TOPICAL
Refills: 1 | COMMUNITY
Start: 2019-01-05 | End: 2019-05-30

## 2019-02-26 ASSESSMENT — ANXIETY QUESTIONNAIRES
2. NOT BEING ABLE TO STOP OR CONTROL WORRYING: SEVERAL DAYS
3. WORRYING TOO MUCH ABOUT DIFFERENT THINGS: SEVERAL DAYS
5. BEING SO RESTLESS THAT IT IS HARD TO SIT STILL: NOT AT ALL
IF YOU CHECKED OFF ANY PROBLEMS ON THIS QUESTIONNAIRE, HOW DIFFICULT HAVE THESE PROBLEMS MADE IT FOR YOU TO DO YOUR WORK, TAKE CARE OF THINGS AT HOME, OR GET ALONG WITH OTHER PEOPLE: SOMEWHAT DIFFICULT
GAD7 TOTAL SCORE: 5
6. BECOMING EASILY ANNOYED OR IRRITABLE: SEVERAL DAYS
1. FEELING NERVOUS, ANXIOUS, OR ON EDGE: SEVERAL DAYS
7. FEELING AFRAID AS IF SOMETHING AWFUL MIGHT HAPPEN: SEVERAL DAYS

## 2019-02-26 ASSESSMENT — PATIENT HEALTH QUESTIONNAIRE - PHQ9
5. POOR APPETITE OR OVEREATING: NOT AT ALL
SUM OF ALL RESPONSES TO PHQ QUESTIONS 1-9: 6

## 2019-02-26 ASSESSMENT — MIFFLIN-ST. JEOR: SCORE: 2061.89
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
## 2019-02-26 NOTE — NURSING NOTE
Elyssa is here today for a non-fasting med recheck.    Pre-visit Screening:  Immunizations:  up to date  Colonoscopy:  is up to date  Mammogram: is up to date  Asthma Action Test/Plan:  NA  PHQ9:  NA  GAD7:  NA  Questioned patient about current smoking habits Pt. has never smoked.  Ok to leave detailed message on voice mail for today's visit only Yes, phone # [phone]

## 2019-02-27 ENCOUNTER — HOSPITAL ENCOUNTER (OUTPATIENT)
Dept: PHYSICAL THERAPY | Facility: CLINIC | Age: 54
Setting detail: THERAPIES SERIES
End: 2019-02-27
Attending: FAMILY MEDICINE
Payer: COMMERCIAL

## 2019-02-27 ENCOUNTER — HOSPITAL ENCOUNTER (OUTPATIENT)
Dept: OCCUPATIONAL THERAPY | Facility: CLINIC | Age: 54
Setting detail: THERAPIES SERIES
End: 2019-02-27
Attending: FAMILY MEDICINE
Payer: COMMERCIAL

## 2019-02-27 PROCEDURE — 97140 MANUAL THERAPY 1/> REGIONS: CPT | Mod: GO | Performed by: OCCUPATIONAL THERAPIST

## 2019-02-27 PROCEDURE — 97112 NEUROMUSCULAR REEDUCATION: CPT | Mod: GP | Performed by: PHYSICAL THERAPIST

## 2019-02-27 PROCEDURE — 97110 THERAPEUTIC EXERCISES: CPT | Mod: GP | Performed by: PHYSICAL THERAPIST

## 2019-02-27 ASSESSMENT — ANXIETY QUESTIONNAIRES: GAD7 TOTAL SCORE: 5

## 2019-02-27 NOTE — PATIENT INSTRUCTIONS
Major depressive disorder with single episode, in full remission (H)  (primary encounter diagnosis)  Plan: sertraline (ZOLOFT) 100 MG tablet        I've explained to her that drugs of the SSRI class can have side effects such as weight gain, sexual dysfunction, insomnia, headache, nausea. These medications are generally effective at alleviating symptoms of anxiety and/or depression. Let me know if significant side effects do occur.  Support/ recheck 1 year    (I10) Essential hypertension, benign  Plan: losartan (COZAAR) 100 MG tablet        1)  Medication: continue current medication regimen unchanged  2)  Dietary sodium restriction  3)  Regular aerobic exercise  4)  Recheck in 1 year, sooner should new symptoms or   problems arise.    Patient Education: Reviewed risks of hypertension and principles of   treatment.

## 2019-03-04 ENCOUNTER — HOSPITAL ENCOUNTER (OUTPATIENT)
Dept: PHYSICAL THERAPY | Facility: CLINIC | Age: 54
Setting detail: THERAPIES SERIES
End: 2019-03-04
Attending: FAMILY MEDICINE
Payer: COMMERCIAL

## 2019-03-04 ENCOUNTER — HOSPITAL ENCOUNTER (OUTPATIENT)
Dept: OCCUPATIONAL THERAPY | Facility: CLINIC | Age: 54
Setting detail: THERAPIES SERIES
End: 2019-03-04
Attending: FAMILY MEDICINE
Payer: COMMERCIAL

## 2019-03-04 PROCEDURE — 97110 THERAPEUTIC EXERCISES: CPT | Mod: GP | Performed by: PHYSICAL THERAPIST

## 2019-03-04 PROCEDURE — 97140 MANUAL THERAPY 1/> REGIONS: CPT | Mod: GO | Performed by: OCCUPATIONAL THERAPIST

## 2019-03-06 ENCOUNTER — HOSPITAL ENCOUNTER (OUTPATIENT)
Dept: PHYSICAL THERAPY | Facility: CLINIC | Age: 54
Setting detail: THERAPIES SERIES
End: 2019-03-06
Attending: FAMILY MEDICINE
Payer: COMMERCIAL

## 2019-03-06 ENCOUNTER — HOSPITAL ENCOUNTER (OUTPATIENT)
Dept: OCCUPATIONAL THERAPY | Facility: CLINIC | Age: 54
Setting detail: THERAPIES SERIES
End: 2019-03-06
Attending: FAMILY MEDICINE
Payer: COMMERCIAL

## 2019-03-06 PROCEDURE — 97112 NEUROMUSCULAR REEDUCATION: CPT | Mod: GP | Performed by: PHYSICAL THERAPIST

## 2019-03-06 PROCEDURE — 97110 THERAPEUTIC EXERCISES: CPT | Mod: GP | Performed by: PHYSICAL THERAPIST

## 2019-03-06 PROCEDURE — 97140 MANUAL THERAPY 1/> REGIONS: CPT | Mod: GO | Performed by: OCCUPATIONAL THERAPIST

## 2019-03-06 PROCEDURE — 97116 GAIT TRAINING THERAPY: CPT | Mod: GP | Performed by: PHYSICAL THERAPIST
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
## 2019-03-18 ENCOUNTER — HOSPITAL ENCOUNTER (OUTPATIENT)
Dept: PHYSICAL THERAPY | Facility: CLINIC | Age: 54
Setting detail: THERAPIES SERIES
End: 2019-03-18
Attending: FAMILY MEDICINE
Payer: COMMERCIAL

## 2019-03-18 PROCEDURE — 97116 GAIT TRAINING THERAPY: CPT | Mod: GP | Performed by: PHYSICAL THERAPIST

## 2019-03-18 PROCEDURE — 97110 THERAPEUTIC EXERCISES: CPT | Mod: GP | Performed by: PHYSICAL THERAPIST

## 2019-03-18 PROCEDURE — 97112 NEUROMUSCULAR REEDUCATION: CPT | Mod: GP | Performed by: PHYSICAL THERAPIST

## 2019-03-19 DIAGNOSIS — K59.03 DRUG-INDUCED CONSTIPATION: ICD-10-CM

## 2019-03-19 DIAGNOSIS — K21.9 GASTROESOPHAGEAL REFLUX DISEASE WITHOUT ESOPHAGITIS: ICD-10-CM

## 2019-03-19 DIAGNOSIS — D50.0 IRON DEFICIENCY ANEMIA DUE TO CHRONIC BLOOD LOSS: ICD-10-CM

## 2019-03-20 ENCOUNTER — HOSPITAL ENCOUNTER (OUTPATIENT)
Dept: PHYSICAL THERAPY | Facility: CLINIC | Age: 54
Setting detail: THERAPIES SERIES
End: 2019-03-20
Attending: FAMILY MEDICINE
Payer: COMMERCIAL

## 2019-03-20 ENCOUNTER — HOSPITAL ENCOUNTER (OUTPATIENT)
Dept: OCCUPATIONAL THERAPY | Facility: CLINIC | Age: 54
Setting detail: THERAPIES SERIES
End: 2019-03-20
Attending: FAMILY MEDICINE
Payer: COMMERCIAL

## 2019-03-20 PROCEDURE — 97110 THERAPEUTIC EXERCISES: CPT | Mod: GP | Performed by: PHYSICAL THERAPIST

## 2019-03-20 PROCEDURE — 97140 MANUAL THERAPY 1/> REGIONS: CPT | Mod: GO | Performed by: OCCUPATIONAL THERAPIST

## 2019-03-20 RX ORDER — FERROUS SULFATE 325(65) MG
TABLET ORAL
Qty: 90 TABLET | Refills: 0 | Status: SHIPPED | OUTPATIENT
Start: 2019-03-20 | End: 2019-05-30

## 2019-03-20 RX ORDER — DIPHENHYDRAMINE HCL 50 MG/1
CAPSULE ORAL
Qty: 90 TABLET | Refills: 0 | Status: SHIPPED | OUTPATIENT
Start: 2019-03-20 | End: 2019-05-22

## 2019-03-20 RX ORDER — SENNOSIDES 8.6 MG/1
TABLET ORAL
Qty: 90 TABLET | Refills: 0 | Status: SHIPPED | OUTPATIENT
Start: 2019-03-20 | End: 2019-05-30

## 2019-03-20 RX ORDER — FAMOTIDINE 20 MG/1
20 TABLET, FILM COATED ORAL 2 TIMES DAILY
Qty: 180 TABLET | Refills: 0 | Status: SHIPPED | OUTPATIENT
Start: 2019-03-20 | End: 2019-05-30

## 2019-03-20 NOTE — TELEPHONE ENCOUNTER
Please let the patient know that a 90 day refill has been sent for their prescription.  They are due for a return fasting office visit within 90 days.  Failure to schedule an appointment may result in no further refills of his/her medication.

## 2019-03-20 NOTE — TELEPHONE ENCOUNTER
Pending Prescriptions:                       Disp   Refills    famotidine (PEPCID) 20 MG tablet [Pharmac*180 ta*             Sig: Take 1 tablet (20 mg) by mouth 2 times daily    EQL VITAMIN C 500 MG TABS [Pharmacy Med N*90 tab*             Sig: Take 1 tablet (500 mg) by mouth daily With iron    FEROSUL 325 (65 Fe) MG tablet [Pharmacy M*30 tab*             Sig: Take 1 tablet (325 mg) by mouth daily    SM SENNA LAXATIVE 8.6 MG tablet [Pharmacy*30 tab*             Sig: TAKE 1 TABLET BY MOUTH EVERY DAY AS NEEDED FOR           CONSTIPATION    Pt here last 2-  Please change qty fax or deny or close encounter  Cyn  [phone] (home) [phone] (work)

## 2019-03-22 ENCOUNTER — TELEPHONE (OUTPATIENT)
Dept: FAMILY MEDICINE | Facility: CLINIC | Age: 54
End: 2019-03-22

## 2019-03-22 ENCOUNTER — TRANSFERRED RECORDS (OUTPATIENT)
Dept: HEALTH INFORMATION MANAGEMENT | Facility: CLINIC | Age: 54
End: 2019-03-22

## 2019-03-22 ENCOUNTER — OFFICE VISIT - HEALTHEAST (OUTPATIENT)
Dept: PULMONOLOGY | Facility: OTHER | Age: 54
End: 2019-03-22

## 2019-03-22 ENCOUNTER — TRANSFERRED RECORDS (OUTPATIENT)
Dept: FAMILY MEDICINE | Facility: CLINIC | Age: 54
End: 2019-03-22

## 2019-03-22 DIAGNOSIS — J45.21 MILD INTERMITTENT ASTHMA WITH EXACERBATION: ICD-10-CM

## 2019-03-22 DIAGNOSIS — I50.30 (HFPEF) HEART FAILURE WITH PRESERVED EJECTION FRACTION (H): ICD-10-CM

## 2019-03-22 DIAGNOSIS — R09.02 HYPOXIA: ICD-10-CM

## 2019-03-22 DIAGNOSIS — L40.50 PSORIASIS WITH ARTHROPATHY (H): ICD-10-CM

## 2019-03-22 DIAGNOSIS — J96.12 CHRONIC HYPERCAPNIC RESPIRATORY FAILURE (H): ICD-10-CM

## 2019-03-22 DIAGNOSIS — E66.01 MORBID OBESITY WITH BMI OF 60.0-69.9, ADULT (H): ICD-10-CM

## 2019-03-25 ENCOUNTER — HOSPITAL ENCOUNTER (OUTPATIENT)
Dept: PHYSICAL THERAPY | Facility: CLINIC | Age: 54
Setting detail: THERAPIES SERIES
End: 2019-03-25
Attending: FAMILY MEDICINE
Payer: COMMERCIAL

## 2019-03-25 ENCOUNTER — HOSPITAL ENCOUNTER (OUTPATIENT)
Dept: OCCUPATIONAL THERAPY | Facility: CLINIC | Age: 54
Setting detail: THERAPIES SERIES
End: 2019-03-25
Attending: FAMILY MEDICINE
Payer: COMMERCIAL

## 2019-03-25 PROCEDURE — 97110 THERAPEUTIC EXERCISES: CPT | Mod: GP | Performed by: PHYSICAL THERAPIST

## 2019-03-25 PROCEDURE — 97140 MANUAL THERAPY 1/> REGIONS: CPT | Mod: GO | Performed by: OCCUPATIONAL THERAPIST

## 2019-03-27 ENCOUNTER — HOSPITAL ENCOUNTER (OUTPATIENT)
Dept: OCCUPATIONAL THERAPY | Facility: CLINIC | Age: 54
Setting detail: THERAPIES SERIES
End: 2019-03-27
Attending: FAMILY MEDICINE
Payer: COMMERCIAL

## 2019-03-27 ENCOUNTER — HOSPITAL ENCOUNTER (OUTPATIENT)
Dept: PHYSICAL THERAPY | Facility: CLINIC | Age: 54
Setting detail: THERAPIES SERIES
End: 2019-03-27
Attending: FAMILY MEDICINE
Payer: COMMERCIAL

## 2019-03-27 DIAGNOSIS — K59.03 DRUG-INDUCED CONSTIPATION: ICD-10-CM

## 2019-03-27 PROCEDURE — 97140 MANUAL THERAPY 1/> REGIONS: CPT | Mod: GO | Performed by: OCCUPATIONAL THERAPIST

## 2019-03-27 PROCEDURE — 97110 THERAPEUTIC EXERCISES: CPT | Mod: GP | Performed by: PHYSICAL THERAPIST

## 2019-03-27 PROCEDURE — 97112 NEUROMUSCULAR REEDUCATION: CPT | Mod: GP | Performed by: PHYSICAL THERAPIST

## 2019-03-27 RX ORDER — MAGNESIUM OXIDE 400 MG/1
TABLET ORAL
Qty: 120 TABLET | Refills: 0 | Status: SHIPPED | OUTPATIENT
Start: 2019-03-27 | End: 2019-05-02

## 2019-03-27 NOTE — TELEPHONE ENCOUNTER
Mirta Decker is requesting a refill of:    Pending Prescriptions:                       Disp   Refills    magnesium oxide (MAG-OX) 400 MG tablet [P*120 ta*0            Sig: TAKE ONE TABLET BY MOUTH FOUR TIMES DAILY    Please close encounter if RX was sent. Thanks, Barbara

## 2019-03-29 ENCOUNTER — TRANSFERRED RECORDS (OUTPATIENT)
Dept: FAMILY MEDICINE | Facility: CLINIC | Age: 54
End: 2019-03-29

## 2019-03-29 ENCOUNTER — AMBULATORY - HEALTHEAST (OUTPATIENT)
Dept: PULMONOLOGY | Facility: OTHER | Age: 54
End: 2019-03-29

## 2019-04-03 ENCOUNTER — HOSPITAL ENCOUNTER (OUTPATIENT)
Dept: OCCUPATIONAL THERAPY | Facility: CLINIC | Age: 54
Setting detail: THERAPIES SERIES
End: 2019-04-03
Attending: FAMILY MEDICINE
Payer: COMMERCIAL

## 2019-04-03 ENCOUNTER — HOSPITAL ENCOUNTER (OUTPATIENT)
Dept: PHYSICAL THERAPY | Facility: CLINIC | Age: 54
Setting detail: THERAPIES SERIES
End: 2019-04-03
Attending: FAMILY MEDICINE
Payer: COMMERCIAL

## 2019-04-03 PROCEDURE — 97140 MANUAL THERAPY 1/> REGIONS: CPT | Mod: GO | Performed by: OCCUPATIONAL THERAPIST

## 2019-04-03 PROCEDURE — 97110 THERAPEUTIC EXERCISES: CPT | Mod: GP | Performed by: PHYSICAL THERAPIST

## 2019-04-03 PROCEDURE — 97750 PHYSICAL PERFORMANCE TEST: CPT | Mod: GP,59 | Performed by: PHYSICAL THERAPIST

## 2019-04-03 NOTE — PROGRESS NOTES
04/03/19 1400   Signing Clinician's Name / Credentials   Signing clinician's name / credentials Windy Buckley PT   Dynamic Gait Index (Cruz and Tripp [place], 1995)   Gait Level Surface 2  (no AD)   Change in Gait Speed 3   Gait and Horizontal Head Turns 3   Gait with Vertical Head Turns 3   Gait and Pivot Turns 3   Step Over Obstacle 0   Step Around Obstacles 3   Steps 1   Total Dynamic Gait Index Score  (A score of 19 or less has been correlated to an increased risk of falls in community dwelling older adults, patients with vestibular disorders, and patients with MS.)   Total Score (out of 24) 18   Dynamic Gait Index (DGI):The DGI is a measure of balance during gait that is reliable and valid for the elderly and individuals with Parkinson's disease, MS, vestibular disorders, or s/p stroke. Gait assistive device used: none     Patient score: 18/24  Scores ?19/24 indicate an increased risk for falls according to Rosina et al 2000  Minimal Detectable Change = 2.9 in community dwelling elderly according to Adi et al 2011    Assessment (rationale for performing, application to patient s function & care plan): assessment of gait stability to assist in care planning, determine fall risk and make appropriate recommendations on use of assistive device.     Minutes billed as physical performance test: 16

## 2019-04-08 ENCOUNTER — AMBULATORY - HEALTHEAST (OUTPATIENT)
Dept: PULMONOLOGY | Facility: OTHER | Age: 54
End: 2019-04-08

## 2019-04-08 ENCOUNTER — HOSPITAL ENCOUNTER (OUTPATIENT)
Dept: OCCUPATIONAL THERAPY | Facility: CLINIC | Age: 54
Setting detail: THERAPIES SERIES
End: 2019-04-08
Attending: FAMILY MEDICINE
Payer: COMMERCIAL

## 2019-04-08 DIAGNOSIS — J45.21 MILD INTERMITTENT ASTHMA WITH EXACERBATION: ICD-10-CM

## 2019-04-08 DIAGNOSIS — E66.2 OBESITY HYPOVENTILATION SYNDROME (H): ICD-10-CM

## 2019-04-08 DIAGNOSIS — J98.4 RESTRICTIVE LUNG DISEASE: ICD-10-CM

## 2019-04-08 PROCEDURE — 97140 MANUAL THERAPY 1/> REGIONS: CPT | Mod: GO | Performed by: OCCUPATIONAL THERAPIST

## 2019-04-12 ENCOUNTER — HOSPITAL ENCOUNTER (OUTPATIENT)
Dept: OCCUPATIONAL THERAPY | Facility: CLINIC | Age: 54
Setting detail: THERAPIES SERIES
End: 2019-04-12
Attending: FAMILY MEDICINE
Payer: COMMERCIAL

## 2019-04-12 PROCEDURE — 97140 MANUAL THERAPY 1/> REGIONS: CPT | Mod: GO | Performed by: OCCUPATIONAL THERAPIST

## 2019-04-15 ENCOUNTER — HOSPITAL ENCOUNTER (OUTPATIENT)
Dept: OCCUPATIONAL THERAPY | Facility: CLINIC | Age: 54
Setting detail: THERAPIES SERIES
End: 2019-04-15
Attending: FAMILY MEDICINE
Payer: COMMERCIAL

## 2019-04-15 ENCOUNTER — HOSPITAL ENCOUNTER (OUTPATIENT)
Dept: CARDIAC REHAB | Facility: CLINIC | Age: 54
End: 2019-04-15
Attending: INTERNAL MEDICINE
Payer: COMMERCIAL

## 2019-04-15 PROCEDURE — 97140 MANUAL THERAPY 1/> REGIONS: CPT | Mod: GO | Performed by: OCCUPATIONAL THERAPIST

## 2019-04-15 PROCEDURE — G0238 OTH RESP PROC, INDIV: HCPCS

## 2019-04-15 PROCEDURE — 40000244 ZZH STATISTIC VISIT PULM REHAB

## 2019-04-15 PROCEDURE — G0237 THERAPEUTIC PROCD STRG ENDUR: HCPCS
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
## 2019-04-17 ENCOUNTER — HOSPITAL ENCOUNTER (OUTPATIENT)
Dept: OCCUPATIONAL THERAPY | Facility: CLINIC | Age: 54
Setting detail: THERAPIES SERIES
End: 2019-04-17
Attending: FAMILY MEDICINE
Payer: COMMERCIAL

## 2019-04-17 PROCEDURE — 97140 MANUAL THERAPY 1/> REGIONS: CPT | Mod: GO | Performed by: OCCUPATIONAL THERAPIST
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
## 2019-04-20 NOTE — TELEPHONE ENCOUNTER
Received incoming refill request for   Pending Prescriptions:                       Disp   Refills    MAPAP 325 MG tablet [Pharmacy Med Name: M*180 ta*0            Sig: TAKE TWO TABLETS BY MOUTH EVERY SIX HOURS    Patient was last in office to discuss pain on 2/26/19. Routing to Dr. Martin for approval or denial of medication.

## 2019-04-22 RX ORDER — ACETAMINOPHEN 325 MG/1
TABLET ORAL
Qty: 180 TABLET | Refills: 0 | OUTPATIENT
Start: 2019-04-22

## 2019-04-23 ENCOUNTER — HOSPITAL ENCOUNTER (OUTPATIENT)
Dept: OCCUPATIONAL THERAPY | Facility: CLINIC | Age: 54
Setting detail: THERAPIES SERIES
End: 2019-04-23
Attending: FAMILY MEDICINE
Payer: COMMERCIAL

## 2019-04-23 PROCEDURE — 97140 MANUAL THERAPY 1/> REGIONS: CPT | Mod: GO | Performed by: OCCUPATIONAL THERAPIST

## 2019-04-29 ENCOUNTER — HOSPITAL ENCOUNTER (OUTPATIENT)
Dept: OCCUPATIONAL THERAPY | Facility: CLINIC | Age: 54
Setting detail: THERAPIES SERIES
End: 2019-04-29
Attending: FAMILY MEDICINE
Payer: COMMERCIAL

## 2019-04-29 ENCOUNTER — HOSPITAL ENCOUNTER (OUTPATIENT)
Dept: CARDIAC REHAB | Facility: CLINIC | Age: 54
End: 2019-04-29
Attending: INTERNAL MEDICINE
Payer: COMMERCIAL

## 2019-04-29 PROCEDURE — G0237 THERAPEUTIC PROCD STRG ENDUR: HCPCS

## 2019-04-29 PROCEDURE — G0238 OTH RESP PROC, INDIV: HCPCS

## 2019-04-29 PROCEDURE — 97140 MANUAL THERAPY 1/> REGIONS: CPT | Mod: GO | Performed by: OCCUPATIONAL THERAPIST

## 2019-04-29 PROCEDURE — 40000244 ZZH STATISTIC VISIT PULM REHAB

## 2019-05-01 ENCOUNTER — HOSPITAL ENCOUNTER (OUTPATIENT)
Dept: OCCUPATIONAL THERAPY | Facility: CLINIC | Age: 54
Setting detail: THERAPIES SERIES
End: 2019-05-01
Attending: FAMILY MEDICINE
Payer: COMMERCIAL

## 2019-05-01 PROCEDURE — 97140 MANUAL THERAPY 1/> REGIONS: CPT | Mod: GO | Performed by: OCCUPATIONAL THERAPIST

## 2019-05-02 DIAGNOSIS — K59.03 DRUG-INDUCED CONSTIPATION: ICD-10-CM

## 2019-05-02 DIAGNOSIS — K21.9 GASTROESOPHAGEAL REFLUX DISEASE WITHOUT ESOPHAGITIS: ICD-10-CM

## 2019-05-02 DIAGNOSIS — D50.0 IRON DEFICIENCY ANEMIA DUE TO CHRONIC BLOOD LOSS: ICD-10-CM

## 2019-05-02 NOTE — TELEPHONE ENCOUNTER
Pending Prescriptions:                       Disp   Refills    magnesium oxide (MAG-OX) 400 MG tablet [P*120 ta*0            Sig: TAKE ONE TABLET BY MOUTH FOUR TIMES DAILY     Multiple Vitamins-Minerals (CENTROVITE) T*30 tab*             Sig: TAKE 1 TABLET BY MOUTH ONE TIME DAILY       Last refill for mag ox was 3-  Last refill for centrovite was 1-  Last ov was 2- rtc in one year  Per notes on 3- needs to be seen  Pt HAS an appt on 5-  Please change qty fax deny or close encounter  Cyn  [phone] (home) [phone] (work)

## 2019-05-03 RX ORDER — MAGNESIUM OXIDE 400 MG/1
TABLET ORAL
Qty: 120 TABLET | Refills: 0 | Status: SHIPPED | OUTPATIENT
Start: 2019-05-03 | End: 2019-05-30

## 2019-05-03 RX ORDER — MULTIVITAMIN/IRON/FOLIC ACID 18MG-0.4MG
TABLET ORAL
Qty: 30 TABLET | Refills: 0 | Status: SHIPPED | OUTPATIENT
Start: 2019-05-03 | End: 2019-05-22

## 2019-05-06 ENCOUNTER — HOSPITAL ENCOUNTER (OUTPATIENT)
Dept: OCCUPATIONAL THERAPY | Facility: CLINIC | Age: 54
Setting detail: THERAPIES SERIES
End: 2019-05-06
Attending: FAMILY MEDICINE
Payer: COMMERCIAL

## 2019-05-06 PROCEDURE — 97140 MANUAL THERAPY 1/> REGIONS: CPT | Mod: GO | Performed by: OCCUPATIONAL THERAPIST

## 2019-05-07 ENCOUNTER — HOSPITAL ENCOUNTER (OUTPATIENT)
Dept: CARDIAC REHAB | Facility: CLINIC | Age: 54
End: 2019-05-07
Attending: INTERNAL MEDICINE
Payer: COMMERCIAL

## 2019-05-07 PROCEDURE — 40000244 ZZH STATISTIC VISIT PULM REHAB

## 2019-05-07 PROCEDURE — G0239 OTH RESP PROC, GROUP: HCPCS

## 2019-05-08 ENCOUNTER — HOSPITAL ENCOUNTER (OUTPATIENT)
Dept: OCCUPATIONAL THERAPY | Facility: CLINIC | Age: 54
Setting detail: THERAPIES SERIES
End: 2019-05-08
Attending: FAMILY MEDICINE
Payer: COMMERCIAL

## 2019-05-08 PROCEDURE — 97140 MANUAL THERAPY 1/> REGIONS: CPT | Mod: GO | Performed by: OCCUPATIONAL THERAPIST

## 2019-05-09 ENCOUNTER — HOSPITAL ENCOUNTER (OUTPATIENT)
Dept: CARDIAC REHAB | Facility: CLINIC | Age: 54
End: 2019-05-09
Attending: INTERNAL MEDICINE
Payer: COMMERCIAL

## 2019-05-09 PROCEDURE — G0239 OTH RESP PROC, GROUP: HCPCS

## 2019-05-09 PROCEDURE — 40000244 ZZH STATISTIC VISIT PULM REHAB

## 2019-05-15 ENCOUNTER — HOSPITAL ENCOUNTER (OUTPATIENT)
Dept: OCCUPATIONAL THERAPY | Facility: CLINIC | Age: 54
Setting detail: THERAPIES SERIES
End: 2019-05-15
Attending: FAMILY MEDICINE
Payer: COMMERCIAL

## 2019-05-15 PROCEDURE — 97140 MANUAL THERAPY 1/> REGIONS: CPT | Mod: GO | Performed by: OCCUPATIONAL THERAPIST

## 2019-05-16 ENCOUNTER — RECORDS - HEALTHEAST (OUTPATIENT)
Dept: ADMINISTRATIVE | Facility: OTHER | Age: 54
End: 2019-05-16

## 2019-05-16 ENCOUNTER — HOSPITAL ENCOUNTER (OUTPATIENT)
Dept: CARDIAC REHAB | Facility: CLINIC | Age: 54
End: 2019-05-16
Attending: INTERNAL MEDICINE
Payer: COMMERCIAL

## 2019-05-16 PROCEDURE — 40000244 ZZH STATISTIC VISIT PULM REHAB

## 2019-05-16 PROCEDURE — G0239 OTH RESP PROC, GROUP: HCPCS

## 2019-05-20 ENCOUNTER — TRANSFERRED RECORDS (OUTPATIENT)
Dept: FAMILY MEDICINE | Facility: CLINIC | Age: 54
End: 2019-05-20

## 2019-05-20 ENCOUNTER — HOSPITAL ENCOUNTER (OUTPATIENT)
Dept: OCCUPATIONAL THERAPY | Facility: CLINIC | Age: 54
Setting detail: THERAPIES SERIES
End: 2019-05-20
Attending: FAMILY MEDICINE
Payer: COMMERCIAL

## 2019-05-20 ENCOUNTER — OFFICE VISIT - HEALTHEAST (OUTPATIENT)
Dept: SLEEP MEDICINE | Facility: CLINIC | Age: 54
End: 2019-05-20

## 2019-05-20 DIAGNOSIS — G47.33 OSA (OBSTRUCTIVE SLEEP APNEA): ICD-10-CM

## 2019-05-20 PROCEDURE — 97140 MANUAL THERAPY 1/> REGIONS: CPT | Mod: GO | Performed by: OCCUPATIONAL THERAPIST

## 2019-05-20 ASSESSMENT — MIFFLIN-ST. JEOR: SCORE: 2138.53

## 2019-05-21 ENCOUNTER — AMBULATORY - HEALTHEAST (OUTPATIENT)
Dept: SLEEP MEDICINE | Facility: CLINIC | Age: 54
End: 2019-05-21

## 2019-05-22 ENCOUNTER — HOSPITAL ENCOUNTER (OUTPATIENT)
Dept: OCCUPATIONAL THERAPY | Facility: CLINIC | Age: 54
Setting detail: THERAPIES SERIES
End: 2019-05-22
Attending: FAMILY MEDICINE
Payer: COMMERCIAL

## 2019-05-22 DIAGNOSIS — D50.0 IRON DEFICIENCY ANEMIA DUE TO CHRONIC BLOOD LOSS: ICD-10-CM

## 2019-05-22 DIAGNOSIS — K21.9 GASTROESOPHAGEAL REFLUX DISEASE WITHOUT ESOPHAGITIS: ICD-10-CM

## 2019-05-22 PROCEDURE — 97140 MANUAL THERAPY 1/> REGIONS: CPT | Mod: GO | Performed by: OCCUPATIONAL THERAPIST

## 2019-05-22 RX ORDER — MULTIVITAMIN/IRON/FOLIC ACID 18MG-0.4MG
TABLET ORAL
Qty: 15 TABLET | Refills: 0 | Status: SHIPPED | OUTPATIENT
Start: 2019-05-22 | End: 2019-05-30

## 2019-05-22 RX ORDER — DIPHENHYDRAMINE HCL 50 MG/1
CAPSULE ORAL
Qty: 15 TABLET | Refills: 0 | Status: SHIPPED | OUTPATIENT
Start: 2019-05-22 | End: 2019-05-30

## 2019-05-22 NOTE — TELEPHONE ENCOUNTER
Pending Prescriptions:                       Disp   Refills    EQL VITAMIN C 500 MG TABS [Pharmacy Med N*15 tab*0            Sig: Take 1 tablet (500 mg) by mouth daily With iron    Multiple Vitamins-Minerals (CENTROVITE) T*15 tab*0            Sig: TAKE 1 TABLET BY MOUTH ONE TIME DAILY     Last refill for 5-3-2019 for multi  Last refill for eql 3-2019  Pt does have an appt 5-  Fax change qty or deny and close mick Addison  [phone] (home) [phone] (work)

## 2019-05-24 ENCOUNTER — RECORDS - HEALTHEAST (OUTPATIENT)
Dept: ADMINISTRATIVE | Facility: OTHER | Age: 54
End: 2019-05-24

## 2019-05-28 ENCOUNTER — HOSPITAL ENCOUNTER (OUTPATIENT)
Dept: CARDIAC REHAB | Facility: CLINIC | Age: 54
End: 2019-05-28
Attending: INTERNAL MEDICINE
Payer: COMMERCIAL

## 2019-05-28 PROCEDURE — 40000244 ZZH STATISTIC VISIT PULM REHAB

## 2019-05-28 PROCEDURE — G0239 OTH RESP PROC, GROUP: HCPCS

## 2019-05-29 ENCOUNTER — HOSPITAL ENCOUNTER (OUTPATIENT)
Dept: OCCUPATIONAL THERAPY | Facility: CLINIC | Age: 54
Setting detail: THERAPIES SERIES
End: 2019-05-29
Attending: FAMILY MEDICINE
Payer: COMMERCIAL

## 2019-05-29 PROCEDURE — 97140 MANUAL THERAPY 1/> REGIONS: CPT | Mod: GO | Performed by: OCCUPATIONAL THERAPIST

## 2019-05-30 ENCOUNTER — HOSPITAL ENCOUNTER (OUTPATIENT)
Dept: CARDIAC REHAB | Facility: CLINIC | Age: 54
End: 2019-05-30
Attending: INTERNAL MEDICINE
Payer: COMMERCIAL

## 2019-05-30 ENCOUNTER — OFFICE VISIT (OUTPATIENT)
Dept: FAMILY MEDICINE | Facility: CLINIC | Age: 54
End: 2019-05-30

## 2019-05-30 VITALS
SYSTOLIC BLOOD PRESSURE: 136 MMHG | TEMPERATURE: 98.4 F | OXYGEN SATURATION: 96 % | WEIGHT: 293 LBS | DIASTOLIC BLOOD PRESSURE: 88 MMHG | RESPIRATION RATE: 20 BRPM | HEIGHT: 60 IN | HEART RATE: 81 BPM | BODY MASS INDEX: 57.52 KG/M2

## 2019-05-30 DIAGNOSIS — Z79.899 ENCOUNTER FOR LONG-TERM (CURRENT) USE OF MEDICATIONS: ICD-10-CM

## 2019-05-30 DIAGNOSIS — K59.03 DRUG-INDUCED CONSTIPATION: ICD-10-CM

## 2019-05-30 DIAGNOSIS — D50.0 IRON DEFICIENCY ANEMIA DUE TO CHRONIC BLOOD LOSS: Primary | ICD-10-CM

## 2019-05-30 DIAGNOSIS — K21.9 GASTROESOPHAGEAL REFLUX DISEASE WITHOUT ESOPHAGITIS: ICD-10-CM

## 2019-05-30 DIAGNOSIS — L40.50 PSORIATIC ARTHROPATHY (H): ICD-10-CM

## 2019-05-30 DIAGNOSIS — J30.1 CHRONIC SEASONAL ALLERGIC RHINITIS DUE TO POLLEN: ICD-10-CM

## 2019-05-30 LAB
% GRANULOCYTES: 76.5 %
HCT VFR BLD AUTO: 36.3 % (ref 35–47)
HEMOGLOBIN: 11.4 G/DL (ref 11.7–15.7)
LYMPHOCYTES NFR BLD AUTO: 17.7 %
MCH RBC QN AUTO: 28.8 PG (ref 26–33)
MCHC RBC AUTO-ENTMCNC: 31.4 G/DL (ref 31–36)
MCV RBC AUTO: 91.7 FL (ref 78–100)
MONOCYTES NFR BLD AUTO: 5.8 %
PLATELET COUNT - QUEST: 259 10^9/L (ref 150–375)
RBC # BLD AUTO: 3.96 10*12/L (ref 3.8–5.2)
WBC # BLD AUTO: 7.4 10*9/L (ref 4–11)

## 2019-05-30 PROCEDURE — G0239 OTH RESP PROC, GROUP: HCPCS

## 2019-05-30 PROCEDURE — 40000244 ZZH STATISTIC VISIT PULM REHAB

## 2019-05-30 PROCEDURE — 85025 COMPLETE CBC W/AUTO DIFF WBC: CPT | Performed by: FAMILY MEDICINE

## 2019-05-30 PROCEDURE — 36415 COLL VENOUS BLD VENIPUNCTURE: CPT | Performed by: FAMILY MEDICINE

## 2019-05-30 PROCEDURE — 99213 OFFICE O/P EST LOW 20 MIN: CPT | Performed by: FAMILY MEDICINE

## 2019-05-30 RX ORDER — FERROUS SULFATE 325(65) MG
TABLET ORAL
Qty: 90 TABLET | Refills: 1 | Status: SHIPPED | OUTPATIENT
Start: 2019-05-30 | End: 2020-01-22

## 2019-05-30 RX ORDER — CETIRIZINE HYDROCHLORIDE 10 MG/1
TABLET ORAL
Qty: 90 TABLET | Refills: 1 | Status: SHIPPED | OUTPATIENT
Start: 2019-05-30 | End: 2020-01-22

## 2019-05-30 RX ORDER — ASCORBIC ACID 500 MG
TABLET ORAL
Qty: 90 TABLET | Refills: 1 | Status: SHIPPED | OUTPATIENT
Start: 2019-05-30 | End: 2020-01-22

## 2019-05-30 RX ORDER — MULTIVITAMIN/IRON/FOLIC ACID 18MG-0.4MG
TABLET ORAL
Qty: 90 TABLET | Refills: 1 | Status: SHIPPED | OUTPATIENT
Start: 2019-05-30 | End: 2019-11-23

## 2019-05-30 RX ORDER — MAGNESIUM OXIDE 400 MG/1
400 TABLET ORAL 3 TIMES DAILY
Qty: 180 TABLET | Refills: 1 | Status: SHIPPED | OUTPATIENT
Start: 2019-05-30 | End: 2020-01-22

## 2019-05-30 RX ORDER — SENNOSIDES A AND B 8.6 MG/1
1 TABLET, FILM COATED ORAL DAILY
Qty: 90 TABLET | Refills: 1 | Status: SHIPPED | OUTPATIENT
Start: 2019-05-30 | End: 2020-01-22

## 2019-05-30 RX ORDER — FAMOTIDINE 20 MG/1
20 TABLET, FILM COATED ORAL 2 TIMES DAILY
Qty: 180 TABLET | Refills: 1 | Status: SHIPPED | OUTPATIENT
Start: 2019-05-30 | End: 2020-01-22

## 2019-05-30 ASSESSMENT — PATIENT HEALTH QUESTIONNAIRE - PHQ9
5. POOR APPETITE OR OVEREATING: NOT AT ALL
SUM OF ALL RESPONSES TO PHQ QUESTIONS 1-9: 6

## 2019-05-30 ASSESSMENT — ANXIETY QUESTIONNAIRES
1. FEELING NERVOUS, ANXIOUS, OR ON EDGE: MORE THAN HALF THE DAYS
2. NOT BEING ABLE TO STOP OR CONTROL WORRYING: NOT AT ALL
7. FEELING AFRAID AS IF SOMETHING AWFUL MIGHT HAPPEN: NOT AT ALL
GAD7 TOTAL SCORE: 4
6. BECOMING EASILY ANNOYED OR IRRITABLE: MORE THAN HALF THE DAYS
5. BEING SO RESTLESS THAT IT IS HARD TO SIT STILL: NOT AT ALL
IF YOU CHECKED OFF ANY PROBLEMS ON THIS QUESTIONNAIRE, HOW DIFFICULT HAVE THESE PROBLEMS MADE IT FOR YOU TO DO YOUR WORK, TAKE CARE OF THINGS AT HOME, OR GET ALONG WITH OTHER PEOPLE: SOMEWHAT DIFFICULT
3. WORRYING TOO MUCH ABOUT DIFFERENT THINGS: NOT AT ALL

## 2019-05-30 ASSESSMENT — MIFFLIN-ST. JEOR: SCORE: 2148.99

## 2019-05-30 NOTE — PROGRESS NOTES
SUBJECTIVE: 54 year old female complaining of needing continued refills of vitamins, GERD medication and allergic rhinitis refills for 6 month(s).   The patient describes needs to see her Dermatology specialists, new marcus Marquez, pulmonary specialists Griselda Abreu at [facility], lymphedema PT and care by [facility] general surgery, and neurosurgeon due to thoracic vertebral fracture repair by Mike Castellon MD. Her orthopedists, Gracie Carbone has signed off her case  She reports a recent respiratory set back due to a URI/ now on daytime oxygen and increases nebulization therapies  The patient denies a history of bleeding, GERD or allergy complaints.   Smoking history: No.   Relevant past medical history: positive for psoriasis, hypertension and chronic constipation.    OBJECTIVE: The patient appears healthy, alert, no distress, cooperative, over weight and sitting in wheel chair with portable oxygen.   EARS: negative  NOSE/SINUS: Nares normal. Septum midline. Mucosa normal. No drainage or sinus tenderness.   THROAT: normal   NECK:Neck supple. No adenopathy. Thyroid symmetric, normal size,, Carotids without bruits.   CHEST: Clear  ABD; The abdomen is soft without tenderness, guarding, mass or organomegaly. Bowel sounds are normal. No CVA tenderness or inguinal adenopathy noted.  Protuberant morbid obesity.    CBC: mild decrease in hemoglobin  Multiple labs reviewed in Epic including metabolic panels    ASSESSMENT: (Z79.899) Encounter for long-term (current) use of medications  (primary encounter diagnosis)  Plan: CL AFF HEMOGRAM/PLATE/DIFF ([facility]), VENOUS         COLLECTION            (D50.0) Iron deficiency anemia due to chronic blood loss  Comment: stable  Plan: famotidine (PEPCID) 20 MG tablet, ferrous         sulfate (FEROSUL) 325 (65 Fe) MG tablet,         ascorbic acid (EQL VITAMIN C) 500 MG TABS,         Multiple Vitamins-Minerals (CENTROVITE) TABS,         CL AFF HEMOGRAM/PLATE/DIFF ([facility]), VENOUS          COLLECTION        Refilled/ recheck 6 months    (K21.9) Gastroesophageal reflux disease without esophagitis  Plan: famotidine (PEPCID) 20 MG tablet, Multiple         Vitamins-Minerals (CENTROVITE) TABS, CL AFF         HEMOGRAM/PLATE/DIFF ([facility]), VENOUS COLLECTION        Life style encouraged with continued weight loss goals    (J30.1) Chronic seasonal allergic rhinitis due to pollen  Plan: cetirizine (ZYRTEC) 10 MG tablet        refilled    (K59.03) Drug-induced constipation  Plan: magnesium oxide (MAG-OX) 400 MG tablet, senna         (SM SENNA LAXATIVE) 8.6 MG tablet        Water and fiber    (L40.50) Psoriatic arthropathy (H)  Plan: cetirizine (ZYRTEC) 10 MG tablet, CL AFF         HEMOGRAM/PLATE/DIFF ([facility]), VENOUS COLLECTION        I have reviewed the patient's medical history in detail and updated the computerized patient record.

## 2019-05-30 NOTE — NURSING NOTE
Elyssa is here for med check    Pre-visit Screening:  Immunizations:  up to date  Colonoscopy:  is up to date  Mammogram: is up to date  Asthma Action Test/Plan:  NA sees pulmonary  PHQ9:  Done today  GAD7:  Done tday  Questioned patient about current smoking habits Pt. has never smoked.  Ok to leave detailed message on voice mail for today's visit only Yes, phone # [phone]

## 2019-05-30 NOTE — PATIENT INSTRUCTIONS
Continue multivitamin, folic acid, iron and vitamin C    Water and fiber diet monitoring chronic constipation issues.  Weight loss plans encouraged    Support for this long healing process    Consider eye appointment, dental cleaning, mammogram and colonoscopy as next steps in preventative care.

## 2019-05-31 ASSESSMENT — ANXIETY QUESTIONNAIRES: GAD7 TOTAL SCORE: 4

## 2019-06-03 ENCOUNTER — HOSPITAL ENCOUNTER (OUTPATIENT)
Dept: OCCUPATIONAL THERAPY | Facility: CLINIC | Age: 54
Setting detail: THERAPIES SERIES
End: 2019-06-03
Attending: FAMILY MEDICINE
Payer: COMMERCIAL

## 2019-06-03 PROCEDURE — 97140 MANUAL THERAPY 1/> REGIONS: CPT | Mod: GO | Performed by: OCCUPATIONAL THERAPIST

## 2019-06-04 ENCOUNTER — HOSPITAL ENCOUNTER (OUTPATIENT)
Dept: CARDIAC REHAB | Facility: CLINIC | Age: 54
End: 2019-06-04
Attending: INTERNAL MEDICINE
Payer: COMMERCIAL

## 2019-06-04 PROCEDURE — 40000244 ZZH STATISTIC VISIT PULM REHAB

## 2019-06-04 PROCEDURE — G0239 OTH RESP PROC, GROUP: HCPCS

## 2019-06-05 ENCOUNTER — RECORDS - HEALTHEAST (OUTPATIENT)
Dept: ADMINISTRATIVE | Facility: OTHER | Age: 54
End: 2019-06-05

## 2019-06-05 ENCOUNTER — OFFICE VISIT - HEALTHEAST (OUTPATIENT)
Dept: PULMONOLOGY | Facility: OTHER | Age: 54
End: 2019-06-05

## 2019-06-05 ENCOUNTER — AMBULATORY - HEALTHEAST (OUTPATIENT)
Dept: PULMONOLOGY | Facility: OTHER | Age: 54
End: 2019-06-05

## 2019-06-05 DIAGNOSIS — J45.21 MILD INTERMITTENT ASTHMA WITH EXACERBATION: ICD-10-CM

## 2019-06-05 DIAGNOSIS — R60.0 BILATERAL LEG EDEMA: ICD-10-CM

## 2019-06-05 ASSESSMENT — MIFFLIN-ST. JEOR: SCORE: 2134.45

## 2019-06-06 ENCOUNTER — HOSPITAL ENCOUNTER (OUTPATIENT)
Dept: CARDIAC REHAB | Facility: CLINIC | Age: 54
End: 2019-06-06
Attending: INTERNAL MEDICINE
Payer: COMMERCIAL

## 2019-06-06 PROCEDURE — 40000244 ZZH STATISTIC VISIT PULM REHAB

## 2019-06-06 PROCEDURE — G0237 THERAPEUTIC PROCD STRG ENDUR: HCPCS

## 2019-06-06 PROCEDURE — G0238 OTH RESP PROC, INDIV: HCPCS

## 2019-06-07 ENCOUNTER — HOSPITAL ENCOUNTER (OUTPATIENT)
Dept: OCCUPATIONAL THERAPY | Facility: CLINIC | Age: 54
Setting detail: THERAPIES SERIES
End: 2019-06-07
Attending: FAMILY MEDICINE
Payer: COMMERCIAL

## 2019-06-07 PROCEDURE — 97140 MANUAL THERAPY 1/> REGIONS: CPT | Mod: GO | Performed by: OCCUPATIONAL THERAPIST

## 2019-06-10 ENCOUNTER — COMMUNICATION - HEALTHEAST (OUTPATIENT)
Dept: PULMONOLOGY | Facility: OTHER | Age: 54
End: 2019-06-10

## 2019-06-10 ENCOUNTER — HOSPITAL ENCOUNTER (OUTPATIENT)
Dept: CT IMAGING | Facility: CLINIC | Age: 54
Discharge: HOME | End: 2019-06-10

## 2019-06-10 DIAGNOSIS — S22.072D: ICD-10-CM

## 2019-06-13 ENCOUNTER — HOSPITAL ENCOUNTER (OUTPATIENT)
Dept: CARDIAC REHAB | Facility: CLINIC | Age: 54
End: 2019-06-13
Attending: INTERNAL MEDICINE
Payer: COMMERCIAL

## 2019-06-13 PROCEDURE — G0239 OTH RESP PROC, GROUP: HCPCS

## 2019-06-13 PROCEDURE — 40000244 ZZH STATISTIC VISIT PULM REHAB

## 2019-06-17 ENCOUNTER — HOSPITAL ENCOUNTER (OUTPATIENT)
Dept: OCCUPATIONAL THERAPY | Facility: CLINIC | Age: 54
Setting detail: THERAPIES SERIES
End: 2019-06-17
Attending: FAMILY MEDICINE
Payer: COMMERCIAL

## 2019-06-17 PROCEDURE — 97140 MANUAL THERAPY 1/> REGIONS: CPT | Mod: GO | Performed by: OCCUPATIONAL THERAPIST

## 2019-06-24 ENCOUNTER — HOSPITAL ENCOUNTER (OUTPATIENT)
Dept: OCCUPATIONAL THERAPY | Facility: CLINIC | Age: 54
Setting detail: THERAPIES SERIES
End: 2019-06-24
Attending: FAMILY MEDICINE
Payer: COMMERCIAL

## 2019-06-24 PROCEDURE — 97140 MANUAL THERAPY 1/> REGIONS: CPT | Mod: GO | Performed by: OCCUPATIONAL THERAPIST

## 2019-06-25 ENCOUNTER — HOSPITAL ENCOUNTER (OUTPATIENT)
Dept: CARDIAC REHAB | Facility: CLINIC | Age: 54
End: 2019-06-25
Attending: INTERNAL MEDICINE
Payer: COMMERCIAL

## 2019-06-25 PROCEDURE — G0239 OTH RESP PROC, GROUP: HCPCS

## 2019-06-25 PROCEDURE — 40000244 ZZH STATISTIC VISIT PULM REHAB

## 2019-06-27 ENCOUNTER — HOSPITAL ENCOUNTER (OUTPATIENT)
Dept: CARDIAC REHAB | Facility: CLINIC | Age: 54
End: 2019-06-27
Attending: INTERNAL MEDICINE
Payer: COMMERCIAL

## 2019-06-27 PROCEDURE — G0239 OTH RESP PROC, GROUP: HCPCS

## 2019-06-27 PROCEDURE — 40000244 ZZH STATISTIC VISIT PULM REHAB

## 2019-07-02 ENCOUNTER — HOSPITAL ENCOUNTER (OUTPATIENT)
Dept: CARDIAC REHAB | Facility: CLINIC | Age: 54
End: 2019-07-02
Attending: INTERNAL MEDICINE
Payer: COMMERCIAL

## 2019-07-02 PROCEDURE — G0424 PULMONARY REHAB W EXER: HCPCS

## 2019-07-02 PROCEDURE — 40000244 ZZH STATISTIC VISIT PULM REHAB

## 2019-07-08 ENCOUNTER — HOSPITAL ENCOUNTER (OUTPATIENT)
Dept: OCCUPATIONAL THERAPY | Facility: CLINIC | Age: 54
Setting detail: THERAPIES SERIES
End: 2019-07-08
Attending: FAMILY MEDICINE
Payer: COMMERCIAL

## 2019-07-08 PROCEDURE — 97140 MANUAL THERAPY 1/> REGIONS: CPT | Mod: GO | Performed by: OCCUPATIONAL THERAPIST

## 2019-07-09 ENCOUNTER — HOSPITAL ENCOUNTER (OUTPATIENT)
Dept: CARDIAC REHAB | Facility: CLINIC | Age: 54
End: 2019-07-09
Attending: INTERNAL MEDICINE
Payer: COMMERCIAL

## 2019-07-09 PROCEDURE — G0239 OTH RESP PROC, GROUP: HCPCS

## 2019-07-09 PROCEDURE — 40000244 ZZH STATISTIC VISIT PULM REHAB

## 2019-07-11 ENCOUNTER — HOSPITAL ENCOUNTER (OUTPATIENT)
Dept: CARDIAC REHAB | Facility: CLINIC | Age: 54
End: 2019-07-11
Attending: INTERNAL MEDICINE
Payer: COMMERCIAL

## 2019-07-11 PROCEDURE — G0239 OTH RESP PROC, GROUP: HCPCS

## 2019-07-11 PROCEDURE — 40000244 ZZH STATISTIC VISIT PULM REHAB

## 2019-07-12 ENCOUNTER — HOSPITAL ENCOUNTER (OUTPATIENT)
Dept: OCCUPATIONAL THERAPY | Facility: CLINIC | Age: 54
Setting detail: THERAPIES SERIES
End: 2019-07-12
Attending: FAMILY MEDICINE
Payer: COMMERCIAL

## 2019-07-12 PROCEDURE — 97140 MANUAL THERAPY 1/> REGIONS: CPT | Mod: GO | Performed by: OCCUPATIONAL THERAPIST
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
## 2019-07-17 ENCOUNTER — HOSPITAL ENCOUNTER (OUTPATIENT)
Dept: OCCUPATIONAL THERAPY | Facility: CLINIC | Age: 54
Setting detail: THERAPIES SERIES
End: 2019-07-17
Attending: FAMILY MEDICINE
Payer: COMMERCIAL

## 2019-07-17 PROCEDURE — 97140 MANUAL THERAPY 1/> REGIONS: CPT | Mod: GO | Performed by: OCCUPATIONAL THERAPIST

## 2019-07-18 ENCOUNTER — HOSPITAL ENCOUNTER (OUTPATIENT)
Dept: CARDIAC REHAB | Facility: CLINIC | Age: 54
End: 2019-07-18
Attending: INTERNAL MEDICINE
Payer: COMMERCIAL

## 2019-07-18 PROCEDURE — G0239 OTH RESP PROC, GROUP: HCPCS

## 2019-07-18 PROCEDURE — 40000244 ZZH STATISTIC VISIT PULM REHAB

## 2019-07-22 ENCOUNTER — HOSPITAL ENCOUNTER (OUTPATIENT)
Dept: OCCUPATIONAL THERAPY | Facility: CLINIC | Age: 54
Setting detail: THERAPIES SERIES
End: 2019-07-22
Attending: FAMILY MEDICINE
Payer: COMMERCIAL

## 2019-07-22 PROCEDURE — 97140 MANUAL THERAPY 1/> REGIONS: CPT | Mod: GO | Performed by: OCCUPATIONAL THERAPIST

## 2019-07-25 ENCOUNTER — HOSPITAL ENCOUNTER (OUTPATIENT)
Dept: CARDIAC REHAB | Facility: CLINIC | Age: 54
End: 2019-07-25
Attending: INTERNAL MEDICINE
Payer: COMMERCIAL

## 2019-07-25 PROCEDURE — G0238 OTH RESP PROC, INDIV: HCPCS

## 2019-07-25 PROCEDURE — G0237 THERAPEUTIC PROCD STRG ENDUR: HCPCS

## 2019-07-25 PROCEDURE — 40000244 ZZH STATISTIC VISIT PULM REHAB

## 2019-07-26 ENCOUNTER — HOSPITAL ENCOUNTER (OUTPATIENT)
Dept: OCCUPATIONAL THERAPY | Facility: CLINIC | Age: 54
Setting detail: THERAPIES SERIES
End: 2019-07-26
Attending: FAMILY MEDICINE
Payer: COMMERCIAL

## 2019-07-26 PROCEDURE — 97140 MANUAL THERAPY 1/> REGIONS: CPT | Mod: GO | Performed by: OCCUPATIONAL THERAPIST

## 2019-07-29 ENCOUNTER — HOSPITAL ENCOUNTER (OUTPATIENT)
Dept: OCCUPATIONAL THERAPY | Facility: CLINIC | Age: 54
Setting detail: THERAPIES SERIES
End: 2019-07-29
Attending: FAMILY MEDICINE
Payer: COMMERCIAL

## 2019-07-29 PROCEDURE — 97140 MANUAL THERAPY 1/> REGIONS: CPT | Mod: GO | Performed by: OCCUPATIONAL THERAPIST

## 2019-07-30 ENCOUNTER — TELEPHONE (OUTPATIENT)
Dept: FAMILY MEDICINE | Facility: CLINIC | Age: 54
End: 2019-07-30

## 2019-07-30 NOTE — TELEPHONE ENCOUNTER
Forms from [place] [employer] for pt's daughter to miss work due to taking care of her mother. I put form in your in-basket to review. Should you be signing this or should I fax elsewhere?    Please advise    Thanks, Sandi

## 2019-07-30 NOTE — TELEPHONE ENCOUNTER
I am unable to fill thsi out. Therapy and orders are coming from pulmonology and cardiology. Ask Elyssa who has been directing these orders and care. Will fax to their office.

## 2019-07-31 ENCOUNTER — HOSPITAL ENCOUNTER (OUTPATIENT)
Dept: OCCUPATIONAL THERAPY | Facility: CLINIC | Age: 54
Setting detail: THERAPIES SERIES
End: 2019-07-31
Attending: FAMILY MEDICINE
Payer: COMMERCIAL

## 2019-07-31 PROCEDURE — 97140 MANUAL THERAPY 1/> REGIONS: CPT | Mod: GO | Performed by: OCCUPATIONAL THERAPIST

## 2019-08-07 ENCOUNTER — HOSPITAL ENCOUNTER (OUTPATIENT)
Dept: OCCUPATIONAL THERAPY | Facility: CLINIC | Age: 54
Setting detail: THERAPIES SERIES
End: 2019-08-07
Attending: FAMILY MEDICINE
Payer: COMMERCIAL

## 2019-08-07 ENCOUNTER — COMMUNICATION - HEALTHEAST (OUTPATIENT)
Dept: PULMONOLOGY | Facility: OTHER | Age: 54
End: 2019-08-07

## 2019-08-07 PROCEDURE — 97140 MANUAL THERAPY 1/> REGIONS: CPT | Mod: GO | Performed by: OCCUPATIONAL THERAPIST

## 2019-08-09 ENCOUNTER — HOSPITAL ENCOUNTER (OUTPATIENT)
Dept: OCCUPATIONAL THERAPY | Facility: CLINIC | Age: 54
Setting detail: THERAPIES SERIES
End: 2019-08-09
Attending: FAMILY MEDICINE
Payer: COMMERCIAL

## 2019-08-09 PROCEDURE — 97140 MANUAL THERAPY 1/> REGIONS: CPT | Mod: GO | Performed by: OCCUPATIONAL THERAPIST

## 2019-08-13 ENCOUNTER — HOSPITAL ENCOUNTER (OUTPATIENT)
Dept: OCCUPATIONAL THERAPY | Facility: CLINIC | Age: 54
Setting detail: THERAPIES SERIES
End: 2019-08-13
Attending: FAMILY MEDICINE
Payer: COMMERCIAL

## 2019-08-13 PROCEDURE — 97140 MANUAL THERAPY 1/> REGIONS: CPT | Mod: GO | Performed by: OCCUPATIONAL THERAPIST

## 2019-08-14 ENCOUNTER — ALLIED HEALTH/NURSE VISIT (OUTPATIENT)
Dept: CARDIAC REHAB | Facility: CLINIC | Age: 54
End: 2019-08-14

## 2019-08-16 ENCOUNTER — HOSPITAL ENCOUNTER (OUTPATIENT)
Dept: OCCUPATIONAL THERAPY | Facility: CLINIC | Age: 54
Setting detail: THERAPIES SERIES
End: 2019-08-16
Attending: FAMILY MEDICINE
Payer: COMMERCIAL

## 2019-08-16 PROCEDURE — 97140 MANUAL THERAPY 1/> REGIONS: CPT | Mod: GO | Performed by: OCCUPATIONAL THERAPIST

## 2019-08-19 ENCOUNTER — HOSPITAL ENCOUNTER (OUTPATIENT)
Dept: OCCUPATIONAL THERAPY | Facility: CLINIC | Age: 54
Setting detail: THERAPIES SERIES
End: 2019-08-19
Attending: FAMILY MEDICINE
Payer: COMMERCIAL

## 2019-08-19 PROCEDURE — 97140 MANUAL THERAPY 1/> REGIONS: CPT | Mod: GO | Performed by: OCCUPATIONAL THERAPIST

## 2019-08-21 ENCOUNTER — HOSPITAL ENCOUNTER (OUTPATIENT)
Dept: OCCUPATIONAL THERAPY | Facility: CLINIC | Age: 54
Setting detail: THERAPIES SERIES
End: 2019-08-21
Attending: FAMILY MEDICINE
Payer: COMMERCIAL

## 2019-08-21 PROCEDURE — 97140 MANUAL THERAPY 1/> REGIONS: CPT | Mod: GO | Performed by: OCCUPATIONAL THERAPIST

## 2019-08-23 ENCOUNTER — OFFICE VISIT - HEALTHEAST (OUTPATIENT)
Dept: SLEEP MEDICINE | Facility: CLINIC | Age: 54
End: 2019-08-23

## 2019-08-23 DIAGNOSIS — G47.33 OSA (OBSTRUCTIVE SLEEP APNEA): ICD-10-CM

## 2019-08-23 ASSESSMENT — MIFFLIN-ST. JEOR: SCORE: 2120.84

## 2019-08-26 ENCOUNTER — HOSPITAL ENCOUNTER (OUTPATIENT)
Dept: OCCUPATIONAL THERAPY | Facility: CLINIC | Age: 54
Setting detail: THERAPIES SERIES
End: 2019-08-26
Attending: FAMILY MEDICINE
Payer: COMMERCIAL

## 2019-08-26 PROCEDURE — 97140 MANUAL THERAPY 1/> REGIONS: CPT | Mod: GO | Performed by: OCCUPATIONAL THERAPIST

## 2019-08-28 ENCOUNTER — HOSPITAL ENCOUNTER (OUTPATIENT)
Dept: OCCUPATIONAL THERAPY | Facility: CLINIC | Age: 54
Setting detail: THERAPIES SERIES
End: 2019-08-28
Attending: FAMILY MEDICINE
Payer: COMMERCIAL

## 2019-08-28 PROCEDURE — 97140 MANUAL THERAPY 1/> REGIONS: CPT | Mod: GO | Performed by: OCCUPATIONAL THERAPIST

## 2019-09-04 ENCOUNTER — OFFICE VISIT - HEALTHEAST (OUTPATIENT)
Dept: PULMONOLOGY | Facility: OTHER | Age: 54
End: 2019-09-04

## 2019-09-04 DIAGNOSIS — J45.21 MILD INTERMITTENT ASTHMA WITH EXACERBATION: ICD-10-CM

## 2019-09-04 DIAGNOSIS — R60.0 BILATERAL LEG EDEMA: ICD-10-CM

## 2019-09-04 DIAGNOSIS — I50.32 CHRONIC HEART FAILURE WITH PRESERVED EJECTION FRACTION (H): ICD-10-CM

## 2019-09-04 DIAGNOSIS — E66.01 MORBID OBESITY WITH BMI OF 60.0-69.9, ADULT (H): ICD-10-CM

## 2019-09-04 DIAGNOSIS — R09.02 HYPOXIA: ICD-10-CM

## 2019-09-04 DIAGNOSIS — J96.12 CHRONIC HYPERCAPNIC RESPIRATORY FAILURE (H): ICD-10-CM

## 2019-09-04 DIAGNOSIS — I89.0 LYMPH EDEMA: ICD-10-CM

## 2019-09-18 ENCOUNTER — HOSPITAL ENCOUNTER (OUTPATIENT)
Dept: OCCUPATIONAL THERAPY | Facility: CLINIC | Age: 54
Setting detail: THERAPIES SERIES
End: 2019-09-18
Attending: FAMILY MEDICINE
Payer: COMMERCIAL

## 2019-09-18 PROCEDURE — 97140 MANUAL THERAPY 1/> REGIONS: CPT | Mod: GO | Performed by: OCCUPATIONAL THERAPIST

## 2019-09-20 ENCOUNTER — HOSPITAL ENCOUNTER (OUTPATIENT)
Dept: OCCUPATIONAL THERAPY | Facility: CLINIC | Age: 54
Setting detail: THERAPIES SERIES
End: 2019-09-20
Attending: FAMILY MEDICINE
Payer: COMMERCIAL

## 2019-09-20 PROCEDURE — 97140 MANUAL THERAPY 1/> REGIONS: CPT | Mod: GO | Performed by: OCCUPATIONAL THERAPIST

## 2019-09-24 ENCOUNTER — AMBULATORY - HEALTHEAST (OUTPATIENT)
Dept: PULMONOLOGY | Facility: OTHER | Age: 54
End: 2019-09-24

## 2019-09-24 ENCOUNTER — HOSPITAL ENCOUNTER (OUTPATIENT)
Dept: OCCUPATIONAL THERAPY | Facility: CLINIC | Age: 54
Setting detail: THERAPIES SERIES
End: 2019-09-24
Attending: FAMILY MEDICINE
Payer: COMMERCIAL

## 2019-09-24 PROCEDURE — 97140 MANUAL THERAPY 1/> REGIONS: CPT | Mod: GO | Performed by: OCCUPATIONAL THERAPIST

## 2019-09-27 ENCOUNTER — HEALTH MAINTENANCE LETTER (OUTPATIENT)
Age: 54
End: 2019-09-27

## 2019-10-01 ENCOUNTER — HOSPITAL ENCOUNTER (OUTPATIENT)
Dept: OCCUPATIONAL THERAPY | Facility: CLINIC | Age: 54
Setting detail: THERAPIES SERIES
End: 2019-10-01
Attending: FAMILY MEDICINE
Payer: COMMERCIAL

## 2019-10-01 PROCEDURE — 97140 MANUAL THERAPY 1/> REGIONS: CPT | Mod: GO | Performed by: OCCUPATIONAL THERAPIST

## 2019-10-03 ENCOUNTER — AMBULATORY - HEALTHEAST (OUTPATIENT)
Dept: PULMONOLOGY | Facility: OTHER | Age: 54
End: 2019-10-03

## 2019-10-04 ENCOUNTER — HOSPITAL ENCOUNTER (OUTPATIENT)
Dept: OCCUPATIONAL THERAPY | Facility: CLINIC | Age: 54
Setting detail: THERAPIES SERIES
End: 2019-10-04
Attending: FAMILY MEDICINE
Payer: COMMERCIAL

## 2019-10-04 PROCEDURE — 97140 MANUAL THERAPY 1/> REGIONS: CPT | Mod: GO | Performed by: OCCUPATIONAL THERAPIST

## 2019-10-08 ENCOUNTER — HOSPITAL ENCOUNTER (OUTPATIENT)
Dept: OCCUPATIONAL THERAPY | Facility: CLINIC | Age: 54
Setting detail: THERAPIES SERIES
End: 2019-10-08
Attending: FAMILY MEDICINE
Payer: COMMERCIAL

## 2019-10-08 PROCEDURE — 97140 MANUAL THERAPY 1/> REGIONS: CPT | Mod: GO | Performed by: OCCUPATIONAL THERAPIST

## 2019-10-15 ENCOUNTER — HOSPITAL ENCOUNTER (OUTPATIENT)
Dept: OCCUPATIONAL THERAPY | Facility: CLINIC | Age: 54
Setting detail: THERAPIES SERIES
End: 2019-10-15
Attending: FAMILY MEDICINE
Payer: COMMERCIAL

## 2019-10-15 PROCEDURE — 97140 MANUAL THERAPY 1/> REGIONS: CPT | Mod: GO | Performed by: OCCUPATIONAL THERAPIST

## 2019-10-17 ENCOUNTER — COMMUNICATION - HEALTHEAST (OUTPATIENT)
Dept: SLEEP MEDICINE | Facility: CLINIC | Age: 54
End: 2019-10-17

## 2019-10-22 ENCOUNTER — HOSPITAL ENCOUNTER (OUTPATIENT)
Dept: OCCUPATIONAL THERAPY | Facility: CLINIC | Age: 54
Setting detail: THERAPIES SERIES
End: 2019-10-22
Attending: FAMILY MEDICINE
Payer: COMMERCIAL

## 2019-10-22 PROCEDURE — 97140 MANUAL THERAPY 1/> REGIONS: CPT | Mod: GO | Performed by: OCCUPATIONAL THERAPIST

## 2019-10-29 ENCOUNTER — HOSPITAL ENCOUNTER (OUTPATIENT)
Dept: OCCUPATIONAL THERAPY | Facility: CLINIC | Age: 54
Setting detail: THERAPIES SERIES
End: 2019-10-29
Attending: FAMILY MEDICINE
Payer: COMMERCIAL

## 2019-10-29 DIAGNOSIS — D50.0 IRON DEFICIENCY ANEMIA DUE TO CHRONIC BLOOD LOSS: ICD-10-CM

## 2019-10-29 PROCEDURE — 97140 MANUAL THERAPY 1/> REGIONS: CPT | Mod: GO | Performed by: OCCUPATIONAL THERAPIST

## 2019-10-29 RX ORDER — ASCORBIC ACID 500 MG
TABLET ORAL
Qty: 90 TABLET | Refills: 0 | OUTPATIENT
Start: 2019-10-29

## 2019-10-29 NOTE — TELEPHONE ENCOUNTER
Refused Prescriptions:                       Disp   Refills    ascorbic acid (EQL VITAMIN C) 500 MG TABS  90 tab*0        Sig: TAKE 1 TABLET BY MOUTH ONE TIME DAILY with iron  Refused By: CYN GRANT  Reason for Refusal: Appt required, please call patient    Pt due for a non fasting six month ov  No future appts  Cyn  [phone] (home) [phone] (work)

## 2019-11-05 ENCOUNTER — HOSPITAL ENCOUNTER (OUTPATIENT)
Dept: OCCUPATIONAL THERAPY | Facility: CLINIC | Age: 54
Setting detail: THERAPIES SERIES
End: 2019-11-05
Attending: FAMILY MEDICINE
Payer: COMMERCIAL

## 2019-11-05 PROCEDURE — 97140 MANUAL THERAPY 1/> REGIONS: CPT | Mod: GO | Performed by: OCCUPATIONAL THERAPIST

## 2019-11-08 ENCOUNTER — HOSPITAL ENCOUNTER (OUTPATIENT)
Dept: OCCUPATIONAL THERAPY | Facility: CLINIC | Age: 54
Setting detail: THERAPIES SERIES
End: 2019-11-08
Attending: FAMILY MEDICINE
Payer: COMMERCIAL

## 2019-11-08 PROCEDURE — 97140 MANUAL THERAPY 1/> REGIONS: CPT | Mod: GO | Performed by: OCCUPATIONAL THERAPIST

## 2019-11-12 ENCOUNTER — HOSPITAL ENCOUNTER (OUTPATIENT)
Dept: OCCUPATIONAL THERAPY | Facility: CLINIC | Age: 54
Setting detail: THERAPIES SERIES
End: 2019-11-12
Attending: FAMILY MEDICINE
Payer: COMMERCIAL

## 2019-11-12 PROCEDURE — 97140 MANUAL THERAPY 1/> REGIONS: CPT | Mod: GO | Performed by: OCCUPATIONAL THERAPIST

## 2019-11-13 ENCOUNTER — TELEPHONE (OUTPATIENT)
Dept: FAMILY MEDICINE | Facility: CLINIC | Age: 54
End: 2019-11-13

## 2019-11-13 NOTE — TELEPHONE ENCOUNTER
[facility] sent an order form to be reviewed and signed by PCP. I will put in your bin.      Thanks, Rain

## 2019-11-15 ENCOUNTER — HOSPITAL ENCOUNTER (OUTPATIENT)
Dept: OCCUPATIONAL THERAPY | Facility: CLINIC | Age: 54
Setting detail: THERAPIES SERIES
End: 2019-11-15
Attending: FAMILY MEDICINE
Payer: COMMERCIAL

## 2019-11-15 PROCEDURE — 97140 MANUAL THERAPY 1/> REGIONS: CPT | Mod: GO | Performed by: OCCUPATIONAL THERAPIST

## 2019-11-19 ENCOUNTER — HOSPITAL ENCOUNTER (OUTPATIENT)
Dept: OCCUPATIONAL THERAPY | Facility: CLINIC | Age: 54
Setting detail: THERAPIES SERIES
End: 2019-11-19
Attending: FAMILY MEDICINE
Payer: COMMERCIAL

## 2019-11-19 PROCEDURE — 97140 MANUAL THERAPY 1/> REGIONS: CPT | Mod: GO | Performed by: OCCUPATIONAL THERAPIST

## 2019-11-23 DIAGNOSIS — K21.9 GASTROESOPHAGEAL REFLUX DISEASE WITHOUT ESOPHAGITIS: ICD-10-CM

## 2019-11-23 DIAGNOSIS — D50.0 IRON DEFICIENCY ANEMIA DUE TO CHRONIC BLOOD LOSS: ICD-10-CM

## 2019-11-25 ENCOUNTER — HOSPITAL ENCOUNTER (OUTPATIENT)
Dept: OCCUPATIONAL THERAPY | Facility: CLINIC | Age: 54
Setting detail: THERAPIES SERIES
End: 2019-11-25
Attending: FAMILY MEDICINE
Payer: COMMERCIAL

## 2019-11-25 PROCEDURE — 97140 MANUAL THERAPY 1/> REGIONS: CPT | Mod: GO | Performed by: OCCUPATIONAL THERAPIST

## 2019-11-25 RX ORDER — MULTIVITAMIN/IRON/FOLIC ACID 18MG-0.4MG
TABLET ORAL
Qty: 90 TABLET | Refills: 0 | Status: SHIPPED | OUTPATIENT
Start: 2019-11-25 | End: 2020-01-22

## 2019-11-25 NOTE — TELEPHONE ENCOUNTER
Pending Prescriptions:                       Disp   Refills    Multiple Vitamins-Minerals (CENTROVITE) T*90 tab*             Sig: TAKE 1 TABLET BY MOUTH ONE TIME DAILY     Per notes on 5-2019 rtc in 6 months  Pt has an appt on 12-4-2019  Fax and close encounter  Cyn  [phone] (home) [phone] (work)

## 2019-12-10 ENCOUNTER — OFFICE VISIT - HEALTHEAST (OUTPATIENT)
Dept: PULMONOLOGY | Facility: OTHER | Age: 54
End: 2019-12-10

## 2019-12-10 ENCOUNTER — HOSPITAL ENCOUNTER (OUTPATIENT)
Dept: OCCUPATIONAL THERAPY | Facility: CLINIC | Age: 54
Setting detail: THERAPIES SERIES
End: 2019-12-10
Attending: FAMILY MEDICINE
Payer: COMMERCIAL

## 2019-12-10 DIAGNOSIS — I50.32 CHRONIC HEART FAILURE WITH PRESERVED EJECTION FRACTION (H): ICD-10-CM

## 2019-12-10 DIAGNOSIS — E66.01 MORBID OBESITY WITH BMI OF 60.0-69.9, ADULT (H): ICD-10-CM

## 2019-12-10 DIAGNOSIS — J96.12 CHRONIC HYPERCAPNIC RESPIRATORY FAILURE (H): ICD-10-CM

## 2019-12-10 DIAGNOSIS — J45.21 MILD INTERMITTENT ASTHMA WITH EXACERBATION: ICD-10-CM

## 2019-12-10 DIAGNOSIS — J96.02 ACUTE RESPIRATORY FAILURE WITH HYPOXIA AND HYPERCAPNIA (H): ICD-10-CM

## 2019-12-10 DIAGNOSIS — J96.01 ACUTE RESPIRATORY FAILURE WITH HYPOXIA AND HYPERCAPNIA (H): ICD-10-CM

## 2019-12-10 LAB
ANION GAP SERPL CALCULATED.3IONS-SCNC: 11 MMOL/L (ref 5–18)
BUN SERPL-MCNC: 23 MG/DL (ref 8–22)
CALCIUM SERPL-MCNC: 9.8 MG/DL (ref 8.5–10.5)
CHLORIDE BLD-SCNC: 90 MMOL/L (ref 98–107)
CO2 SERPL-SCNC: 37 MMOL/L (ref 22–31)
CREAT SERPL-MCNC: 1.06 MG/DL (ref 0.6–1.1)
GFR SERPL CREATININE-BSD FRML MDRD: 54 ML/MIN/1.73M2
GLUCOSE BLD-MCNC: 123 MG/DL (ref 70–125)
POTASSIUM BLD-SCNC: 3.8 MMOL/L (ref 3.5–5)
SODIUM SERPL-SCNC: 138 MMOL/L (ref 136–145)

## 2019-12-10 PROCEDURE — 97140 MANUAL THERAPY 1/> REGIONS: CPT | Mod: GO | Performed by: OCCUPATIONAL THERAPIST

## 2019-12-11 ENCOUNTER — COMMUNICATION - HEALTHEAST (OUTPATIENT)
Dept: PULMONOLOGY | Facility: OTHER | Age: 54
End: 2019-12-11

## 2019-12-11 ENCOUNTER — AMBULATORY - HEALTHEAST (OUTPATIENT)
Dept: PULMONOLOGY | Facility: OTHER | Age: 54
End: 2019-12-11

## 2019-12-11 DIAGNOSIS — J45.21 MILD INTERMITTENT ASTHMA WITH EXACERBATION: ICD-10-CM

## 2019-12-17 ENCOUNTER — HOSPITAL ENCOUNTER (OUTPATIENT)
Dept: OCCUPATIONAL THERAPY | Facility: CLINIC | Age: 54
Setting detail: THERAPIES SERIES
End: 2019-12-17
Attending: FAMILY MEDICINE
Payer: COMMERCIAL

## 2019-12-17 PROCEDURE — 97140 MANUAL THERAPY 1/> REGIONS: CPT | Mod: GO | Performed by: OCCUPATIONAL THERAPIST

## 2019-12-23 ENCOUNTER — COMMUNICATION - HEALTHEAST (OUTPATIENT)
Dept: PULMONOLOGY | Facility: OTHER | Age: 54
End: 2019-12-23

## 2019-12-23 DIAGNOSIS — J98.4 RESTRICTIVE LUNG DISEASE: ICD-10-CM

## 2019-12-27 ENCOUNTER — COMMUNICATION - HEALTHEAST (OUTPATIENT)
Dept: PULMONOLOGY | Facility: OTHER | Age: 54
End: 2019-12-27

## 2019-12-27 DIAGNOSIS — J45.21 MILD INTERMITTENT ASTHMA WITH EXACERBATION: ICD-10-CM

## 2019-12-29 DIAGNOSIS — K59.03 DRUG-INDUCED CONSTIPATION: ICD-10-CM

## 2019-12-29 DIAGNOSIS — J30.1 CHRONIC SEASONAL ALLERGIC RHINITIS DUE TO POLLEN: ICD-10-CM

## 2019-12-29 DIAGNOSIS — D50.0 IRON DEFICIENCY ANEMIA DUE TO CHRONIC BLOOD LOSS: ICD-10-CM

## 2019-12-29 DIAGNOSIS — L40.50 PSORIATIC ARTHROPATHY (H): ICD-10-CM

## 2019-12-30 RX ORDER — MAGNESIUM OXIDE 400 MG/1
TABLET ORAL
Qty: 180 TABLET | Refills: 0 | OUTPATIENT
Start: 2019-12-30

## 2019-12-30 RX ORDER — FERROUS SULFATE 325(65) MG
TABLET ORAL
Qty: 90 TABLET | Refills: 0 | OUTPATIENT
Start: 2019-12-30

## 2019-12-30 RX ORDER — SENNOSIDES 8.6 MG/1
TABLET ORAL
Qty: 90 TABLET | Refills: 0 | OUTPATIENT
Start: 2019-12-30

## 2019-12-30 RX ORDER — CETIRIZINE HYDROCHLORIDE 10 MG/1
TABLET ORAL
Qty: 90 TABLET | Refills: 0 | OUTPATIENT
Start: 2019-12-30

## 2019-12-30 NOTE — TELEPHONE ENCOUNTER
Refused Prescriptions:                       Disp   Refills    cetirizine (ZYRTEC) 10 MG tablet [Pharmacy*90 tab*0        Sig: TAKE 1 TABLET BY MOUTH ONE TIME DAILY   Refused By: CYN GRANT  Reason for Refusal: OTHER    SM SENNA LAXATIVE 8.6 MG tablet [Pharmacy *90 tab*0        Sig: TAKE 1 TABLET BY MOUTH ONE TIME DAILY   Refused By: CYN GRANT  Reason for Refusal: OTHER    FEROSUL 325 (65 Fe) MG tablet [Pharmacy Me*90 tab*0        Sig: TAKE 1 TABLET BY MOUTH ONE TIME DAILY   Refused By: CYN GRANT  Reason for Refusal: OTHER    magnesium oxide (MAG-OX) 400 MG tablet [Ph*180 ta*0        Sig: TAKE ONE TABLET BY MOUTH THREE TIMES DAILY   Refused By: CYN GRANT  Reason for Refusal: OTHER    Pt has an appt  Today   Cyn  [phone] (home) [phone] (work)

## 2019-12-31 ENCOUNTER — HOSPITAL ENCOUNTER (OUTPATIENT)
Dept: OCCUPATIONAL THERAPY | Facility: CLINIC | Age: 54
Setting detail: THERAPIES SERIES
End: 2019-12-31
Attending: FAMILY MEDICINE
Payer: COMMERCIAL

## 2019-12-31 PROCEDURE — 97140 MANUAL THERAPY 1/> REGIONS: CPT | Mod: GO | Performed by: OCCUPATIONAL THERAPIST

## 2020-01-03 ENCOUNTER — HOSPITAL ENCOUNTER (OUTPATIENT)
Dept: OCCUPATIONAL THERAPY | Facility: CLINIC | Age: 55
Setting detail: THERAPIES SERIES
End: 2020-01-03
Attending: FAMILY MEDICINE
Payer: COMMERCIAL

## 2020-01-03 PROCEDURE — 97140 MANUAL THERAPY 1/> REGIONS: CPT | Mod: GO | Performed by: OCCUPATIONAL THERAPIST

## 2020-01-07 ENCOUNTER — HOSPITAL ENCOUNTER (OUTPATIENT)
Dept: OCCUPATIONAL THERAPY | Facility: CLINIC | Age: 55
Setting detail: THERAPIES SERIES
End: 2020-01-07
Attending: FAMILY MEDICINE
Payer: COMMERCIAL

## 2020-01-07 PROCEDURE — 97140 MANUAL THERAPY 1/> REGIONS: CPT | Mod: GO | Performed by: OCCUPATIONAL THERAPIST

## 2020-01-10 ENCOUNTER — HOSPITAL ENCOUNTER (OUTPATIENT)
Dept: OCCUPATIONAL THERAPY | Facility: CLINIC | Age: 55
Setting detail: THERAPIES SERIES
End: 2020-01-10
Attending: FAMILY MEDICINE
Payer: COMMERCIAL

## 2020-01-10 PROCEDURE — 97140 MANUAL THERAPY 1/> REGIONS: CPT | Mod: GO | Performed by: OCCUPATIONAL THERAPIST

## 2020-01-14 ENCOUNTER — HOSPITAL ENCOUNTER (OUTPATIENT)
Dept: OCCUPATIONAL THERAPY | Facility: CLINIC | Age: 55
Setting detail: THERAPIES SERIES
End: 2020-01-14
Attending: FAMILY MEDICINE
Payer: COMMERCIAL

## 2020-01-14 PROCEDURE — 97140 MANUAL THERAPY 1/> REGIONS: CPT | Mod: GO | Performed by: OCCUPATIONAL THERAPIST

## 2020-01-21 ENCOUNTER — HOSPITAL ENCOUNTER (OUTPATIENT)
Dept: OCCUPATIONAL THERAPY | Facility: CLINIC | Age: 55
Setting detail: THERAPIES SERIES
End: 2020-01-21
Attending: FAMILY MEDICINE
Payer: COMMERCIAL

## 2020-01-21 PROCEDURE — 97140 MANUAL THERAPY 1/> REGIONS: CPT | Mod: GO | Performed by: OCCUPATIONAL THERAPIST

## 2020-01-22 ENCOUNTER — TELEPHONE (OUTPATIENT)
Dept: FAMILY MEDICINE | Facility: CLINIC | Age: 55
End: 2020-01-22

## 2020-01-22 ENCOUNTER — HOSPITAL ENCOUNTER (OUTPATIENT)
Dept: OCCUPATIONAL THERAPY | Facility: CLINIC | Age: 55
Setting detail: THERAPIES SERIES
End: 2020-01-22
Attending: FAMILY MEDICINE
Payer: COMMERCIAL

## 2020-01-22 ENCOUNTER — OFFICE VISIT (OUTPATIENT)
Dept: FAMILY MEDICINE | Facility: CLINIC | Age: 55
End: 2020-01-22

## 2020-01-22 VITALS
WEIGHT: 293 LBS | BODY MASS INDEX: 70.54 KG/M2 | RESPIRATION RATE: 20 BRPM | DIASTOLIC BLOOD PRESSURE: 61 MMHG | TEMPERATURE: 98.7 F | SYSTOLIC BLOOD PRESSURE: 95 MMHG | HEART RATE: 90 BPM

## 2020-01-22 DIAGNOSIS — Z11.59 NEED FOR HEPATITIS C SCREENING TEST: ICD-10-CM

## 2020-01-22 DIAGNOSIS — D50.0 IRON DEFICIENCY ANEMIA DUE TO CHRONIC BLOOD LOSS: ICD-10-CM

## 2020-01-22 DIAGNOSIS — Z79.899 ENCOUNTER FOR LONG-TERM (CURRENT) USE OF MEDICATIONS: ICD-10-CM

## 2020-01-22 DIAGNOSIS — K59.03 DRUG-INDUCED CONSTIPATION: ICD-10-CM

## 2020-01-22 DIAGNOSIS — I10 ESSENTIAL HYPERTENSION, BENIGN: ICD-10-CM

## 2020-01-22 DIAGNOSIS — K21.9 GASTROESOPHAGEAL REFLUX DISEASE WITHOUT ESOPHAGITIS: ICD-10-CM

## 2020-01-22 DIAGNOSIS — E66.01 MORBID OBESITY WITH BMI OF 60.0-69.9, ADULT (H): ICD-10-CM

## 2020-01-22 DIAGNOSIS — L40.50 PSORIATIC ARTHROPATHY (H): ICD-10-CM

## 2020-01-22 DIAGNOSIS — R73.09 ABNORMAL GLUCOSE LEVEL: Primary | ICD-10-CM

## 2020-01-22 DIAGNOSIS — F32.5 MAJOR DEPRESSIVE DISORDER WITH SINGLE EPISODE, IN FULL REMISSION (H): Primary | ICD-10-CM

## 2020-01-22 DIAGNOSIS — J30.1 CHRONIC SEASONAL ALLERGIC RHINITIS DUE TO POLLEN: ICD-10-CM

## 2020-01-22 LAB
% GRANULOCYTES: 74.4 %
ALBUMIN SERPL-MCNC: 3.8 G/DL (ref 3.6–5.1)
ALBUMIN/GLOB SERPL: 0.9 {RATIO} (ref 1–2.5)
ALP SERPL-CCNC: 71 U/L (ref 33–130)
ALT 1742-6: 27 U/L (ref 0–32)
AST 1920-8: 32 U/L (ref 0–35)
BILIRUB SERPL-MCNC: 0.7 MG/DL (ref 0.2–1.2)
BUN SERPL-MCNC: 32 MG/DL (ref 7–25)
BUN/CREATININE RATIO: 24.1 (ref 6–22)
CALCIUM SERPL-MCNC: 9.6 MG/DL (ref 8.6–10.3)
CHLORIDE SERPLBLD-SCNC: 90.1 MMOL/L (ref 98–110)
CO2 SERPL-SCNC: 41.5 MMOL/L (ref 20–32)
CREAT SERPL-MCNC: 1.33 MG/DL (ref 0.7–1.18)
GFR SERPL CREATININE-BSD FRML MDRD: 44 ML/MIN/1.73M2
GLOBULIN, CALCULATED - QUEST: 4.1 (ref 1.9–3.7)
GLUCOSE SERPL-MCNC: 236 MG/DL (ref 60–99)
HBA1C MFR BLD: 9.1 % (ref 4–7)
HCT VFR BLD AUTO: 40 % (ref 35–47)
HEMOGLOBIN: 12.5 G/DL (ref 11.7–15.7)
LYMPHOCYTES NFR BLD AUTO: 19.5 %
MCH RBC QN AUTO: 27.7 PG (ref 26–33)
MCHC RBC AUTO-ENTMCNC: 31.3 G/DL (ref 31–36)
MCV RBC AUTO: 88.4 FL (ref 78–100)
MONOCYTES NFR BLD AUTO: 6.1 %
PLATELET COUNT - QUEST: 267 10^9/L (ref 150–375)
POTASSIUM SERPL-SCNC: 3.53 MMOL/L (ref 3.5–5.3)
PROT SERPL-MCNC: 7.9 G/DL (ref 6.1–8.1)
RBC # BLD AUTO: 4.52 10*12/L (ref 3.8–5.2)
SODIUM SERPL-SCNC: 138.1 MMOL/L (ref 135–146)
WBC # BLD AUTO: 9.2 10*9/L (ref 4–11)

## 2020-01-22 PROCEDURE — 97140 MANUAL THERAPY 1/> REGIONS: CPT | Mod: GO | Performed by: OCCUPATIONAL THERAPIST

## 2020-01-22 PROCEDURE — 85025 COMPLETE CBC W/AUTO DIFF WBC: CPT | Performed by: FAMILY MEDICINE

## 2020-01-22 PROCEDURE — 99214 OFFICE O/P EST MOD 30 MIN: CPT | Performed by: FAMILY MEDICINE

## 2020-01-22 PROCEDURE — 86803 HEPATITIS C AB TEST: CPT | Mod: 90 | Performed by: FAMILY MEDICINE

## 2020-01-22 PROCEDURE — 36415 COLL VENOUS BLD VENIPUNCTURE: CPT | Performed by: FAMILY MEDICINE

## 2020-01-22 PROCEDURE — 83036 HEMOGLOBIN GLYCOSYLATED A1C: CPT | Performed by: FAMILY MEDICINE

## 2020-01-22 PROCEDURE — 80053 COMPREHEN METABOLIC PANEL: CPT | Performed by: FAMILY MEDICINE

## 2020-01-22 RX ORDER — LOSARTAN POTASSIUM 50 MG/1
50 TABLET ORAL DAILY
Qty: 90 TABLET | Refills: 1 | Status: ON HOLD | OUTPATIENT
Start: 2020-01-22 | End: 2020-03-09

## 2020-01-22 RX ORDER — FERROUS SULFATE 325(65) MG
TABLET ORAL
Qty: 90 TABLET | Refills: 3 | Status: SHIPPED | OUTPATIENT
Start: 2020-01-22 | End: 2021-03-10

## 2020-01-22 RX ORDER — METOLAZONE 2.5 MG/1
2.5 TABLET ORAL DAILY
Status: ON HOLD | COMMUNITY
Start: 2019-09-04 | End: 2020-03-08

## 2020-01-22 RX ORDER — FOLIC ACID 1 MG/1
1 TABLET ORAL DAILY
Refills: 0 | COMMUNITY
Start: 2020-01-22

## 2020-01-22 RX ORDER — MAGNESIUM OXIDE 400 MG/1
400 TABLET ORAL DAILY
Qty: 90 TABLET | Refills: 3 | Status: SHIPPED | OUTPATIENT
Start: 2020-01-22 | End: 2021-03-10

## 2020-01-22 RX ORDER — SENNOSIDES A AND B 8.6 MG/1
1 TABLET, FILM COATED ORAL DAILY
Qty: 90 TABLET | Refills: 3 | Status: SHIPPED | OUTPATIENT
Start: 2020-01-22 | End: 2022-03-23

## 2020-01-22 RX ORDER — FLUTICASONE PROPIONATE 50 MCG
1 SPRAY, SUSPENSION (ML) NASAL DAILY
Qty: 16 G | Refills: 11 | Status: SHIPPED | OUTPATIENT
Start: 2020-01-22 | End: 2021-03-10

## 2020-01-22 RX ORDER — FAMOTIDINE 20 MG/1
20 TABLET, FILM COATED ORAL 2 TIMES DAILY
Qty: 180 TABLET | Refills: 1 | Status: SHIPPED | OUTPATIENT
Start: 2020-01-22 | End: 2020-08-14

## 2020-01-22 RX ORDER — CETIRIZINE HYDROCHLORIDE 10 MG/1
TABLET ORAL
Qty: 90 TABLET | Refills: 3 | Status: SHIPPED | OUTPATIENT
Start: 2020-01-22 | End: 2021-03-10

## 2020-01-22 RX ORDER — MULTIVITAMIN/IRON/FOLIC ACID 18MG-0.4MG
1 TABLET ORAL DAILY
Qty: 90 TABLET | Refills: 3 | Status: SHIPPED | OUTPATIENT
Start: 2020-01-22 | End: 2021-03-10

## 2020-01-22 RX ORDER — ASCORBIC ACID 500 MG
TABLET ORAL
Qty: 90 TABLET | Refills: 3 | Status: SHIPPED | OUTPATIENT
Start: 2020-01-22 | End: 2024-05-07

## 2020-01-22 RX ORDER — LOSARTAN POTASSIUM 100 MG/1
100 TABLET ORAL DAILY
Qty: 90 TABLET | Refills: 3 | Status: CANCELLED | OUTPATIENT
Start: 2020-01-22

## 2020-01-22 RX ORDER — SERTRALINE HYDROCHLORIDE 100 MG/1
TABLET, FILM COATED ORAL
Qty: 90 TABLET | Refills: 3 | Status: SHIPPED | OUTPATIENT
Start: 2020-01-22 | End: 2021-03-10

## 2020-01-22 ASSESSMENT — PATIENT HEALTH QUESTIONNAIRE - PHQ9: SUM OF ALL RESPONSES TO PHQ QUESTIONS 1-9: 7

## 2020-01-22 NOTE — LETTER
January 23, 2020      Mirta L Brianne  [address] [place] [id number]        Dear ,    We are writing to inform you of your test results.    I have called and sent a MY Chart message with your results.  I am concerned that due to poorly controlled type 2 diabetes, you will not heal well from your dental procedures and other issues.    Read the next page and enclosed handout.  Please schedule an appointment soon.        If you have any questions or concerns, please call the clinic at the number listed above.       Sincerely,        Yani Martin MD

## 2020-01-22 NOTE — NURSING NOTE
Mirta Decker is here for a medication check and refill.    Questioned patient about current smoking habits.  Pt. has never smoked.  PULSE regular  My Chart: active  CLASSIFICATION OF OVERWEIGHT AND OBESITY BY BMI                        Obesity Class           BMI(kg/m2)  Underweight                                    < 18.5  Normal                                         18.5-24.9  Overweight                                     25.0-29.9  OBESITY                     I                  30.0-34.9                             II                 35.0-39.9  EXTREME OBESITY             III                >40                            Patient's  BMI Body mass index is 70.54 kg/m .  http://hin.nhlbi.nih.gov/menuplanner/menu.cgi  Pre-visit planning  Immunizations - Will get Tdap at later date per Pulmonary  Colonoscopy - declines  Mammogram - declines  Asthma -   PHQ9 -    SARA-7 -

## 2020-01-22 NOTE — TELEPHONE ENCOUNTER
Called cell phone and left message about elevated Hgb A1C. Left message on cell phone/ await call back.

## 2020-01-22 NOTE — PATIENT INSTRUCTIONS
Await labs  Immunization discussion with Rheumatology      Lower BP medication and monitor/ recheck 6 months or sooner    Colonoscopy  mammogram
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
## 2020-01-23 LAB
HCV AB - QUEST: NORMAL
SIGNAL TO CUT OFF - QUEST: 0.68

## 2020-01-23 NOTE — TELEPHONE ENCOUNTER
Called again/ results released in MY Chart. Voice message indicated mail box is full and can not take messages. I will send letter today with results

## 2020-01-24 ENCOUNTER — TELEPHONE (OUTPATIENT)
Dept: FAMILY MEDICINE | Facility: CLINIC | Age: 55
End: 2020-01-24

## 2020-01-24 NOTE — TELEPHONE ENCOUNTER
Faxed labs from 5/30/2019 & 1/22/220 to     Dr. Judith Cuellar   [facility]  [address]  [facility] [address]  [phone] - appt line  [phone] - fax line

## 2020-01-28 ENCOUNTER — OFFICE VISIT (OUTPATIENT)
Dept: FAMILY MEDICINE | Facility: CLINIC | Age: 55
End: 2020-01-28

## 2020-01-28 ENCOUNTER — HOSPITAL ENCOUNTER (OUTPATIENT)
Dept: OCCUPATIONAL THERAPY | Facility: CLINIC | Age: 55
Setting detail: THERAPIES SERIES
End: 2020-01-28
Attending: FAMILY MEDICINE
Payer: COMMERCIAL

## 2020-01-28 VITALS
DIASTOLIC BLOOD PRESSURE: 78 MMHG | OXYGEN SATURATION: 94 % | WEIGHT: 293 LBS | RESPIRATION RATE: 18 BRPM | TEMPERATURE: 97.6 F | HEART RATE: 103 BPM | BODY MASS INDEX: 69.84 KG/M2 | SYSTOLIC BLOOD PRESSURE: 140 MMHG

## 2020-01-28 DIAGNOSIS — E66.01 MORBID OBESITY WITH BMI OF 60.0-69.9, ADULT (H): ICD-10-CM

## 2020-01-28 DIAGNOSIS — E11.9 TYPE 2 DIABETES MELLITUS WITHOUT COMPLICATION, WITHOUT LONG-TERM CURRENT USE OF INSULIN (H): Primary | ICD-10-CM

## 2020-01-28 DIAGNOSIS — I10 ESSENTIAL HYPERTENSION, BENIGN: ICD-10-CM

## 2020-01-28 LAB
ALBUMIN URINE MG/G CR: <30 MG/G CREATININE
ALBUMIN URINE MG/SPEC: 10
CREATININE URINE: 50

## 2020-01-28 PROCEDURE — 99214 OFFICE O/P EST MOD 30 MIN: CPT | Performed by: FAMILY MEDICINE

## 2020-01-28 PROCEDURE — 82043 UR ALBUMIN QUANTITATIVE: CPT | Performed by: FAMILY MEDICINE

## 2020-01-28 PROCEDURE — 97140 MANUAL THERAPY 1/> REGIONS: CPT | Mod: GO | Performed by: OCCUPATIONAL THERAPIST

## 2020-01-28 RX ORDER — METFORMIN HCL 500 MG
1000 TABLET, EXTENDED RELEASE 24 HR ORAL
Qty: 180 TABLET | Refills: 0 | Status: SHIPPED | OUTPATIENT
Start: 2020-01-28 | End: 2020-04-15

## 2020-01-28 NOTE — PATIENT INSTRUCTIONS
Schedule diabetes education  Get a good eye exam    Recheck 6-8 weeks fasting, sooner if any medication problems or questions
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
## 2020-01-28 NOTE — NURSING NOTE
Elyssa is here for lab discussion    Pre-visit Screening:  Immunizations:  up to date  Colonoscopy:  is up to date  Mammogram: is up to date  Asthma Action Test/Plan:  NA  PHQ9:  None  GAD7:  None  Questioned patient about current smoking habits Pt. has never smoked.  Ok to leave detailed message on voice mail for today's visit only Yes, phone # [phone]

## 2020-01-29 ENCOUNTER — TELEPHONE (OUTPATIENT)
Dept: FAMILY MEDICINE | Facility: CLINIC | Age: 55
End: 2020-01-29

## 2020-01-29 NOTE — TELEPHONE ENCOUNTER
Diabetes Education Scheduling Outreach #1:    Call to patient to schedule. Left message with phone number to call to schedule.    Plan for 2nd outreach attempt within 1 week.    Yani Smith  [facility]  Diabetes and Nutrition Scheduling

## 2020-01-31 ENCOUNTER — HOSPITAL ENCOUNTER (OUTPATIENT)
Dept: OCCUPATIONAL THERAPY | Facility: CLINIC | Age: 55
Setting detail: THERAPIES SERIES
End: 2020-01-31
Attending: FAMILY MEDICINE
Payer: COMMERCIAL

## 2020-01-31 PROCEDURE — 97140 MANUAL THERAPY 1/> REGIONS: CPT | Mod: GO | Performed by: OCCUPATIONAL THERAPIST

## 2020-02-02 ENCOUNTER — COMMUNICATION - HEALTHEAST (OUTPATIENT)
Dept: PULMONOLOGY | Facility: OTHER | Age: 55
End: 2020-02-02

## 2020-02-02 DIAGNOSIS — R60.0 BILATERAL LEG EDEMA: ICD-10-CM

## 2020-02-03 ENCOUNTER — ALLIED HEALTH/NURSE VISIT (OUTPATIENT)
Dept: EDUCATION SERVICES | Facility: CLINIC | Age: 55
End: 2020-02-03
Payer: COMMERCIAL

## 2020-02-03 ENCOUNTER — TRANSFERRED RECORDS (OUTPATIENT)
Dept: FAMILY MEDICINE | Facility: CLINIC | Age: 55
End: 2020-02-03

## 2020-02-03 DIAGNOSIS — E11.9 DIABETES MELLITUS, TYPE 2 (H): Primary | ICD-10-CM

## 2020-02-03 PROCEDURE — G0108 DIAB MANAGE TRN  PER INDIV: HCPCS | Performed by: DIETITIAN, REGISTERED

## 2020-02-03 RX ORDER — LANCETS
EACH MISCELLANEOUS
Qty: 102 EACH | Refills: 3 | Status: SHIPPED | OUTPATIENT
Start: 2020-02-03 | End: 2021-03-10

## 2020-02-03 NOTE — PATIENT INSTRUCTIONS
Care Plan:  Meal Plan Recommendation: use portion control and use plate planning method, aim for no more than 2-3 carbohydrate choices (30-45 gms) per meal, and 0-1 choices (15 gms) per snack.    Check blood sugars: fasting and 2 hours after the start of dinner  (A1c target: less than 7%, Blood sugar target before meals:  mg/dl, 2 hrs after the start of a meal: less than 180 mg/dl)    Exercise / activity plan: continue physical movement through out the day as able    Continue current Metformin dose for now    Diabetes Support Resources:  Websites: American Diabetes Association: www.diabetes.org    Follow up:  Please call, e-mail,  or send [employer] message with questions, concerns or if follow-up is needed.    Bring blood glucose meter and/or logbook with you to all doctor and follow-up appointments.     Laureen Michael RD, LD, CDE  Diabetes     [facility] for the [facility]:    For Your Diabetes or Nutrition Education Appointments Call:  [phone]   For Diabetes or Nutrition Related Questions Call or Email:   [phone]  [email or web address]  Fax: [phone]   If you need a medication refill please contact your pharmacy. Please allow 3 business days for your refills to be completed.

## 2020-02-03 NOTE — LETTER
2/3/2020         RE: Mirta Decker  [address] [facility] [id number]        Dear Colleague,    Thank you for referring your patient, Mirta Decker, to the [facility]. Please see a copy of my visit note below.    Diabetes Self-Management Education & Support    Diabetes Education Self Management & Training    SUBJECTIVE/OBJECTIVE:  Presents for: Initial Assessment for new diagnosis  Accompanied by: Daughter  Diabetes education in the past 24mo: (P) No  Focus of Visit: Monitoring, Healthy Eating  Diabetes type: (P) Type 2  Date of diagnosis: Jan. 2020  Disease course: Stable  How confident are you filling out medical forms by yourself:: Extremely  Transportation concerns: No  Other concerns:: Physical impairment  Cultural Influences/Ethnic Background:  American    Diabetes Symptoms & Complications  Blurred vision: (P) No  Fatigue: (P) Yes  Neuropathy: No  Foot ulcerations: (P) No  Polydipsia: (P) Yes  Polyphagia: No  Polyuria: (P) Yes  Visual change: (P) No  Weakness: (P) No  Weight loss: (P) No  Slow healing wounds: (P) Yes  Symptom course: Stable  Weight trend: (P) Fluctuating minimally  Autonomic neuropathy: (P) No  CVA: (P) No  Heart disease: (P) No  Nephropathy: (P) No  Peripheral neuropathy: (P) No  Retinopathy: (P) No  Sexual dysfunction: (P) No    Patient Problem List and Family Medical History reviewed for relevant medical history, current medical status, and diabetes risk factors.    Vitals:  There were no vitals taken for this visit.  Estimated body mass index is 69.84 kg/m  as calculated from the following:    Height as of 5/30/19: 1.524 m (5').    Weight as of 1/28/20: 162.2 kg (357 lb 9.6 oz).   Last 3 BP:   BP Readings from Last 3 Encounters:   01/28/20 (!) 140/78   01/22/20 95/61   05/30/19 136/88       History   Smoking Status     Never Smoker   Smokeless Tobacco     Never Used       Labs:  Lab Results   Component Value Date    A1C 9.1 01/22/2020     Lab  Results   Component Value Date     01/22/2020     Lab Results   Component Value Date    LDL 76 05/07/2016     HDL Cholesterol   Date Value Ref Range Status   05/07/2016 45 (L) > OR = 46 mg/dL Final   ]  GFR Estimate   Date Value Ref Range Status   01/22/2020 44 ml/min/1.73m2 Final     GFR Estimate If Black   Date Value Ref Range Status   07/02/2018 >90 >60 mL/min/1.7m2 Final     Comment:      GFR Calc     Lab Results   Component Value Date    CR 1.33 01/22/2020     No results found for: MICROALBUMIN    Healthy Eating  Healthy Eating Assessed Today: Yes  Cultural/Mandaeism diet restrictions?: No  Patient on a regular basis: Keeps food records  Meal planning: (P) Calorie counting, Heart healthy, Low salt, Plate planning method  Meals include: (P) Breakfast, Lunch, Dinner, Snacks  Beverages: (P) Water, Tea, Coffee  Has patient met with a dietitian in the past?: (P) No                Being Active  Being Active Assessed Today: Yes  Exercise:: Unable to exercise  How intense was your typical exercise? : (P) Light (like stretching or slow walking)  Barrier to exercise: (P) Physical limitation    Monitoring  Monitoring Assessed Today: Yes  Did patient bring glucose meter to appointment? : (BG meter instruction provided today)  Blood Glucose Meter: (P) Unknown  Home Glucose (Sugar) Monitoring: (P) Never    Taking Medications  Diabetes Medication(s)     Biguanides       metFORMIN (GLUCOPHAGE-XR) 500 MG 24 hr tablet    Take 2 tablets (1,000 mg) by mouth daily (with dinner)        Taking Medication Assessed Today: Yes  Current Treatments: (P) Diet, Oral Agent (monotherapy)  Problems taking diabetes medications regularly?: No  Treatment Compliance: All of the time    Problem Solving  Problem Solving Assessed Today: Yes  Hypoglycemia Frequency: (P) Other  Patient carries a carbohydrate source: (P) No  Medical alert: (P) No  Severe weather/disaster plan for diabetes management?: (P) No  DKA prevention plan?:  (P) No  Sick day plan for diabetes management?: (P) No    Hypoglycemia symptoms  Confusion: (P) No  Dizziness or Light-Headedness: (P) Yes  Headaches: (P) Yes  Hunger: (P) Yes  Mood changes: (P) Yes  Nervousness/Anxiety: (P) Yes  Sleepiness: (P) Yes  Speech difficulty: (P) Yes  Sweats: (P) No  Tremors: (P) Yes    Hypoglycemia Complications  Blackouts: (P) No  Hospitalization: (P) No  Nocturnal hypoglycemia: (P) No  Required assistance: (P) No  Required glucagon injection: (P) No  Seizures: (P) No    Reducing Risks  Reducing Risks Assessed Today: Yes  Diabetes Risks: Age over 45 years, Family History, Sedentary Lifestyle  CAD Risks: (P) Family history, Hypertension, Obesity, Post-menopausal, Stress  Has dilated eye exam at least once a year?: (P) No  Sees dentist every 6 months?: (P) Yes  Sees podiatrist (foot doctor)?: (P) No    Healthy Coping  Healthy Coping Assessed Today: Yes  Emotional response to diabetes: Ready to learn, Acceptance  Informal Support system:: (P) Children, [place]wal based, Family, Friends, Parent, Spouse  Stage of change: ACTION (Actively working towards change)  Difficulty affording diabetes management supplies?: (P) No  Support resources: Websites  Patient Activation Measure Survey Score:  MAURICE Score (Last Two) 9/9/2015   MAURICE Raw Score 46   Activation Score 75.3   MAURICE Level 4       ASSESSMENT:  Pt with new dx- needs education on carb counting, BG monitoring, and DM medication. Pt and daughter very receptive to information. Pt is carb aware, enjoys healthy eating. Pt is willing to start BG monitoring.     Goals Addressed as of 2/3/2020 at 10:28 AM        Patient Stated      Healthy Eating (pt-stated)     Added 2/3/20 by Laureen Michael RD     My Goal: I will aim for no more than 30-45 gms of carbs per meal (max 60 gms), and 0-15 gms per snack    What I need to meet my goal: read labels, keep a food record, refer to carb counting resources    I plan to meet my goal by this date: next CDE visit, 1  month          Monitoring (pt-stated)     Added 2/3/20 by Laureen Michael RD     My Goal: I will learn how medications, exercise, and food affect my blood sugars    What I need to meet my goal: monitor blood sugar twice daily    I plan to meet my goal by this date: next CDE visit, 1 month            Patient's most recent   Lab Results   Component Value Date    A1C 9.1 01/22/2020    is not meeting goal of <7.0    INTERVENTION:   Diabetes knowledge and skills assessment:     Patient is knowledgeable in diabetes management concepts related to: new dx    Patient needs further education on the following diabetes management concepts: Healthy Eating, Being Active, Monitoring, Taking Medication, Problem Solving, Reducing Risks and Healthy Coping    Based on learning assessment above, most appropriate setting for further diabetes education would be: Group class or Individual setting.    Education provided today on:  AADE Self-Care Behaviors:  Diabetes Pathophysiology  Healthy Eating: carbohydrate counting, consistency in amount, composition, and timing of food intake, weight reduction, portion control, plate planning method and label reading  Being Active: relationship to blood glucose  Monitoring: purpose, proper technique, log and interpret results, individual blood glucose targets, frequency of monitoring and proper sharps disposal  Taking Medication: action of prescribed medication, side effects of prescribed medications and when to take medications  Problem Solving: high blood glucose - causes, signs/symptoms, treatment and prevention and when to call health care provider  Reducing Risks: A1C - goals, relating to blood glucose levels, how often to check  Healthy Coping: recognize feelings about diagnosis and utilize support systems    Patient was instructed on Accu-Chek Guide meter and was able to provide an accurate return demonstration. Patient's blood glucose reading today was 222 mg/dL.    Opportunities for ongoing  education and support in diabetes-self management were discussed.    Pt verbalized understanding of concepts discussed and recommendations provided today.       Education Materials Provided:  [place] Understanding Diabetes Booklet, Safe Disposal Options for Needles & Syringes, BG Log Sheet and Accu-Chek Guide meter kit    PLAN:  See Patient Instructions for co-developed, patient-stated behavior change goals.  AVS printed and provided to patient today. See Follow-Up section for recommended follow-up.    Laureen Michael RD, CDE  Diabetes     Time Spent: 45 minutes  Encounter Type: Individual    Any diabetes medication dose changes were made via the CDE Protocol and Collaborative Practice Agreement with the patient's primary care provider. A copy of this encounter was shared with the provider.

## 2020-02-03 NOTE — PROGRESS NOTES
Diabetes Self-Management Education & Support    Diabetes Education Self Management & Training    SUBJECTIVE/OBJECTIVE:  Presents for: Initial Assessment for new diagnosis  Accompanied by: Daughter  Diabetes education in the past 24mo: (P) No  Focus of Visit: Monitoring, Healthy Eating  Diabetes type: (P) Type 2  Date of diagnosis: Jan. 2020  Disease course: Stable  How confident are you filling out medical forms by yourself:: Extremely  Transportation concerns: No  Other concerns:: Physical impairment  Cultural Influences/Ethnic Background:  American    Diabetes Symptoms & Complications  Blurred vision: (P) No  Fatigue: (P) Yes  Neuropathy: No  Foot ulcerations: (P) No  Polydipsia: (P) Yes  Polyphagia: No  Polyuria: (P) Yes  Visual change: (P) No  Weakness: (P) No  Weight loss: (P) No  Slow healing wounds: (P) Yes  Symptom course: Stable  Weight trend: (P) Fluctuating minimally  Autonomic neuropathy: (P) No  CVA: (P) No  Heart disease: (P) No  Nephropathy: (P) No  Peripheral neuropathy: (P) No  Retinopathy: (P) No  Sexual dysfunction: (P) No    Patient Problem List and Family Medical History reviewed for relevant medical history, current medical status, and diabetes risk factors.    Vitals:  There were no vitals taken for this visit.  Estimated body mass index is 69.84 kg/m  as calculated from the following:    Height as of 5/30/19: 1.524 m (5').    Weight as of 1/28/20: 162.2 kg (357 lb 9.6 oz).   Last 3 BP:   BP Readings from Last 3 Encounters:   01/28/20 (!) 140/78   01/22/20 95/61   05/30/19 136/88       History   Smoking Status     Never Smoker   Smokeless Tobacco     Never Used       Labs:  Lab Results   Component Value Date    A1C 9.1 01/22/2020     Lab Results   Component Value Date     01/22/2020     Lab Results   Component Value Date    LDL 76 05/07/2016     HDL Cholesterol   Date Value Ref Range Status   05/07/2016 45 (L) > OR = 46 mg/dL Final   ]  GFR Estimate   Date Value Ref Range Status    01/22/2020 44 ml/min/1.73m2 Final     GFR Estimate If Black   Date Value Ref Range Status   07/02/2018 >90 >60 mL/min/1.7m2 Final     Comment:      GFR Calc     Lab Results   Component Value Date    CR 1.33 01/22/2020     No results found for: MICROALBUMIN    Healthy Eating  Healthy Eating Assessed Today: Yes  Cultural/Jehovah[employer] witness diet restrictions?: No  Patient on a regular basis: Keeps food records  Meal planning: (P) Calorie counting, Heart healthy, Low salt, Plate planning method  Meals include: (P) Breakfast, Lunch, Dinner, Snacks  Beverages: (P) Water, Tea, Coffee  Has patient met with a dietitian in the past?: (P) No                Being Active  Being Active Assessed Today: Yes  Exercise:: Unable to exercise  How intense was your typical exercise? : (P) Light (like stretching or slow walking)  Barrier to exercise: (P) Physical limitation    Monitoring  Monitoring Assessed Today: Yes  Did patient bring glucose meter to appointment? : (BG meter instruction provided today)  Blood Glucose Meter: (P) Unknown  Home Glucose (Sugar) Monitoring: (P) Never    Taking Medications  Diabetes Medication(s)     Biguanides       metFORMIN (GLUCOPHAGE-XR) 500 MG 24 hr tablet    Take 2 tablets (1,000 mg) by mouth daily (with dinner)        Taking Medication Assessed Today: Yes  Current Treatments: (P) Diet, Oral Agent (monotherapy)  Problems taking diabetes medications regularly?: No  Treatment Compliance: All of the time    Problem Solving  Problem Solving Assessed Today: Yes  Hypoglycemia Frequency: (P) Other  Patient carries a carbohydrate source: (P) No  Medical alert: (P) No  Severe weather/disaster plan for diabetes management?: (P) No  DKA prevention plan?: (P) No  Sick day plan for diabetes management?: (P) No    Hypoglycemia symptoms  Confusion: (P) No  Dizziness or Light-Headedness: (P) Yes  Headaches: (P) Yes  Hunger: (P) Yes  Mood changes: (P) Yes  Nervousness/Anxiety: (P) Yes  Sleepiness: (P)  Yes  Speech difficulty: (P) Yes  Sweats: (P) No  Tremors: (P) Yes    Hypoglycemia Complications  Blackouts: (P) No  Hospitalization: (P) No  Nocturnal hypoglycemia: (P) No  Required assistance: (P) No  Required glucagon injection: (P) No  Seizures: (P) No    Reducing Risks  Reducing Risks Assessed Today: Yes  Diabetes Risks: Age over 45 years, Family History, Sedentary Lifestyle  CAD Risks: (P) Family history, Hypertension, Obesity, Post-menopausal, Stress  Has dilated eye exam at least once a year?: (P) No  Sees dentist every 6 months?: (P) Yes  Sees podiatrist (foot doctor)?: (P) No    Healthy Coping  Healthy Coping Assessed Today: Yes  Emotional response to diabetes: Ready to learn, Acceptance  Informal Support system:: (P) Children, Kanwal based, Family, Friends, Parent, Spouse  Stage of change: ACTION (Actively working towards change)  Difficulty affording diabetes management supplies?: (P) No  Support resources: Websites  Patient Activation Measure Survey Score:  MAURICE Score (Last Two) 9/9/2015   MAURICE Raw Score 46   Activation Score 75.3   MAURICE Level 4       ASSESSMENT:  Pt with new dx- needs education on carb counting, BG monitoring, and DM medication. Pt and daughter very receptive to information. Pt is carb aware, enjoys healthy eating. Pt is willing to start BG monitoring.     Goals Addressed as of 2/3/2020 at 10:28 AM        Patient Stated      Healthy Eating (pt-stated)     Added 2/3/20 by Laureen Michael RD     My Goal: I will aim for no more than 30-45 gms of carbs per meal (max 60 gms), and 0-15 gms per snack    What I need to meet my goal: read labels, keep a food record, refer to carb counting resources    I plan to meet my goal by this date: next CDE visit, 1 month          Monitoring (pt-stated)     Added 2/3/20 by Laureen Michael RD     My Goal: I will learn how medications, exercise, and food affect my blood sugars    What I need to meet my goal: monitor blood sugar twice daily    I plan to meet my  goal by this date: next CDE visit, 1 month            Patient's most recent   Lab Results   Component Value Date    A1C 9.1 01/22/2020    is not meeting goal of <7.0    INTERVENTION:   Diabetes knowledge and skills assessment:     Patient is knowledgeable in diabetes management concepts related to: new dx    Patient needs further education on the following diabetes management concepts: Healthy Eating, Being Active, Monitoring, Taking Medication, Problem Solving, Reducing Risks and Healthy Coping    Based on learning assessment above, most appropriate setting for further diabetes education would be: Group class or Individual setting.    Education provided today on:  AADE Self-Care Behaviors:  Diabetes Pathophysiology  Healthy Eating: carbohydrate counting, consistency in amount, composition, and timing of food intake, weight reduction, portion control, plate planning method and label reading  Being Active: relationship to blood glucose  Monitoring: purpose, proper technique, log and interpret results, individual blood glucose targets, frequency of monitoring and proper sharps disposal  Taking Medication: action of prescribed medication, side effects of prescribed medications and when to take medications  Problem Solving: high blood glucose - causes, signs/symptoms, treatment and prevention and when to call health care provider  Reducing Risks: A1C - goals, relating to blood glucose levels, how often to check  Healthy Coping: recognize feelings about diagnosis and utilize support systems    Patient was instructed on Accu-Chek Guide meter and was able to provide an accurate return demonstration. Patient's blood glucose reading today was 222 mg/dL.    Opportunities for ongoing education and support in diabetes-self management were discussed.    Pt verbalized understanding of concepts discussed and recommendations provided today.       Education Materials Provided:  [place] Understanding Diabetes Booklet, Safe Disposal  Options for Needles & Syringes, BG Log Sheet and Accu-Chek Guide meter kit    PLAN:  See Patient Instructions for co-developed, patient-stated behavior change goals.  AVS printed and provided to patient today. See Follow-Up section for recommended follow-up.    Laureen Michael RD, CDE  Diabetes     Time Spent: 45 minutes  Encounter Type: Individual    Any diabetes medication dose changes were made via the CDE Protocol and Collaborative Practice Agreement with the patient's primary care provider. A copy of this encounter was shared with the provider.

## 2020-02-04 ENCOUNTER — HOSPITAL ENCOUNTER (OUTPATIENT)
Dept: OCCUPATIONAL THERAPY | Facility: CLINIC | Age: 55
Setting detail: THERAPIES SERIES
End: 2020-02-04
Attending: FAMILY MEDICINE
Payer: COMMERCIAL

## 2020-02-04 PROCEDURE — 97140 MANUAL THERAPY 1/> REGIONS: CPT | Mod: GO | Performed by: OCCUPATIONAL THERAPIST

## 2020-02-07 ENCOUNTER — HOSPITAL ENCOUNTER (OUTPATIENT)
Dept: OCCUPATIONAL THERAPY | Facility: CLINIC | Age: 55
Setting detail: THERAPIES SERIES
End: 2020-02-07
Attending: FAMILY MEDICINE
Payer: COMMERCIAL

## 2020-02-07 PROCEDURE — 97140 MANUAL THERAPY 1/> REGIONS: CPT | Mod: GO | Performed by: OCCUPATIONAL THERAPIST

## 2020-02-11 ENCOUNTER — HOSPITAL ENCOUNTER (OUTPATIENT)
Dept: RESPIRATORY THERAPY | Facility: CLINIC | Age: 55
Discharge: HOME | End: 2020-02-11

## 2020-02-11 ENCOUNTER — HOSPITAL ENCOUNTER (OUTPATIENT)
Dept: OCCUPATIONAL THERAPY | Facility: CLINIC | Age: 55
Setting detail: THERAPIES SERIES
End: 2020-02-11
Attending: FAMILY MEDICINE
Payer: COMMERCIAL

## 2020-02-11 ENCOUNTER — COMMUNICATION - HEALTHEAST (OUTPATIENT)
Dept: PULMONOLOGY | Facility: OTHER | Age: 55
End: 2020-02-11

## 2020-02-11 DIAGNOSIS — J45.21 MILD INTERMITTENT ASTHMA WITH EXACERBATION: ICD-10-CM

## 2020-02-11 DIAGNOSIS — E66.01 MORBID OBESITY WITH BMI OF 60.0-69.9, ADULT (H): ICD-10-CM

## 2020-02-11 DIAGNOSIS — J96.12 CHRONIC HYPERCAPNIC RESPIRATORY FAILURE (H): ICD-10-CM

## 2020-02-11 LAB
BASE EXCESS BLDA CALC-SCNC: 16.3 MMOL/L
COHGB MFR BLD: 98 % (ref 96–97)
FLOW: 3 LPM
HCO3, ARTERIAL CALC - HISTORICAL: 37.6 MMOL/L (ref 23–29)
OXYHEMOGLOBIN - HISTORICAL: 95.4 % (ref 96–97)
PCO2 BLD: 44 MM HG (ref 35–45)
PH BLD: 7.57 [PH] (ref 7.37–7.44)
PO2 BLD: 126 MM HG (ref 80–90)
TEMPERATURE: 37 DEGREES C
VENTILATION MODE: ABNORMAL

## 2020-02-11 PROCEDURE — 97140 MANUAL THERAPY 1/> REGIONS: CPT | Mod: GO | Performed by: OCCUPATIONAL THERAPIST

## 2020-02-14 ENCOUNTER — HOSPITAL ENCOUNTER (OUTPATIENT)
Dept: RESPIRATORY THERAPY | Facility: HOSPITAL | Age: 55
Discharge: HOME | End: 2020-02-14

## 2020-02-14 ENCOUNTER — COMMUNICATION - HEALTHEAST (OUTPATIENT)
Dept: PULMONOLOGY | Facility: OTHER | Age: 55
End: 2020-02-14

## 2020-02-14 ENCOUNTER — HOSPITAL ENCOUNTER (OUTPATIENT)
Dept: OCCUPATIONAL THERAPY | Facility: CLINIC | Age: 55
Setting detail: THERAPIES SERIES
End: 2020-02-14
Attending: FAMILY MEDICINE
Payer: COMMERCIAL

## 2020-02-14 DIAGNOSIS — J45.21 MILD INTERMITTENT ASTHMA WITH EXACERBATION: ICD-10-CM

## 2020-02-14 LAB
BASE EXCESS BLDA CALC-SCNC: 12.8 MMOL/L
COHGB MFR BLD: 98.9 % (ref 96–97)
FLOW: 4 LPM
HCO3, ARTERIAL CALC - HISTORICAL: 34.9 MMOL/L (ref 23–29)
OXYHEMOGLOBIN - HISTORICAL: 96.9 % (ref 96–97)
PCO2 BLD: 47 MM HG (ref 35–45)
PH BLD: 7.51 [PH] (ref 7.37–7.44)
PO2 BLD: 100 MM HG (ref 80–90)
TEMPERATURE: 37 DEGREES C
VENTILATION MODE: ABNORMAL

## 2020-02-14 PROCEDURE — 97140 MANUAL THERAPY 1/> REGIONS: CPT | Mod: GO | Performed by: OCCUPATIONAL THERAPIST

## 2020-02-17 ENCOUNTER — COMMUNICATION - HEALTHEAST (OUTPATIENT)
Dept: PULMONOLOGY | Facility: OTHER | Age: 55
End: 2020-02-17

## 2020-02-17 ENCOUNTER — HOSPITAL ENCOUNTER (OUTPATIENT)
Dept: OCCUPATIONAL THERAPY | Facility: CLINIC | Age: 55
Setting detail: THERAPIES SERIES
End: 2020-02-17
Attending: FAMILY MEDICINE
Payer: COMMERCIAL

## 2020-02-17 PROCEDURE — 97140 MANUAL THERAPY 1/> REGIONS: CPT | Mod: GO | Performed by: OCCUPATIONAL THERAPIST

## 2020-02-18 ENCOUNTER — HOSPITAL ENCOUNTER (OUTPATIENT)
Dept: OCCUPATIONAL THERAPY | Facility: CLINIC | Age: 55
Setting detail: THERAPIES SERIES
End: 2020-02-18
Attending: FAMILY MEDICINE
Payer: COMMERCIAL

## 2020-02-18 PROCEDURE — 97140 MANUAL THERAPY 1/> REGIONS: CPT | Mod: GO | Performed by: OCCUPATIONAL THERAPIST

## 2020-02-21 ENCOUNTER — COMMUNICATION - HEALTHEAST (OUTPATIENT)
Dept: PULMONOLOGY | Facility: OTHER | Age: 55
End: 2020-02-21

## 2020-02-28 ENCOUNTER — HOSPITAL ENCOUNTER (OUTPATIENT)
Dept: OCCUPATIONAL THERAPY | Facility: CLINIC | Age: 55
Setting detail: THERAPIES SERIES
End: 2020-02-28
Attending: FAMILY MEDICINE
Payer: COMMERCIAL

## 2020-02-28 PROCEDURE — 97140 MANUAL THERAPY 1/> REGIONS: CPT | Mod: GO | Performed by: OCCUPATIONAL THERAPIST

## 2020-02-28 PROCEDURE — 97535 SELF CARE MNGMENT TRAINING: CPT | Mod: GO | Performed by: OCCUPATIONAL THERAPIST

## 2020-03-02 ENCOUNTER — RECORDS - HEALTHEAST (OUTPATIENT)
Dept: ADMINISTRATIVE | Facility: OTHER | Age: 55
End: 2020-03-02

## 2020-03-06 ENCOUNTER — OFFICE VISIT - HEALTHEAST (OUTPATIENT)
Dept: PULMONOLOGY | Facility: OTHER | Age: 55
End: 2020-03-06

## 2020-03-06 ENCOUNTER — HOSPITAL ENCOUNTER (OUTPATIENT)
Dept: OCCUPATIONAL THERAPY | Facility: CLINIC | Age: 55
Setting detail: THERAPIES SERIES
End: 2020-03-06
Attending: FAMILY MEDICINE
Payer: COMMERCIAL

## 2020-03-06 DIAGNOSIS — I50.32 CHRONIC HEART FAILURE WITH PRESERVED EJECTION FRACTION (H): ICD-10-CM

## 2020-03-06 DIAGNOSIS — I89.0 LYMPH EDEMA: ICD-10-CM

## 2020-03-06 DIAGNOSIS — J96.12 CHRONIC HYPERCAPNIC RESPIRATORY FAILURE (H): ICD-10-CM

## 2020-03-06 DIAGNOSIS — G47.33 OSA (OBSTRUCTIVE SLEEP APNEA): ICD-10-CM

## 2020-03-06 DIAGNOSIS — E66.01 MORBID OBESITY WITH BMI OF 60.0-69.9, ADULT (H): ICD-10-CM

## 2020-03-06 PROCEDURE — 97140 MANUAL THERAPY 1/> REGIONS: CPT | Mod: GO | Performed by: OCCUPATIONAL THERAPIST

## 2020-03-08 ENCOUNTER — APPOINTMENT (OUTPATIENT)
Dept: GENERAL RADIOLOGY | Facility: CLINIC | Age: 55
End: 2020-03-08
Attending: EMERGENCY MEDICINE
Payer: COMMERCIAL

## 2020-03-08 ENCOUNTER — HOSPITAL ENCOUNTER (OUTPATIENT)
Facility: CLINIC | Age: 55
Setting detail: OBSERVATION
Discharge: HOME | End: 2020-03-09
Attending: EMERGENCY MEDICINE | Admitting: INTERNAL MEDICINE
Payer: COMMERCIAL

## 2020-03-08 DIAGNOSIS — J96.21 ACUTE ON CHRONIC RESPIRATORY FAILURE WITH HYPOXIA (H): ICD-10-CM

## 2020-03-08 DIAGNOSIS — J11.1 INFLUENZA-LIKE ILLNESS: ICD-10-CM

## 2020-03-08 DIAGNOSIS — I10 ESSENTIAL HYPERTENSION, BENIGN: ICD-10-CM

## 2020-03-08 DIAGNOSIS — I89.0 LYMPH EDEMA: Primary | ICD-10-CM

## 2020-03-08 PROBLEM — B34.9 VIRAL SYNDROME: Status: ACTIVE | Noted: 2020-03-08

## 2020-03-08 LAB
ALBUMIN SERPL-MCNC: 3.2 G/DL (ref 3.4–5)
ALBUMIN UR-MCNC: NEGATIVE MG/DL
ALP SERPL-CCNC: 75 U/L (ref 40–150)
ALT SERPL W P-5'-P-CCNC: 35 U/L (ref 0–50)
ANION GAP SERPL CALCULATED.3IONS-SCNC: 5 MMOL/L (ref 3–14)
APPEARANCE UR: CLEAR
AST SERPL W P-5'-P-CCNC: 39 U/L (ref 0–45)
BASE EXCESS BLDA CALC-SCNC: 9.3 MMOL/L
BASE EXCESS BLDV CALC-SCNC: 11.3 MMOL/L
BASE EXCESS BLDV CALC-SCNC: 11.9 MMOL/L
BASE EXCESS BLDV CALC-SCNC: 7.8 MMOL/L
BASOPHILS # BLD AUTO: 0 10E9/L (ref 0–0.2)
BASOPHILS NFR BLD AUTO: 0.2 %
BILIRUB SERPL-MCNC: 0.4 MG/DL (ref 0.2–1.3)
BILIRUB UR QL STRIP: NEGATIVE
BUN SERPL-MCNC: 17 MG/DL (ref 7–30)
CALCIUM SERPL-MCNC: 9.3 MG/DL (ref 8.5–10.1)
CHLORIDE SERPL-SCNC: 97 MMOL/L (ref 94–109)
CO2 SERPL-SCNC: 35 MMOL/L (ref 20–32)
COLOR UR AUTO: ABNORMAL
CREAT SERPL-MCNC: 0.93 MG/DL (ref 0.52–1.04)
DIFFERENTIAL METHOD BLD: ABNORMAL
EOSINOPHIL # BLD AUTO: 0.1 10E9/L (ref 0–0.7)
EOSINOPHIL NFR BLD AUTO: 0.6 %
ERYTHROCYTE [DISTWIDTH] IN BLOOD BY AUTOMATED COUNT: 14.7 % (ref 10–15)
FLUAV+FLUBV AG SPEC QL: NEGATIVE
FLUAV+FLUBV AG SPEC QL: NEGATIVE
GFR SERPL CREATININE-BSD FRML MDRD: 68 ML/MIN/{1.73_M2}
GLUCOSE BLDC GLUCOMTR-MCNC: 158 MG/DL (ref 70–99)
GLUCOSE BLDC GLUCOMTR-MCNC: 278 MG/DL (ref 70–99)
GLUCOSE SERPL-MCNC: 175 MG/DL (ref 70–99)
GLUCOSE UR STRIP-MCNC: NEGATIVE MG/DL
HCO3 BLD-SCNC: 36 MMOL/L (ref 21–28)
HCO3 BLDV-SCNC: 35 MMOL/L (ref 21–28)
HCO3 BLDV-SCNC: 39 MMOL/L (ref 21–28)
HCO3 BLDV-SCNC: 40 MMOL/L (ref 21–28)
HCT VFR BLD AUTO: 40.4 % (ref 35–47)
HGB BLD-MCNC: 11.9 G/DL (ref 11.7–15.7)
HGB UR QL STRIP: NEGATIVE
HYALINE CASTS #/AREA URNS LPF: 1 /LPF (ref 0–2)
IMM GRANULOCYTES # BLD: 0.1 10E9/L (ref 0–0.4)
IMM GRANULOCYTES NFR BLD: 0.6 %
KETONES UR STRIP-MCNC: NEGATIVE MG/DL
LACTATE BLD-SCNC: 1.7 MMOL/L (ref 0.7–2)
LACTATE BLD-SCNC: 2.5 MMOL/L (ref 0.7–2)
LEUKOCYTE ESTERASE UR QL STRIP: NEGATIVE
LIPASE SERPL-CCNC: 169 U/L (ref 73–393)
LYMPHOCYTES # BLD AUTO: 0.5 10E9/L (ref 0.8–5.3)
LYMPHOCYTES NFR BLD AUTO: 3 %
MCH RBC QN AUTO: 27.2 PG (ref 26.5–33)
MCHC RBC AUTO-ENTMCNC: 29.5 G/DL (ref 31.5–36.5)
MCV RBC AUTO: 92 FL (ref 78–100)
MONOCYTES # BLD AUTO: 0.4 10E9/L (ref 0–1.3)
MONOCYTES NFR BLD AUTO: 2.5 %
MUCOUS THREADS #/AREA URNS LPF: PRESENT /LPF
NEUTROPHILS # BLD AUTO: 15.9 10E9/L (ref 1.6–8.3)
NEUTROPHILS NFR BLD AUTO: 93.1 %
NITRATE UR QL: NEGATIVE
NRBC # BLD AUTO: 0 10*3/UL
NRBC BLD AUTO-RTO: 0 /100
NT-PROBNP SERPL-MCNC: 110 PG/ML (ref 0–900)
O2/TOTAL GAS SETTING VFR VENT: 3 %
O2/TOTAL GAS SETTING VFR VENT: ABNORMAL %
OXYHGB MFR BLD: 78 % (ref 92–100)
OXYHGB MFR BLDV: 43 %
OXYHGB MFR BLDV: 69 %
OXYHGB MFR BLDV: 92 %
PCO2 BLD: 57 MM HG (ref 35–45)
PCO2 BLDV: 58 MM HG (ref 40–50)
PCO2 BLDV: 67 MM HG (ref 40–50)
PCO2 BLDV: 72 MM HG (ref 40–50)
PH BLD: 7.41 PH (ref 7.35–7.45)
PH BLDV: 7.35 PH (ref 7.32–7.43)
PH BLDV: 7.38 PH (ref 7.32–7.43)
PH BLDV: 7.38 PH (ref 7.32–7.43)
PH UR STRIP: 5 PH (ref 5–7)
PLATELET # BLD AUTO: 222 10E9/L (ref 150–450)
PO2 BLD: 42 MM HG (ref 80–105)
PO2 BLDV: 25 MM HG (ref 25–47)
PO2 BLDV: 37 MM HG (ref 25–47)
PO2 BLDV: 63 MM HG (ref 25–47)
POTASSIUM SERPL-SCNC: 4 MMOL/L (ref 3.4–5.3)
PROCALCITONIN SERPL-MCNC: 0.28 NG/ML
PROT SERPL-MCNC: 8.7 G/DL (ref 6.8–8.8)
RBC # BLD AUTO: 4.37 10E12/L (ref 3.8–5.2)
RBC #/AREA URNS AUTO: <1 /HPF (ref 0–2)
SODIUM SERPL-SCNC: 137 MMOL/L (ref 133–144)
SOURCE: ABNORMAL
SP GR UR STRIP: 1.01 (ref 1–1.03)
SPECIMEN SOURCE: NORMAL
TROPONIN I SERPL-MCNC: <0.015 UG/L (ref 0–0.04)
UROBILINOGEN UR STRIP-MCNC: NORMAL MG/DL (ref 0–2)
WBC # BLD AUTO: 17 10E9/L (ref 4–11)
WBC #/AREA URNS AUTO: 1 /HPF (ref 0–5)

## 2020-03-08 PROCEDURE — 82805 BLOOD GASES W/O2 SATURATION: CPT | Mod: 91 | Performed by: EMERGENCY MEDICINE

## 2020-03-08 PROCEDURE — 83690 ASSAY OF LIPASE: CPT | Performed by: EMERGENCY MEDICINE

## 2020-03-08 PROCEDURE — 85025 COMPLETE CBC W/AUTO DIFF WBC: CPT | Performed by: EMERGENCY MEDICINE

## 2020-03-08 PROCEDURE — 82805 BLOOD GASES W/O2 SATURATION: CPT | Performed by: PHYSICIAN ASSISTANT

## 2020-03-08 PROCEDURE — 96361 HYDRATE IV INFUSION ADD-ON: CPT

## 2020-03-08 PROCEDURE — 96372 THER/PROPH/DIAG INJ SC/IM: CPT | Mod: XS

## 2020-03-08 PROCEDURE — 25800030 ZZH RX IP 258 OP 636: Performed by: EMERGENCY MEDICINE

## 2020-03-08 PROCEDURE — G0378 HOSPITAL OBSERVATION PER HR: HCPCS

## 2020-03-08 PROCEDURE — 36415 COLL VENOUS BLD VENIPUNCTURE: CPT | Performed by: PHYSICIAN ASSISTANT

## 2020-03-08 PROCEDURE — 94640 AIRWAY INHALATION TREATMENT: CPT

## 2020-03-08 PROCEDURE — 99285 EMERGENCY DEPT VISIT HI MDM: CPT | Mod: 25

## 2020-03-08 PROCEDURE — 25000128 H RX IP 250 OP 636: Performed by: EMERGENCY MEDICINE

## 2020-03-08 PROCEDURE — 36600 WITHDRAWAL OF ARTERIAL BLOOD: CPT

## 2020-03-08 PROCEDURE — 96375 TX/PRO/DX INJ NEW DRUG ADDON: CPT

## 2020-03-08 PROCEDURE — 71045 X-RAY EXAM CHEST 1 VIEW: CPT

## 2020-03-08 PROCEDURE — 93005 ELECTROCARDIOGRAM TRACING: CPT

## 2020-03-08 PROCEDURE — 81001 URINALYSIS AUTO W/SCOPE: CPT | Performed by: EMERGENCY MEDICINE

## 2020-03-08 PROCEDURE — 25000125 ZZHC RX 250: Performed by: EMERGENCY MEDICINE

## 2020-03-08 PROCEDURE — 87086 URINE CULTURE/COLONY COUNT: CPT | Performed by: EMERGENCY MEDICINE

## 2020-03-08 PROCEDURE — 84484 ASSAY OF TROPONIN QUANT: CPT | Performed by: EMERGENCY MEDICINE

## 2020-03-08 PROCEDURE — 99220 ZZC INITIAL OBSERVATION CARE,LEVL III: CPT | Performed by: PHYSICIAN ASSISTANT

## 2020-03-08 PROCEDURE — 25000131 ZZH RX MED GY IP 250 OP 636 PS 637: Performed by: PHYSICIAN ASSISTANT

## 2020-03-08 PROCEDURE — 00000146 ZZHCL STATISTIC GLUCOSE BY METER IP

## 2020-03-08 PROCEDURE — 25000132 ZZH RX MED GY IP 250 OP 250 PS 637: Performed by: EMERGENCY MEDICINE

## 2020-03-08 PROCEDURE — 36415 COLL VENOUS BLD VENIPUNCTURE: CPT | Performed by: EMERGENCY MEDICINE

## 2020-03-08 PROCEDURE — 96374 THER/PROPH/DIAG INJ IV PUSH: CPT

## 2020-03-08 PROCEDURE — 25800030 ZZH RX IP 258 OP 636: Performed by: PHYSICIAN ASSISTANT

## 2020-03-08 PROCEDURE — 25000132 ZZH RX MED GY IP 250 OP 250 PS 637: Performed by: PHYSICIAN ASSISTANT

## 2020-03-08 PROCEDURE — 83605 ASSAY OF LACTIC ACID: CPT | Performed by: EMERGENCY MEDICINE

## 2020-03-08 PROCEDURE — 25000125 ZZHC RX 250: Performed by: PHYSICIAN ASSISTANT

## 2020-03-08 PROCEDURE — 87804 INFLUENZA ASSAY W/OPTIC: CPT | Performed by: EMERGENCY MEDICINE

## 2020-03-08 PROCEDURE — 87040 BLOOD CULTURE FOR BACTERIA: CPT | Performed by: EMERGENCY MEDICINE

## 2020-03-08 PROCEDURE — 80053 COMPREHEN METABOLIC PANEL: CPT | Performed by: EMERGENCY MEDICINE

## 2020-03-08 PROCEDURE — 84145 PROCALCITONIN (PCT): CPT | Performed by: EMERGENCY MEDICINE

## 2020-03-08 PROCEDURE — 83880 ASSAY OF NATRIURETIC PEPTIDE: CPT | Performed by: EMERGENCY MEDICINE

## 2020-03-08 PROCEDURE — 40000275 ZZH STATISTIC RCP TIME EA 10 MIN

## 2020-03-08 RX ORDER — FOLIC ACID 1 MG/1
1 TABLET ORAL DAILY
Status: DISCONTINUED | OUTPATIENT
Start: 2020-03-08 | End: 2020-03-09 | Stop reason: HOSPADM

## 2020-03-08 RX ORDER — ACETAMINOPHEN 325 MG/1
650 TABLET ORAL EVERY 4 HOURS PRN
Status: DISCONTINUED | OUTPATIENT
Start: 2020-03-08 | End: 2020-03-09 | Stop reason: HOSPADM

## 2020-03-08 RX ORDER — SODIUM CHLORIDE 9 MG/ML
INJECTION, SOLUTION INTRAVENOUS CONTINUOUS
Status: DISCONTINUED | OUTPATIENT
Start: 2020-03-08 | End: 2020-03-09 | Stop reason: HOSPADM

## 2020-03-08 RX ORDER — POLYETHYLENE GLYCOL 3350 17 G/17G
17 POWDER, FOR SOLUTION ORAL DAILY
Status: DISCONTINUED | OUTPATIENT
Start: 2020-03-08 | End: 2020-03-09 | Stop reason: HOSPADM

## 2020-03-08 RX ORDER — ACETAMINOPHEN 500 MG
1000 TABLET ORAL ONCE
Status: COMPLETED | OUTPATIENT
Start: 2020-03-08 | End: 2020-03-08

## 2020-03-08 RX ORDER — PROCHLORPERAZINE MALEATE 10 MG
10 TABLET ORAL EVERY 6 HOURS PRN
Status: DISCONTINUED | OUTPATIENT
Start: 2020-03-08 | End: 2020-03-09 | Stop reason: HOSPADM

## 2020-03-08 RX ORDER — TORSEMIDE 20 MG/1
80 TABLET ORAL 2 TIMES DAILY
Status: DISCONTINUED | OUTPATIENT
Start: 2020-03-09 | End: 2020-03-08

## 2020-03-08 RX ORDER — ACETAMINOPHEN 325 MG/1
650 TABLET ORAL EVERY 6 HOURS PRN
COMMUNITY
End: 2023-04-28

## 2020-03-08 RX ORDER — BUDESONIDE 0.5 MG/2ML
0.5 INHALANT ORAL 2 TIMES DAILY
Status: DISCONTINUED | OUTPATIENT
Start: 2020-03-08 | End: 2020-03-09 | Stop reason: HOSPADM

## 2020-03-08 RX ORDER — DEXTROSE MONOHYDRATE 25 G/50ML
25-50 INJECTION, SOLUTION INTRAVENOUS
Status: DISCONTINUED | OUTPATIENT
Start: 2020-03-08 | End: 2020-03-09 | Stop reason: HOSPADM

## 2020-03-08 RX ORDER — FAMOTIDINE 20 MG/1
20 TABLET, FILM COATED ORAL 2 TIMES DAILY
Status: DISCONTINUED | OUTPATIENT
Start: 2020-03-08 | End: 2020-03-09 | Stop reason: HOSPADM

## 2020-03-08 RX ORDER — CALCIPOTRIENE 50 UG/G
CREAM TOPICAL
COMMUNITY

## 2020-03-08 RX ORDER — IPRATROPIUM BROMIDE AND ALBUTEROL SULFATE 2.5; .5 MG/3ML; MG/3ML
1 SOLUTION RESPIRATORY (INHALATION) EVERY 4 HOURS PRN
COMMUNITY

## 2020-03-08 RX ORDER — ONDANSETRON 2 MG/ML
4 INJECTION INTRAMUSCULAR; INTRAVENOUS EVERY 6 HOURS PRN
Status: DISCONTINUED | OUTPATIENT
Start: 2020-03-08 | End: 2020-03-09 | Stop reason: HOSPADM

## 2020-03-08 RX ORDER — ONDANSETRON 4 MG/1
4 TABLET, ORALLY DISINTEGRATING ORAL EVERY 6 HOURS PRN
Status: DISCONTINUED | OUTPATIENT
Start: 2020-03-08 | End: 2020-03-09 | Stop reason: HOSPADM

## 2020-03-08 RX ORDER — LOSARTAN POTASSIUM 50 MG/1
50 TABLET ORAL DAILY
Status: DISCONTINUED | OUTPATIENT
Start: 2020-03-08 | End: 2020-03-09 | Stop reason: HOSPADM

## 2020-03-08 RX ORDER — SERTRALINE HYDROCHLORIDE 100 MG/1
100 TABLET, FILM COATED ORAL DAILY
Status: DISCONTINUED | OUTPATIENT
Start: 2020-03-08 | End: 2020-03-09 | Stop reason: HOSPADM

## 2020-03-08 RX ORDER — ONDANSETRON 2 MG/ML
4 INJECTION INTRAMUSCULAR; INTRAVENOUS ONCE
Status: COMPLETED | OUTPATIENT
Start: 2020-03-08 | End: 2020-03-08

## 2020-03-08 RX ORDER — FERROUS SULFATE 325(65) MG
325 TABLET ORAL 2 TIMES DAILY
Status: DISCONTINUED | OUTPATIENT
Start: 2020-03-08 | End: 2020-03-09 | Stop reason: HOSPADM

## 2020-03-08 RX ORDER — ONDANSETRON 4 MG/1
4 TABLET, FILM COATED ORAL EVERY 8 HOURS PRN
Status: DISCONTINUED | OUTPATIENT
Start: 2020-03-08 | End: 2020-03-09 | Stop reason: HOSPADM

## 2020-03-08 RX ORDER — LORAZEPAM 0.5 MG/1
0.5 TABLET ORAL EVERY 6 HOURS PRN
Status: DISCONTINUED | OUTPATIENT
Start: 2020-03-08 | End: 2020-03-09 | Stop reason: HOSPADM

## 2020-03-08 RX ORDER — SENNOSIDES 8.6 MG
1 TABLET ORAL DAILY
Status: DISCONTINUED | OUTPATIENT
Start: 2020-03-08 | End: 2020-03-09 | Stop reason: HOSPADM

## 2020-03-08 RX ORDER — NYSTATIN 100000 [USP'U]/G
POWDER TOPICAL 2 TIMES DAILY PRN
COMMUNITY
End: 2021-09-09

## 2020-03-08 RX ORDER — IBUPROFEN 600 MG/1
600 TABLET, FILM COATED ORAL ONCE
Status: COMPLETED | OUTPATIENT
Start: 2020-03-08 | End: 2020-03-08

## 2020-03-08 RX ORDER — NICOTINE POLACRILEX 4 MG
15-30 LOZENGE BUCCAL
Status: DISCONTINUED | OUTPATIENT
Start: 2020-03-08 | End: 2020-03-09 | Stop reason: HOSPADM

## 2020-03-08 RX ORDER — IPRATROPIUM BROMIDE AND ALBUTEROL SULFATE 2.5; .5 MG/3ML; MG/3ML
3 SOLUTION RESPIRATORY (INHALATION)
Status: COMPLETED | OUTPATIENT
Start: 2020-03-08 | End: 2020-03-08

## 2020-03-08 RX ORDER — METOLAZONE 2.5 MG/1
2.5 TABLET ORAL DAILY PRN
Status: ON HOLD | COMMUNITY
End: 2020-03-09

## 2020-03-08 RX ORDER — PROCHLORPERAZINE 25 MG
25 SUPPOSITORY, RECTAL RECTAL EVERY 12 HOURS PRN
Status: DISCONTINUED | OUTPATIENT
Start: 2020-03-08 | End: 2020-03-08

## 2020-03-08 RX ORDER — BUDESONIDE 0.5 MG/2ML
0.5 INHALANT ORAL 2 TIMES DAILY PRN
COMMUNITY

## 2020-03-08 RX ORDER — MAGNESIUM OXIDE 400 MG/1
400 TABLET ORAL DAILY
Status: DISCONTINUED | OUTPATIENT
Start: 2020-03-08 | End: 2020-03-09 | Stop reason: HOSPADM

## 2020-03-08 RX ORDER — NALOXONE HYDROCHLORIDE 0.4 MG/ML
.1-.4 INJECTION, SOLUTION INTRAMUSCULAR; INTRAVENOUS; SUBCUTANEOUS
Status: DISCONTINUED | OUTPATIENT
Start: 2020-03-08 | End: 2020-03-09 | Stop reason: HOSPADM

## 2020-03-08 RX ORDER — METFORMIN HCL 500 MG
1000 TABLET, EXTENDED RELEASE 24 HR ORAL
Status: DISCONTINUED | OUTPATIENT
Start: 2020-03-08 | End: 2020-03-09 | Stop reason: HOSPADM

## 2020-03-08 RX ORDER — IPRATROPIUM BROMIDE AND ALBUTEROL SULFATE 2.5; .5 MG/3ML; MG/3ML
1 SOLUTION RESPIRATORY (INHALATION)
Status: DISCONTINUED | OUTPATIENT
Start: 2020-03-08 | End: 2020-03-09 | Stop reason: HOSPADM

## 2020-03-08 RX ORDER — METHYLPREDNISOLONE SODIUM SUCCINATE 125 MG/2ML
125 INJECTION, POWDER, LYOPHILIZED, FOR SOLUTION INTRAMUSCULAR; INTRAVENOUS ONCE
Status: COMPLETED | OUTPATIENT
Start: 2020-03-08 | End: 2020-03-08

## 2020-03-08 RX ORDER — METHOTREXATE SODIUM 25 MG/ML
INJECTION, SOLUTION INTRA-ARTERIAL; INTRAMUSCULAR; INTRAVENOUS
COMMUNITY
End: 2021-03-10

## 2020-03-08 RX ORDER — TORSEMIDE 20 MG/1
80 TABLET ORAL 2 TIMES DAILY
COMMUNITY
End: 2021-09-09

## 2020-03-08 RX ADMIN — SERTRALINE HYDROCHLORIDE 100 MG: 100 TABLET ORAL at 21:09

## 2020-03-08 RX ADMIN — FOLIC ACID 1 MG: 1 TABLET ORAL at 21:09

## 2020-03-08 RX ADMIN — SODIUM CHLORIDE 500 ML: 9 INJECTION, SOLUTION INTRAVENOUS at 12:41

## 2020-03-08 RX ADMIN — METHYLPREDNISOLONE SODIUM SUCCINATE 125 MG: 125 INJECTION, POWDER, FOR SOLUTION INTRAMUSCULAR; INTRAVENOUS at 11:41

## 2020-03-08 RX ADMIN — FAMOTIDINE 20 MG: 20 TABLET ORAL at 21:09

## 2020-03-08 RX ADMIN — SENNOSIDES 1 TABLET: 8.6 TABLET, FILM COATED ORAL at 21:08

## 2020-03-08 RX ADMIN — IBUPROFEN 600 MG: 600 TABLET, FILM COATED ORAL at 11:41

## 2020-03-08 RX ADMIN — Medication 400 MG: at 21:09

## 2020-03-08 RX ADMIN — INSULIN ASPART 2 UNITS: 100 INJECTION, SOLUTION INTRAVENOUS; SUBCUTANEOUS at 23:44

## 2020-03-08 RX ADMIN — SODIUM CHLORIDE: 9 INJECTION, SOLUTION INTRAVENOUS at 21:12

## 2020-03-08 RX ADMIN — IPRATROPIUM BROMIDE AND ALBUTEROL SULFATE 3 ML: .5; 3 SOLUTION RESPIRATORY (INHALATION) at 11:30

## 2020-03-08 RX ADMIN — IPRATROPIUM BROMIDE AND ALBUTEROL SULFATE 3 ML: .5; 3 SOLUTION RESPIRATORY (INHALATION) at 20:43

## 2020-03-08 RX ADMIN — FERROUS SULFATE TAB 325 MG (65 MG ELEMENTAL FE) 325 MG: 325 (65 FE) TAB at 21:09

## 2020-03-08 RX ADMIN — ONDANSETRON 4 MG: 2 INJECTION INTRAMUSCULAR; INTRAVENOUS at 11:41

## 2020-03-08 RX ADMIN — METFORMIN HYDROCHLORIDE 1000 MG: 500 TABLET, EXTENDED RELEASE ORAL at 21:09

## 2020-03-08 RX ADMIN — ACETAMINOPHEN 650 MG: 325 TABLET, FILM COATED ORAL at 21:37

## 2020-03-08 RX ADMIN — ACETAMINOPHEN 1000 MG: 500 TABLET, FILM COATED ORAL at 11:17

## 2020-03-08 RX ADMIN — BUDESONIDE 0.5 MG: 0.5 INHALANT RESPIRATORY (INHALATION) at 20:43

## 2020-03-08 RX ADMIN — IPRATROPIUM BROMIDE AND ALBUTEROL SULFATE 3 ML: .5; 3 SOLUTION RESPIRATORY (INHALATION) at 11:38

## 2020-03-08 RX ADMIN — IPRATROPIUM BROMIDE AND ALBUTEROL SULFATE 3 ML: .5; 3 SOLUTION RESPIRATORY (INHALATION) at 11:32

## 2020-03-08 RX ADMIN — LOSARTAN POTASSIUM 50 MG: 50 TABLET, FILM COATED ORAL at 21:09

## 2020-03-08 ASSESSMENT — ENCOUNTER SYMPTOMS
VOMITING: 1
BACK PAIN: 0
HEADACHES: 0
DIARRHEA: 0
SORE THROAT: 0
ABDOMINAL PAIN: 0
SHORTNESS OF BREATH: 1
NAUSEA: 1
RHINORRHEA: 1
FREQUENCY: 0
BLOOD IN STOOL: 0
DYSURIA: 0
COUGH: 1
CHILLS: 1
CONSTIPATION: 0
DIFFICULTY URINATING: 0
FEVER: 1

## 2020-03-08 ASSESSMENT — MIFFLIN-ST. JEOR: SCORE: 2146.26

## 2020-03-08 NOTE — PLAN OF CARE
PRIMARY DIAGNOSIS: ASTHMA  OUTPATIENT/OBSERVATION GOALS TO BE MET BEFORE DISCHARGE:  1. Vital signs stable: Yes, soft BPs, requiring 5L o2, baseline 3L    2. Improvement of peak flow to greater than 70% sustained off nebulizer for 4 hours: NO    3. Dyspnea improved and O2 sats >88% at RA or at prior home O2 therapy level: No      SpO2: 97 %, O2 Device: 5LNasal cannula    4. Short term supplemental O2 needed for use with activity at home: TBD    5. Tolerating adequate PO diet and medications: Yes, nausea improved    6. Return to near baseline physical activity: Yes    Discharge Planner Nurse   Safe discharge environment identified: Yes  Barriers to discharge: No       Entered by: Genesis Carbone 03/08/2020 5:17 PM     Please review provider order for any additional goals.   Nurse to notify provider when observation goals have been met and patient is ready for discharge.    Pt is alert and oriented. States her biggest complaint was nausea, which has now improved. Pt is on 5L face mask, baseline O2 is 3L. Pt brought her BiPap from home. Plan is for Nebs and am labs.

## 2020-03-08 NOTE — PROGRESS NOTES
ROOM # [id number]    Living Situation (if not independent, order SW consult): with  and kids  Facility name:  :  [facility]    Activity level at baseline: ind with walker or cane, unable to carry   Activity level on admit: Up with 1 and walker      Patient registered to observation; given Patient Bill of Rights; given the opportunity to ask questions about observation status and their plan of care.  Patient has been oriented to the observation room, bathroom and call light is in place.    Discussed discharge goals and expectations with patient/family.
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
## 2020-03-08 NOTE — ED TRIAGE NOTES
ABCs intact. Pt c/o fever, chills, nausea today. Pt has had a cough x 3 days. Pt had mouth surgery this week.

## 2020-03-08 NOTE — ED PROVIDER NOTES
History     Chief Complaint:  Flu Symptoms    HPI   Mirta Decker is a 55 year old, with a history of psoriatic arthritis (currently not on immunosuppressants), hypertension, asthma - on 3L chronic nasal cannula, and type II diabetes who presents for evaluation of flu symptoms.  Patient states this morning developing fever, nausea and vomiting.  Her  states she has had a fluid like cough over the past few days as well as worsening dyspnea.  Patient denies any chest pain, abdominal pain, diarrhea, urinary symptoms or other complaints.  She states that she recently underwent dental procedure with denture placement nearly a week ago.  No known sick contacts.  No recent antibiotics, hospitalizations.  She has not taken anything for her fever today.    Allergies:  Penicillins  Lisinopril    Medications:    Sertraline  Pepcid  Ativan PRN  Losartan  magnesium oxide  Chlorhexidine  Senna  Ferrous sulfate  Albuterol inhaler  Zofran PRN   Metolazone   Pulmicort neb  DuoNeb  Torsemide  Metformin     Past Medical History:    Anxiety & Depression   Psoriatic arthritis  Multiple vertebral fractures  Hypertension   Blood transfusion history   Cardiomegaly   Type II diabetes  Claustrophobia  Right hemothorax   Lymphedema   Anemia  Asthma     Past Surgical History:    Appendectomy    x2  Thoracic surgery   Back surgery   Oral surgery     Family History:    Arthritis  Diabetes  CAD  Hypertension  Thyroid disease    Social History:  Marital Status:   [2]  Presents with family member.   Negative for tobacco use.  Positive for occasional alcohol use.   Negative for drug use.      Review of Systems   Constitutional: Positive for chills and fever.   HENT: Positive for congestion and rhinorrhea. Negative for sore throat.    Respiratory: Positive for cough and shortness of breath.    Cardiovascular: Negative for chest pain.   Gastrointestinal: Positive for nausea and vomiting. Negative for abdominal pain, blood in  stool, constipation and diarrhea.   Genitourinary: Negative for difficulty urinating, dysuria and frequency.   Musculoskeletal: Negative for back pain.   Skin: Negative for rash.   Neurological: Negative for headaches.   All other systems reviewed and are negative.    Physical Exam     Patient Vitals for the past 24 hrs:   BP Temp Temp src Pulse Heart Rate Resp SpO2 Weight   03/08/20 1315 118/58 -- -- 102 120 23 94 % --   03/08/20 1300 110/50 -- -- 102 101 17 93 % --   03/08/20 1245 113/56 -- -- 105 105 (!) 6 94 % --   03/08/20 1230 117/62 -- -- 112 117 12 95 % --   03/08/20 1215 (!) 148/54 -- -- 111 110 16 96 % --   03/08/20 1200 (!) 167/72 -- -- 112 122 10 97 % --   03/08/20 1155 -- -- -- -- 106 8 98 % --   03/08/20 1150 -- -- -- 108 -- -- 99 % --   03/08/20 1145 (!) 155/92 -- -- -- -- -- 100 % --   03/08/20 1140 -- -- -- -- -- -- 98 % --   03/08/20 1135 -- -- -- -- -- -- 99 % --   03/08/20 1132 -- -- -- -- -- 26 100 % --   03/08/20 1130 -- -- -- -- -- 24 100 % --   03/08/20 1125 -- -- -- -- -- -- 100 % --   03/08/20 1120 -- -- -- -- -- -- 100 % --   03/08/20 1115 (!) 151/84 -- -- -- -- -- 100 % --   03/08/20 1113 (!) 151/84 102.6  F (39.2  C) Oral -- -- -- (!) 75 % (!) 161.9 kg (357 lb)   03/08/20 1110 (!) 151/84 -- -- 118 -- -- (!) 72 % --   03/08/20 1100 -- 100.8  F (38.2  C) Temporal 131 -- 28 -- --      Physical Exam  Nursing note and vitals reviewed.  Constitutional: Ill appearing on arrival; moderate respiratory distress  Eyes: Conjunctiva normal.  Pupils are equal, round, and reactive to light.   ENT: Nose normal. Mucous membranes pink and moist.    Neck: Normal range of motion.  CVS: Normal rate, regular rhythm.  Normal heart sounds.  No murmur.  Pulmonary: Lungs diminished bilaterally  GI: Morbidly obese. Limited exam secondary to girth.  Abdomen soft. Nontender, nondistended. No rigidity or guarding.    MSK: No calf tenderness; +1 LE edema  Neuro: Alert. Follows simple commands.  Skin: Skin is warm and  dry. No rash noted.   Psychiatric: Normal affect.     Emergency Department Course   ECG:  Indication: Shortness of Breath  Time: 1146  Vent. Rate 125 bpm. NY interval 200. QRS duration 98. QT/QTc 326/470. P-R-T axis * -19 106.    Sinus tachycardia.  ST and T wave abnormality, consider lateral ischemia.  Abnormal ECG. Read time: 1147.    Imaging:  Radiographic findings were communicated with the patient and family who voiced understanding of the findings.  XR Chest Port 1 view:   Hypoinflation of both lungs. Cardiac and mediastinal silhouettes are unremarkable. Multiple old healed bilateral rib fracture deformities. No pleural effusion, pneumothorax or focal consolidation, as per radiology.     Laboratory:  CBC: WBC: 17.0 (H), HGB: 11.9, PLT: 22  CMP: Glucose 175 (H), CO2: 35 (H), Albumin: 3.2 (L), o/w WNL (Creatinine: 0.93)  1158 Lactic acid: 2.5 (H)  1309 Lactic acid: 1.7   1138 Troponin: <0.015   BNP: 110  Lipase: 169  1115 Glucose by meter: 158 (H)   Blood gas venous and oxyhgb (Collected: 1127): pCO2: 72 (H), Bicarb: 40 (H), o/w WNL  Blood gas venous and oxyhgb (Collected: 1257): pCO2: 67 (H), Bicarb: 39 (H), o/w WNL     Procalcitonin: Pending     Blood cultures x2: Pending     UA with Microscopic: Mucous: Present (A), o/w WNL  Urine culture: Pending    Influenza A/B: negaitve    Procedures:  None    Interventions:  1117 Tylenol, 1000 mg, PO  1130 DuoNeb, 3 mL, Nebulization  1132 DuoNeb, 3 mL, Nebulization   1138 DuoNeb, 3 mL, Nebulization  1141 Tylenol, 1000 mg, PO   Ibuprofen, 600 mg, PO   Zofran, 4 mg, IV injection   Solu-Medrol, 125 mg, IV injection  1241 0.9% NaCl, 500 mL, IV bolus    Emergency Department Course:  Nursing notes and vitals reviewed.   1105: I performed an exam of the patient as documented above. Patient became hypoxic, 72% on initial presentation when laying flat; oxymask initiated.     IV was inserted and blood was drawn for laboratory testing, results above.   The patient's nose was  swabbed and this sample was sent for influenza antigen, findings above.     1118: Portable chest x-ray obtained in the ED, findings above.      1121: I spoke with RT regarding the patient's presentation and BiPAP needs. Plan for BiPAP for approximately an hour for support, then reassessment on oximask.     1127: After x-ray obtained, patient rechecked and weaned off of oximask, now on nasal cannula    1146: Consulted with Respiratory Therapist. Patient back down to 3 L on nasal cannula. No need for BiPAP.     EKG obtained in the ED, see results above.    A straight cath was used to obtain a urine sample here in the emergency department. This was sent for laboratory testing, findings above.     1315: I rechecked the patient and discussed the results of her workup thus far. Patient continues to appear well on her baseline oxygen requirement.     1358: I consulted with Hamida Hager PA-C of the hospitalist services. She is in agreement to accept the patient for admission on behalf of Dr. Wright.     1415: I updated the patient and  on the rest of the work up, as well as admission.     Findings and plan explained to the Patient who consents to admission. Discussed the patient with Hamida Hager PA-C, who will admit the patient to an observation bed for further monitoring, evaluation, and treatment.    I personally reviewed the laboratory and imaging results with the Patient and spouse and answered all related questions prior to admission.    Impression & Plan    CMS Diagnoses: The Lactic acid level is elevated due to hypoxia and dehydration, at this time there is no sign of severe sepsis or septic shock.  Medical Decision Making:  Patient is a 55-year-old female with significant past medical history notably oxygen, asthma and diabetes presenting for reported flulike symptoms.  Patient ill-appearing, febrile on arrival.  Concern for underlying sepsis.  When patient was laying flat she did become profoundly  hypoxic despite being on her nasal cannula.  She was transferred to a oxygen mask with significant improvements in her oxygenation.  She repeat received breathing treatments as well as steroids given concern for potential reactive airway component.  Chest x-ray without definitive pneumonia, fluid overload, widened mediastinum or pneumothorax.  She does have a nonspecific leukocytosis and is meeting sepsis criteria today though I do feel that this is more secondary to influenza-like illness.  Patient is within the tamiflu window though is requesting to hold off on tamiflu at this time.  She has no significant abdominal tenderness and I doubt intra-abdominal source of infection.  UA without infection.  Patient's initial lactate was elevated though I do suspect this is more secondary to hypoxia and mild dehydration.  She was eventually weaned down to her 3 L nasal cannula which she is chronically on.  I have held off on formal antibiotics at this time as lower suspicion for serious bacterial etiology though blood cultures and procalcitonin have been sent.  Given her medical comorbidities and hypoxia.  She did not experience during her time in the ED I do feel she would benefit from observation with continued breathing treatments at this time.  There is no need for advanced airway support.  There is no evidence to suggest severe sepsis or shock.  Patient accepted by hospitalist for admission.    Critical Care time:  none    Diagnosis:    ICD-10-CM    1. Acute on chronic respiratory failure with hypoxia (H)  J96.21 Procalcitonin     Procalcitonin     CANCELED: Procalcitonin   2. Influenza-like illness  R69        Disposition:  Admitted to obs under the care of Dr. Kyle Hawley Disclosure:  I, Gracie Richardson, am serving as a scribe on 3/8/2020 at 11:04 AM to personally document services performed by Liset Ramos DO based on my observations and the provider's statements to me.       3/8/2020   [facility] [place]  [facility] EMERGENCY DEPARTMENT       Liset Ramos,   03/08/20 [id number]

## 2020-03-08 NOTE — ED NOTES
[facility] [place]  ED Nurse Handoff Report    Mirta Decker is a 55 year old female   ED Chief complaint: Flu Symptoms  . ED Diagnosis:   Final diagnoses:   None     Allergies:   Allergies   Allergen Reactions     Lisinopril      Penicillin G      Penicillins Hives       Code Status: Full Code  Activity level - Baseline/Home:  Stand by Assist. Activity Level - Current:   Assist X 1. Lift room needed: No. Bariatric: No   Needed: No   Isolation: No. Infection: Not Applicable.     Vital Signs:   Vitals:    03/08/20 1155 03/08/20 1200 03/08/20 1215 03/08/20 1230   BP:  (!) 167/72 (!) 148/54 117/62   Pulse:  112 111 112   Resp: 8 10 16 12   Temp:       TempSrc:       SpO2: 98% 97% 96% 95%   Weight:           Cardiac Rhythm:  ,      Pain level: 0-10 Pain Scale: 0  Patient confused: No. Patient Falls Risk: Yes.   Elimination Status: Has voided   Patient Report - Initial Complaint: SOB, wheezing, fever. Focused Assessment: Pt received neb treatments, fluid   Tests Performed: labs, xray.   Abnormal Results:   XR Chest Port 1 View   Final Result   IMPRESSION: Hypoinflation of both lungs. Cardiac and mediastinal   silhouettes are unremarkable. Multiple old healed bilateral rib   fracture deformities. No pleural effusion, pneumothorax or focal   consolidation.      FREDA WITT MD        Labs Ordered and Resulted from Time of ED Arrival Up to the Time of Departure from the ED   LACTIC ACID WHOLE BLOOD - Abnormal; Notable for the following components:       Result Value    Lactic Acid 2.5 (*)     All other components within normal limits   BLOOD GAS VENOUS AND OXYHGB - Abnormal; Notable for the following components:    PCO2 Venous 72 (*)     Bicarbonate Venous 40 (*)     All other components within normal limits   COMPREHENSIVE METABOLIC PANEL - Abnormal; Notable for the following components:    Carbon Dioxide 35 (*)     Glucose 175 (*)     Albumin 3.2 (*)     All other components within normal limits    GLUCOSE BY METER - Abnormal; Notable for the following components:    Glucose 158 (*)     All other components within normal limits   CBC WITH PLATELETS DIFFERENTIAL - Abnormal; Notable for the following components:    WBC 17.0 (*)     MCHC 29.5 (*)     Absolute Neutrophil 15.9 (*)     Absolute Lymphocytes 0.5 (*)     All other components within normal limits   BLOOD GAS VENOUS AND OXYHGB - Abnormal; Notable for the following components:    PCO2 Venous 67 (*)     Bicarbonate Venous 39 (*)     All other components within normal limits   GLUCOSE MONITOR NURSING POCT   TROPONIN I   NT PROBNP INPATIENT   LIPASE   LACTIC ACID WHOLE BLOOD   ROUTINE UA WITH MICROSCOPIC   CARDIAC CONTINUOUS MONITORING   STRAIGHT CATH FOR URINE   BLOOD CULTURE   BLOOD CULTURE   INFLUENZA A/B ANTIGEN   URINE CULTURE AEROBIC BACTERIAL     .   Treatments provided: nebs, fluids  Family Comments:  at bedside  OBS brochure/video discussed/provided to patient:  Yes  ED Medications:   Medications   ipratropium - albuterol 0.5 mg/2.5 mg/3 mL (DUONEB) neb solution 3 mL (3 mLs Nebulization Given 3/8/20 1138)   acetaminophen (TYLENOL) tablet 1,000 mg (1,000 mg Oral Given 3/8/20 1117)   ibuprofen (ADVIL/MOTRIN) tablet 600 mg (600 mg Oral Given 3/8/20 1141)   ondansetron (ZOFRAN) injection 4 mg (4 mg Intravenous Given 3/8/20 1141)   methylPREDNISolone sodium succinate (solu-MEDROL) injection 125 mg (125 mg Intravenous Given 3/8/20 1141)   0.9% sodium chloride BOLUS (0 mLs Intravenous Stopped 3/8/20 1318)     Drips infusing:  No  For the majority of the shift, the patient's behavior Green. Interventions performed were NA.    Sepsis treatment initiated: No       ED Nurse Name/Phone Number: Sarah Yanez RN,   1:18 PM    RECEIVING UNIT ED HANDOFF REVIEW    Above ED Nurse Handoff Report was reviewed: Yes  Reviewed by: Genesis Carbone RN on March 8, 2020 at 2:40 PM

## 2020-03-09 ENCOUNTER — COMMUNICATION - HEALTHEAST (OUTPATIENT)
Dept: PULMONOLOGY | Facility: OTHER | Age: 55
End: 2020-03-09

## 2020-03-09 VITALS
BODY MASS INDEX: 59.07 KG/M2 | RESPIRATION RATE: 20 BRPM | SYSTOLIC BLOOD PRESSURE: 112 MMHG | WEIGHT: 293 LBS | OXYGEN SATURATION: 96 % | TEMPERATURE: 96.8 F | HEART RATE: 59 BPM | HEIGHT: 59 IN | DIASTOLIC BLOOD PRESSURE: 61 MMHG

## 2020-03-09 LAB
ANION GAP SERPL CALCULATED.3IONS-SCNC: 3 MMOL/L (ref 3–14)
BACTERIA SPEC CULT: NO GROWTH
BUN SERPL-MCNC: 24 MG/DL (ref 7–30)
CALCIUM SERPL-MCNC: 8.7 MG/DL (ref 8.5–10.1)
CHLORIDE SERPL-SCNC: 101 MMOL/L (ref 94–109)
CO2 SERPL-SCNC: 35 MMOL/L (ref 20–32)
CREAT SERPL-MCNC: 1.08 MG/DL (ref 0.52–1.04)
ERYTHROCYTE [DISTWIDTH] IN BLOOD BY AUTOMATED COUNT: 15 % (ref 10–15)
GFR SERPL CREATININE-BSD FRML MDRD: 57 ML/MIN/{1.73_M2}
GLUCOSE BLDC GLUCOMTR-MCNC: 134 MG/DL (ref 70–99)
GLUCOSE BLDC GLUCOMTR-MCNC: 190 MG/DL (ref 70–99)
GLUCOSE SERPL-MCNC: 161 MG/DL (ref 70–99)
HCT VFR BLD AUTO: 35.1 % (ref 35–47)
HGB BLD-MCNC: 10.3 G/DL (ref 11.7–15.7)
INTERPRETATION ECG - MUSE: NORMAL
Lab: NORMAL
MCH RBC QN AUTO: 26.9 PG (ref 26.5–33)
MCHC RBC AUTO-ENTMCNC: 29.3 G/DL (ref 31.5–36.5)
MCV RBC AUTO: 92 FL (ref 78–100)
PLATELET # BLD AUTO: 203 10E9/L (ref 150–450)
POTASSIUM SERPL-SCNC: 3.8 MMOL/L (ref 3.4–5.3)
RBC # BLD AUTO: 3.83 10E12/L (ref 3.8–5.2)
SODIUM SERPL-SCNC: 139 MMOL/L (ref 133–144)
SPECIMEN SOURCE: NORMAL
WBC # BLD AUTO: 18 10E9/L (ref 4–11)

## 2020-03-09 PROCEDURE — 99217 ZZC OBSERVATION CARE DISCHARGE: CPT | Performed by: PHYSICIAN ASSISTANT

## 2020-03-09 PROCEDURE — 94640 AIRWAY INHALATION TREATMENT: CPT

## 2020-03-09 PROCEDURE — 25000125 ZZHC RX 250: Performed by: PHYSICIAN ASSISTANT

## 2020-03-09 PROCEDURE — 00000146 ZZHCL STATISTIC GLUCOSE BY METER IP

## 2020-03-09 PROCEDURE — 25000132 ZZH RX MED GY IP 250 OP 250 PS 637: Performed by: PHYSICIAN ASSISTANT

## 2020-03-09 PROCEDURE — 40000275 ZZH STATISTIC RCP TIME EA 10 MIN

## 2020-03-09 PROCEDURE — 85027 COMPLETE CBC AUTOMATED: CPT | Performed by: PHYSICIAN ASSISTANT

## 2020-03-09 PROCEDURE — G0378 HOSPITAL OBSERVATION PER HR: HCPCS

## 2020-03-09 PROCEDURE — 80048 BASIC METABOLIC PNL TOTAL CA: CPT | Performed by: PHYSICIAN ASSISTANT

## 2020-03-09 PROCEDURE — 36415 COLL VENOUS BLD VENIPUNCTURE: CPT | Performed by: PHYSICIAN ASSISTANT

## 2020-03-09 RX ORDER — LOSARTAN POTASSIUM 50 MG/1
50 TABLET ORAL DAILY
Qty: 90 TABLET | Refills: 1 | Status: SHIPPED | OUTPATIENT
Start: 2020-03-09 | End: 2020-08-14

## 2020-03-09 RX ORDER — METOLAZONE 2.5 MG/1
2.5 TABLET ORAL DAILY PRN
Start: 2020-03-09 | End: 2022-10-10

## 2020-03-09 RX ADMIN — FERROUS SULFATE TAB 325 MG (65 MG ELEMENTAL FE) 325 MG: 325 (65 FE) TAB at 07:54

## 2020-03-09 RX ADMIN — FAMOTIDINE 20 MG: 20 TABLET ORAL at 07:54

## 2020-03-09 RX ADMIN — Medication 400 MG: at 07:54

## 2020-03-09 RX ADMIN — SENNOSIDES 1 TABLET: 8.6 TABLET, FILM COATED ORAL at 07:54

## 2020-03-09 RX ADMIN — BUDESONIDE 0.5 MG: 0.5 INHALANT RESPIRATORY (INHALATION) at 07:57

## 2020-03-09 RX ADMIN — SERTRALINE HYDROCHLORIDE 100 MG: 100 TABLET ORAL at 07:55

## 2020-03-09 RX ADMIN — IPRATROPIUM BROMIDE AND ALBUTEROL SULFATE 3 ML: .5; 3 SOLUTION RESPIRATORY (INHALATION) at 07:57

## 2020-03-09 RX ADMIN — FOLIC ACID 1 MG: 1 TABLET ORAL at 07:54

## 2020-03-09 NOTE — PLAN OF CARE
PRIMARY DIAGNOSIS: Hypoxia, ASTHMA  OUTPATIENT/OBSERVATION GOALS TO BE MET BEFORE DISCHARGE:  1. Vital signs stable: Yes    2. Improvement of peak flow to greater than 70% sustained off nebulizer for 4 hours: Yes    3. Dyspnea improved and O2 sats >88% at RA or at prior home O2 therapy level: Yes      SpO2: 94 %, O2 Device: BiPAP/CPAP on 3 L of oxygen    4. Short term supplemental O2 needed for use with activity at home: Uses 3 L of O2 baseline at home    5. Tolerating adequate PO diet and medications: Yes    6. Return to near baseline physical activity: No    Discharge Planner Nurse   Safe discharge environment identified: Yes  Barriers to discharge: Yes       Entered by: Lester Thibodeaux 03/09/2020 4:51 AM     Vitals are Temp: 96.8  F (36  C) Temp src: Oral BP: 106/53 Pulse: 82 Heart Rate: 66 Resp: 18 SpO2: 94 %.  Patient is Alert and Oriented x4. They are 1 Assist with Gait Belt and Walker.  Pt is a Regular diet. Denies pain. Patient has Normal Saline 0.9% running at 100 mL per hour. BiPAP set up by RT and in use with 3 L of oxygen bleed in. Pt denies SOB, dizziness, chest pain, and nausea. LS diminished. BS active. Last BM 3/7/2020. CMS intact. 2 AM . Daughter at bedside. Stable and resting in bed. Will continue to monitor and provide supportive cares.         Please review provider order for any additional goals.   Nurse to notify provider when observation goals have been met and patient is ready for discharge.

## 2020-03-09 NOTE — PLAN OF CARE
PRIMARY DIAGNOSIS: ASTHMA  OUTPATIENT/OBSERVATION GOALS TO BE MET BEFORE DISCHARGE:  1. Vital signs stable: Yes    2. Improvement of peak flow to greater than 70% sustained off nebulizer for 4 hours: Yes    3. Dyspnea improved and O2 sats >88% at RA or at prior home O2 therapy level: Yes      SpO2: 96 %, O2 Device: BiPAP/CPAP(home machine)    4. Short term supplemental O2 needed for use with activity at home: No    5. Tolerating adequate PO diet and medications: Yes    6. Return to near baseline physical activity: Yes    Discharge Planner Nurse   Safe discharge environment identified: Yes  Barriers to discharge: No       Entered by: Genesis Carbone 03/09/2020 10:52 AM     Please review provider order for any additional goals.   Nurse to notify provider when observation goals have been met and patient is ready for discharge.    Pt is alert and oriented. Denies pain or nausea. Denies SOB or wheezing above baseline. Pt wears biPap whenever she is lying down, chronic O2 3L. Pt has cracked, dry skin on her back, her gluteal cleft is red and excoriated. Her bottom is bleeding when wiped, pt states she has a known hemorrhoid. Pt is red and excoriated under breasts and in armpits. Pt declined intervention stating she wants to shower at home and has powder at home. Declined intervention.

## 2020-03-09 NOTE — PROGRESS NOTES
X-COVER    Patient on home BiPAP + 5L O2 satting 93%, awake and alert.  ABG performed with curious results:  PH 7.41, pCO2 57, pO2 42, Bicarb 36, base excess 9.3.  Perhaps is venous, not arterial?  Discussed with MD and will get a repeat VBG to confirm.      [facility]

## 2020-03-09 NOTE — PROGRESS NOTES
An ABG was completed on the pt's left radial @ 2018 on an FIO2 of 5L oxymask with no complications noted during the procedure.    Nino Santizo, RT on 3/8/2020 at 8:22 PM

## 2020-03-09 NOTE — PROGRESS NOTES
OBSERVATION patient END time: 1116    Patient's After Visit Summary was reviewed with patient and/or family.   Patient verbalized understanding of After Visit Summary, recommended follow up and was given an opportunity to ask questions.   Discharge medications sent home with patient/family: Not applicable   Discharged with daughter

## 2020-03-09 NOTE — PLAN OF CARE
PRIMARY DIAGNOSIS: ASTHMA  OUTPATIENT/OBSERVATION GOALS TO BE MET BEFORE DISCHARGE:  1. Vital signs stable: Yes    2. Improvement of peak flow to greater than 70% sustained off nebulizer for 4 hours: Yes    3. Dyspnea improved and O2 sats >88% at RA or at prior home O2 therapy level: Yes      SpO2: 94 %, O2 Device: BiPAP/CPAP    4. Short term supplemental O2 needed for use with activity at home: Uses O2 at home baseline    5. Tolerating adequate PO diet and medications: Yes    6. Return to near baseline physical activity: No    Discharge Planner Nurse   Safe discharge environment identified: Yes  Barriers to discharge: Yes       Entered by: Lester Thibodeaux 03/08/2020 10:45 PM     Please review provider order for any additional goals.   Nurse to notify provider when observation goals have been met and patient is ready for discharge.

## 2020-03-09 NOTE — PHARMACY-ADMISSION MEDICATION HISTORY
Admission medication history interview status for this patient is complete. See [facility] admission navigator for allergy information, prior to admission medications and immunization status.     Medication history interview source(s):Patient  Medication history resources (including written lists, pill bottles, clinic record): Care Everywhere records & Rx refill hx.  [facility]    Changes made to PTA medication list:  Added:  Methorexate dose  Deleted:  Tylenol 650mg  Changed:   - Albuterol HFA, Pulmicort, Dovonox cream, Metolazone, Duoneb, Nystatin, Tylenol 325mg ----> PRN  - Torsemide 80mg qam & 60mg q2pm ---> 80mg bid     Actions taken by pharmacist (provider contacted, etc):None     Additional medication history information:None    Medication reconciliation/reorder completed by provider prior to medication history?  Yes     Do you take OTC medications (eg tylenol, ibuprofen, fish oil, eye/ear drops, etc)? Yes     For patients on insulin therapy:  No      Prior to Admission medications    Medication Sig Last Dose Taking? Auth Provider   acetaminophen (TYLENOL) 325 MG tablet Take 650 mg by mouth every 6 hours as needed for mild pain prn Yes Unknown, Entered By History   albuterol (PROAIR HFA) 108 (90 Base) MCG/ACT inhaler Inhale 2 puffs into the lungs every 6 hours as needed  prn Yes Reported, Patient   apremilast (OTEZLA) 30 MG tablet Take 1 tablet by mouth 2 times daily 3/7/2020 Yes Reported, Patient   ascorbic acid (EQL VITAMIN C) 500 MG TABS Take 1 tablet (500 mg) by mouth daily With iron 3/7/2020 Yes Yani Martin MD   budesonide (PULMICORT) 0.5 MG/2ML neb solution Take 0.5 mg by nebulization 2 times daily as needed prn Yes Unknown, Entered By History   calcipotriene (DOVONOX) 0.005 % external cream Apply topically nightly as needed prn Yes Unknown, Entered By History   cetirizine (ZYRTEC) 10 MG tablet TAKE 1 TABLET BY MOUTH ONE TIME DAILY 3/7/2020 at am Yes Yani Martin MD   famotidine  (PEPCID) 20 MG tablet Take 1 tablet (20 mg) by mouth 2 times daily 3/7/2020 at pm Yes Yani Martin MD   ferrous sulfate (FEROSUL) 325 (65 Fe) MG tablet Take 1 tablet (325 mg) by mouth daily 3/7/2020 at Unknown time Yes Yani Martin MD   fluticasone (FLONASE) 50 MCG/ACT nasal spray Spray 1 spray into both nostrils daily 3/7/2020 at Unknown time Yes Yani Martin MD   folic acid (FOLVITE) 1 MG tablet 1 tablet (1 mg) daily 3/7/2020 at Unknown time Yes Yani Martin MD   ipratropium - albuterol 0.5 mg/2.5 mg/3 mL (DUONEB) 0.5-2.5 (3) MG/3ML neb solution Take 1 vial by nebulization every 4 hours as needed for shortness of breath / dyspnea or wheezing prn Yes Unknown, Entered By History   LORAZEPAM PO Take 0.5 mg by mouth every 6 hours as needed for anxiety prn Yes Reported, Patient   losartan (COZAAR) 50 MG tablet Take 1 tablet (50 mg) by mouth daily 3/7/2020 at   Yes Yani Martin MD   magnesium oxide (MAG-OX) 400 MG tablet Take 1 tablet (400 mg) by mouth daily 3/7/2020 at   Yes Yani Martin MD   metFORMIN (GLUCOPHAGE-XR) 500 MG 24 hr tablet Take 2 tablets (1,000 mg) by mouth daily (with dinner) 3/7/2020 at pm Yes Yani Martin MD   methotrexate 250 MG/10ML SOLN injection 0.4ml once a week Past Month at   Yes Unknown, Entered By History   metolazone (ZAROXOLYN) 2.5 MG tablet Take 2.5 mg by mouth daily as needed , to take every 3-5 days as needed prn Yes Unknown, Entered By History   Multiple Vitamins-Minerals (CENTROVITE) TABS Take 1 tablet by mouth daily 3/7/2020 at   Yes Yani Martin MD   nystatin (MYCOSTATIN) 765677 UNIT/GM external powder Apply topically 2 times daily as needed , under breasts prn Yes Unknown, Entered By History   ondansetron (ZOFRAN) 4 MG tablet Take 1 tablet (4 mg) by mouth every 8 hours as needed for nausea prn Yes Yani Martin MD   polyethylene glycol (MIRALAX/GLYCOLAX) Packet Take 17 g by mouth daily 3/7/2020 at   Yes Yani Martin MD senna (SM SENNA  LAXATIVE) 8.6 MG tablet Take 1 tablet by mouth daily 3/7/2020 at   Yes Yani Martin MD   sertraline (ZOLOFT) 100 MG tablet TAKE 1 TABLET BY MOUTH ONE TIME DAILY 3/7/2020 at   Yes Yani Martin MD   torsemide (DEMADEX) 20 MG tablet Take 80 mg by mouth 2 times daily , takes in the morning & at 2pm 3/7/2020 at   Yes Unknown, Entered By History   blood glucose (ACCU-CHEK GUIDE) test strip Use to test blood sugar 3 times daily or as directed.   Yani Martin MD   blood glucose monitoring (ACCU-CHEK FASTCLIX) lancets Test 4 times a day   Yani Martin MD   order for DME 1: Custom BLE knee high compression stockings; 2: Custom kevin compression pant; 3; BLE knee high 20-30 mm Gh compression stockings   Randy Terrazas MD

## 2020-03-09 NOTE — PROGRESS NOTES
X-COVER    Notified by RN of increasing O2 requirements.  Patient is on 3L chronically at home.  Is requiring 5L to keep sats > 89% in-house.  VBG in ED showed a compensated respiratory acidosis.  Patient has her BiPAP machine here but we are unable to hook it up to O2 because of some issues with the connections.  RT called.  In the meantime, will get a repeat ABG to ensure patient remains compensated.      [facility] PAC

## 2020-03-10 ENCOUNTER — COMMUNICATION - HEALTHEAST (OUTPATIENT)
Dept: PULMONOLOGY | Facility: OTHER | Age: 55
End: 2020-03-10
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
## 2020-03-11 ENCOUNTER — OFFICE VISIT (OUTPATIENT)
Dept: FAMILY MEDICINE | Facility: CLINIC | Age: 55
End: 2020-03-11

## 2020-03-11 VITALS
HEART RATE: 88 BPM | RESPIRATION RATE: 20 BRPM | OXYGEN SATURATION: 90 % | DIASTOLIC BLOOD PRESSURE: 62 MMHG | SYSTOLIC BLOOD PRESSURE: 108 MMHG | TEMPERATURE: 99.3 F

## 2020-03-11 DIAGNOSIS — J96.01 ACUTE RESPIRATORY FAILURE WITH HYPOXIA (H): ICD-10-CM

## 2020-03-11 DIAGNOSIS — L03.115 CELLULITIS OF RIGHT LOWER EXTREMITY: Primary | ICD-10-CM

## 2020-03-11 DIAGNOSIS — L40.50 PSORIATIC ARTHROPATHY (H): ICD-10-CM

## 2020-03-11 DIAGNOSIS — R60.0 BILATERAL LEG EDEMA: ICD-10-CM

## 2020-03-11 LAB
% GRANULOCYTES: 82.2 %
HCT VFR BLD AUTO: 32.6 % (ref 35–47)
HEMOGLOBIN: 10.3 G/DL (ref 11.7–15.7)
LYMPHOCYTES NFR BLD AUTO: 13.1 %
MCH RBC QN AUTO: 27.8 PG (ref 26–33)
MCHC RBC AUTO-ENTMCNC: 31.6 G/DL (ref 31–36)
MCV RBC AUTO: 88 FL (ref 78–100)
MONOCYTES NFR BLD AUTO: 4.7 %
PLATELET COUNT - QUEST: 184 10^9/L (ref 150–375)
RBC # BLD AUTO: 3.7 10*12/L (ref 3.8–5.2)
WBC # BLD AUTO: 12.3 10*9/L (ref 4–11)

## 2020-03-11 PROCEDURE — 85025 COMPLETE CBC W/AUTO DIFF WBC: CPT | Performed by: FAMILY MEDICINE

## 2020-03-11 PROCEDURE — 36415 COLL VENOUS BLD VENIPUNCTURE: CPT | Performed by: FAMILY MEDICINE

## 2020-03-11 PROCEDURE — 99495 TRANSJ CARE MGMT MOD F2F 14D: CPT | Performed by: FAMILY MEDICINE

## 2020-03-11 RX ORDER — CEPHALEXIN 500 MG/1
500 CAPSULE ORAL 3 TIMES DAILY
Qty: 30 CAPSULE | Refills: 0 | Status: SHIPPED | OUTPATIENT
Start: 2020-03-11 | End: 2020-03-25

## 2020-03-11 NOTE — PATIENT INSTRUCTIONS
Cellulitis of right lower extremity  (primary encounter diagnosis)  Comment: gentle skin care  Plan: CL AFF HEMOGRAM/PLATE/DIFF (BFP), VENOUS         COLLECTION, cephALEXin (KEFLEX) 500 MG capsule        Moisturize. Potential medication side effects were discussed with the patient; let me know if any occur. Monitor
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
## 2020-03-12 ENCOUNTER — COMMUNICATION - HEALTHEAST (OUTPATIENT)
Dept: PULMONOLOGY | Facility: OTHER | Age: 55
End: 2020-03-12

## 2020-03-12 ENCOUNTER — AMBULATORY - HEALTHEAST (OUTPATIENT)
Dept: PULMONOLOGY | Facility: OTHER | Age: 55
End: 2020-03-12

## 2020-03-12 DIAGNOSIS — J45.21 MILD INTERMITTENT ASTHMA WITH EXACERBATION: ICD-10-CM

## 2020-03-13 ENCOUNTER — CARE COORDINATION (OUTPATIENT)
Dept: FAMILY MEDICINE | Facility: CLINIC | Age: 55
End: 2020-03-13

## 2020-03-13 NOTE — PROGRESS NOTES
Care Coordination Assessment    PCP: Yani Martin    Referral Source:  ED/IP List        Clinical Data: Patient discharged from inpatient at [facility] 03/09/2020 with Acute chronic respiratory failure with hypoxia.  Patient discharged home with instruction to follow up with PCP in 5-7 days.    Plan: Patient has already followed up.  I will close encounter

## 2020-03-14 LAB
BACTERIA SPEC CULT: NO GROWTH
BACTERIA SPEC CULT: NO GROWTH
SPECIMEN SOURCE: NORMAL
SPECIMEN SOURCE: NORMAL

## 2020-03-16 ENCOUNTER — COMMUNICATION - HEALTHEAST (OUTPATIENT)
Dept: PULMONOLOGY | Facility: OTHER | Age: 55
End: 2020-03-16

## 2020-03-25 ENCOUNTER — MYC MEDICAL ADVICE (OUTPATIENT)
Dept: FAMILY MEDICINE | Facility: CLINIC | Age: 55
End: 2020-03-25

## 2020-03-25 DIAGNOSIS — L03.115 CELLULITIS OF RIGHT LOWER EXTREMITY: ICD-10-CM

## 2020-03-25 RX ORDER — CEPHALEXIN 500 MG/1
500 CAPSULE ORAL 2 TIMES DAILY
Qty: 20 CAPSULE | Refills: 0 | Status: SHIPPED | OUTPATIENT
Start: 2020-03-25 | End: 2020-08-14

## 2020-04-15 ENCOUNTER — ALLIED HEALTH/NURSE VISIT (OUTPATIENT)
Dept: EDUCATION SERVICES | Facility: CLINIC | Age: 55
End: 2020-04-15
Payer: COMMERCIAL

## 2020-04-15 DIAGNOSIS — E11.9 TYPE 2 DIABETES MELLITUS WITHOUT COMPLICATION, WITHOUT LONG-TERM CURRENT USE OF INSULIN (H): ICD-10-CM

## 2020-04-15 PROCEDURE — 98967 PH1 ASSMT&MGMT NQHP 11-20: CPT | Performed by: DIETITIAN, REGISTERED

## 2020-04-15 RX ORDER — METFORMIN HCL 500 MG
1500 TABLET, EXTENDED RELEASE 24 HR ORAL DAILY
Qty: 270 TABLET | Refills: 0 | Status: SHIPPED | OUTPATIENT
Start: 2020-04-15 | End: 2020-07-31

## 2020-04-15 NOTE — PROGRESS NOTES
"Diabetes Self-Management Education & Support    Presents for: Follow-up    Patient verbally consented to the telephone visit service today: yes    SUBJECTIVE/OBJECTIVE:  Presents for: Follow-up  Accompanied by: Self  Diabetes education in the past 24mo: Yes  Focus of Visit: Problem Solving  Diabetes type: Type 2  Disease course: Improving  Other concerns:: None  Cultural Influences/Ethnic Background:  American    Patient Problem List and Family Medical History reviewed for relevant medical history, current medical status, and diabetes risk factors.    Vitals:  There were no vitals taken for this visit.  Estimated body mass index is 73.72 kg/m  as calculated from the following:    Height as of 3/8/20: 1.499 m (4' 11\").    Weight as of 3/8/20: 165.6 kg (365 lb).   Last 3 BP:   BP Readings from Last 3 Encounters:   03/11/20 108/62   03/09/20 112/61   01/28/20 (!) 140/78       History   Smoking Status     Never Smoker   Smokeless Tobacco     Never Used       Labs:  Lab Results   Component Value Date    A1C 9.1 01/22/2020     Lab Results   Component Value Date     03/09/2020     Lab Results   Component Value Date    LDL 76 05/07/2016     HDL Cholesterol   Date Value Ref Range Status   05/07/2016 45 (L) > OR = 46 mg/dL Final   ]  GFR Estimate   Date Value Ref Range Status   03/09/2020 57 (L) >60 mL/min/[1.73_m2] Final     Comment:     Non  GFR Calc  Starting 12/18/2018, serum creatinine based estimated GFR (eGFR) will be   calculated using the Chronic Kidney Disease Epidemiology Collaboration   (CKD-EPI) equation.       GFR Estimate If Black   Date Value Ref Range Status   03/09/2020 66 >60 mL/min/[1.73_m2] Final     Comment:      GFR Calc  Starting 12/18/2018, serum creatinine based estimated GFR (eGFR) will be   calculated using the Chronic Kidney Disease Epidemiology Collaboration   (CKD-EPI) equation.       Lab Results   Component Value Date    CR 1.08 03/09/2020     No results " found for: MICROALBUMIN    Healthy Eating:  Healthy Eating Assessed Today: Yes  Cultural/Worship diet restrictions?: No  Meal planning/habits: Carb counting, Smaller portions  Meals include: Breakfast, Lunch, Dinner  Beverages: Water  Has patient met with a dietitian in the past?: Yes    Being Active:  Being Active Assessed Today: No  Exercise:: Currently not exercising  Barrier to exercise: Physical limitation    Monitoring:  Monitoring Assessed Today: Yes  Times checking blood sugar at home (number): 2  Blood glucose trend: Fluctuating                  Taking Medications:  Diabetes Medication(s)     Biguanides       metFORMIN (GLUCOPHAGE-XR) 500 MG 24 hr tablet    Take 2 tablets (1,000 mg) by mouth daily        Taking Medication Assessed Today: Yes  Current Treatments: Oral Medication (taken by mouth)  Problems taking diabetes medications regularly?: No  Diabetes medication side effects?: Yes    Problem Solving:  Problem Solving Assessed Today: Yes  Is the patient at risk for hypoglycemia?: No    Reducing Risks:  Reducing Risks Assessed Today: No    Healthy Coping:  Healthy Coping Assessed Today: Yes  Emotional response to diabetes: Concern for health and well-being  Stage of change: ACTION (Actively working towards change)  Patient Activation Measure Survey Score:  MAURICE Score (Last Two) 9/9/2015   MAURICE Raw Score 46   Activation Score 75.3   MAURICE Level 4       Diabetes knowledge and skills assessment:   Patient is knowledgeable in diabetes management concepts related to: Healthy Eating, Being Active, Monitoring, Taking Medication, Problem Solving, Reducing Risks and Healthy Coping    Patient needs further education on the following diabetes management concepts: Healthy Eating, Monitoring, Taking Medication and Problem Solving    Based on learning assessment above, most appropriate setting for further diabetes education would be: Group class or Individual setting.      INTERVENTION:  Education provided today  on:  AADE Self-Care Behaviors:  Healthy Eating: consistency in amount, composition, and timing of food intake  Monitoring: log and interpret results and individual blood glucose targets  Taking Medication: action of prescribed medication and side effects of prescribed medications  Problem Solving: high blood glucose - causes, signs/symptoms, treatment and prevention and when to call health care provider    Opportunities for ongoing education and support in diabetes-self management were discussed.    Pt verbalized understanding of concepts discussed and recommendations provided today.       Education Materials Provided:  No new materials provided today      ASSESSMENT:  Over-all BG improvement since diagnosis. Current fasting BG values are slightly above target, post evening meals BG's are variable. Pt reports waking during the night feeling hungry because she typically does not eat much for dinner. Discussed consistency with mealcompolisition for improved BG stability.   Pt may benefit from increase in Metformin from 1000 mg every day to 1500 mg every day. Pt agreeable.     Patient's most recent   Lab Results   Component Value Date    A1C 9.1 01/22/2020    is not meeting goal of <7.0    PLAN  See Patient Instructions for co-developed, patient-stated behavior change goals.  AVS printed and provided to patient today. CDE follow up 3 weeks.     Laureen Michael RD, CDE  Diabetes     Time Spent: 20 minutes  Encounter Type: Individual    Any diabetes medication dose changes were made via the CDE Protocol and Collaborative Practice Agreement with the patient's primary care provider. A copy of this encounter was shared with the provider.

## 2020-04-15 NOTE — LETTER
"    4/15/2020         RE: Mirta Decker  [address] [facility] [address]        Dear Colleague,    Thank you for referring your patient, Mirta Decker, to the [facility]. Please see a copy of my visit note below.    Diabetes Self-Management Education & Support    Presents for: Follow-up    Patient verbally consented to the telephone visit service today: yes    SUBJECTIVE/OBJECTIVE:  Presents for: Follow-up  Accompanied by: Self  Diabetes education in the past 24mo: Yes  Focus of Visit: Problem Solving  Diabetes type: Type 2  Disease course: Improving  Other concerns:: None  Cultural Influences/Ethnic Background:  American    Patient Problem List and Family Medical History reviewed for relevant medical history, current medical status, and diabetes risk factors.    Vitals:  There were no vitals taken for this visit.  Estimated body mass index is 73.72 kg/m  as calculated from the following:    Height as of 3/8/20: 1.499 m (4' 11\").    Weight as of 3/8/20: 165.6 kg (365 lb).   Last 3 BP:   BP Readings from Last 3 Encounters:   03/11/20 108/62   03/09/20 112/61   01/28/20 (!) 140/78       History   Smoking Status     Never Smoker   Smokeless Tobacco     Never Used       Labs:  Lab Results   Component Value Date    A1C 9.1 01/22/2020     Lab Results   Component Value Date     03/09/2020     Lab Results   Component Value Date    LDL 76 05/07/2016     HDL Cholesterol   Date Value Ref Range Status   05/07/2016 45 (L) > OR = 46 mg/dL Final   ]  GFR Estimate   Date Value Ref Range Status   03/09/2020 57 (L) >60 mL/min/[1.73_m2] Final     Comment:     Non  GFR Calc  Starting 12/18/2018, serum creatinine based estimated GFR (eGFR) will be   calculated using the Chronic Kidney Disease Epidemiology Collaboration   (CKD-EPI) equation.       GFR Estimate If Black   Date Value Ref Range Status   03/09/2020 66 >60 mL/min/[1.73_m2] Final     Comment:      " American GFR Calc  Starting 12/18/2018, serum creatinine based estimated GFR (eGFR) will be   calculated using the Chronic Kidney Disease Epidemiology Collaboration   (CKD-EPI) equation.       Lab Results   Component Value Date    CR 1.08 03/09/2020     No results found for: MICROALBUMIN    Healthy Eating:  Healthy Eating Assessed Today: Yes  Cultural/Caodaism diet restrictions?: No  Meal planning/habits: Carb counting, Smaller portions  Meals include: Breakfast, Lunch, Dinner  Beverages: Water  Has patient met with a dietitian in the past?: Yes    Being Active:  Being Active Assessed Today: No  Exercise:: Currently not exercising  Barrier to exercise: Physical limitation    Monitoring:  Monitoring Assessed Today: Yes  Times checking blood sugar at home (number): 2  Blood glucose trend: Fluctuating                  Taking Medications:  Diabetes Medication(s)     Biguanides       metFORMIN (GLUCOPHAGE-XR) 500 MG 24 hr tablet    Take 2 tablets (1,000 mg) by mouth daily        Taking Medication Assessed Today: Yes  Current Treatments: Oral Medication (taken by mouth)  Problems taking diabetes medications regularly?: No  Diabetes medication side effects?: Yes    Problem Solving:  Problem Solving Assessed Today: Yes  Is the patient at risk for hypoglycemia?: No    Reducing Risks:  Reducing Risks Assessed Today: No    Healthy Coping:  Healthy Coping Assessed Today: Yes  Emotional response to diabetes: Concern for health and well-being  Stage of change: ACTION (Actively working towards change)  Patient Activation Measure Survey Score:  MAURICE Score (Last Two) 9/9/2015   MAURICE Raw Score 46   Activation Score 75.3   MAURICE Level 4       Diabetes knowledge and skills assessment:   Patient is knowledgeable in diabetes management concepts related to: Healthy Eating, Being Active, Monitoring, Taking Medication, Problem Solving, Reducing Risks and Healthy Coping    Patient needs further education on the following diabetes management  concepts: Healthy Eating, Monitoring, Taking Medication and Problem Solving    Based on learning assessment above, most appropriate setting for further diabetes education would be: Group class or Individual setting.      INTERVENTION:  Education provided today on:  AADE Self-Care Behaviors:  Healthy Eating: consistency in amount, composition, and timing of food intake  Monitoring: log and interpret results and individual blood glucose targets  Taking Medication: action of prescribed medication and side effects of prescribed medications  Problem Solving: high blood glucose - causes, signs/symptoms, treatment and prevention and when to call health care provider    Opportunities for ongoing education and support in diabetes-self management were discussed.    Pt verbalized understanding of concepts discussed and recommendations provided today.       Education Materials Provided:  No new materials provided today      ASSESSMENT:  Over-all BG improvement since diagnosis. Current fasting BG values are slightly above target, post evening meals BG's are variable. Pt reports waking during the night feeling hungry because she typically does not eat much for dinner. Discussed consistency with mealcompolisition for improved BG stability.   Pt may benefit from increase in Metformin from 1000 mg every day to 1500 mg every day. Pt agreeable.     Patient's most recent   Lab Results   Component Value Date    A1C 9.1 01/22/2020    is not meeting goal of <7.0    PLAN  See Patient Instructions for co-developed, patient-stated behavior change goals.  AVS printed and provided to patient today. CDE follow up 3 weeks.     Laureen Michael RD, CDE  Diabetes     Time Spent: 20 minutes  Encounter Type: Individual    Any diabetes medication dose changes were made via the CDE Protocol and Collaborative Practice Agreement with the patient's primary care provider. A copy of this encounter was shared with the provider.

## 2020-04-16 ENCOUNTER — TRANSFERRED RECORDS (OUTPATIENT)
Dept: FAMILY MEDICINE | Facility: CLINIC | Age: 55
End: 2020-04-16

## 2020-04-16 ENCOUNTER — OFFICE VISIT - HEALTHEAST (OUTPATIENT)
Dept: SLEEP MEDICINE | Facility: CLINIC | Age: 55
End: 2020-04-16

## 2020-04-16 DIAGNOSIS — G47.33 OSA (OBSTRUCTIVE SLEEP APNEA): ICD-10-CM

## 2020-04-16 DIAGNOSIS — E66.01 MORBID OBESITY (H): ICD-10-CM

## 2020-04-16 DIAGNOSIS — R09.02 HYPOXEMIA: ICD-10-CM

## 2020-04-16 NOTE — PATIENT INSTRUCTIONS
Increase Metformin from 2 tabs daily to 3 tabs daily. Can split between morning meal and evening meal if preferred.     Continue to work on consistency with meals and carb intake.     Great job on the blood sugar improvement!

## 2020-04-29 ENCOUNTER — RECORDS - HEALTHEAST (OUTPATIENT)
Dept: ADMINISTRATIVE | Facility: OTHER | Age: 55
End: 2020-04-29

## 2020-05-01 ENCOUNTER — RECORDS - HEALTHEAST (OUTPATIENT)
Dept: ADMINISTRATIVE | Facility: OTHER | Age: 55
End: 2020-05-01

## 2020-05-06 ENCOUNTER — ALLIED HEALTH/NURSE VISIT (OUTPATIENT)
Dept: EDUCATION SERVICES | Facility: CLINIC | Age: 55
End: 2020-05-06
Payer: COMMERCIAL

## 2020-05-06 DIAGNOSIS — E11.9 TYPE 2 DIABETES MELLITUS WITHOUT COMPLICATION, WITHOUT LONG-TERM CURRENT USE OF INSULIN (H): Primary | ICD-10-CM

## 2020-05-06 PROCEDURE — 98968 PH1 ASSMT&MGMT NQHP 21-30: CPT | Performed by: DIETITIAN, REGISTERED

## 2020-05-06 NOTE — LETTER
"    5/6/2020         RE: Mirta Decker  [address] [facility] [address]        Dear Colleague,    Thank you for referring your patient, Mirta Decker, to the [facility]. Please see a copy of my visit note below.    Diabetes Self-Management Education & Support    Presents for: Follow-up    Patient verbally consented to the telephone visit service today: yes    SUBJECTIVE/OBJECTIVE:  Presents for: Follow-up  Accompanied by: Self  Diabetes education in the past 24mo: Yes  Focus of Visit: Monitoring, Healthy Eating, Taking Medication  Diabetes type: Type 2  Date of diagnosis: Jan. 2020  Disease course: Improving  How confident are you filling out medical forms by yourself:: Extremely  Transportation concerns: No  Other concerns:: Physical impairment  Cultural Influences/Ethnic Background:  American    Diabetes Symptoms & Complications:  Fatigue: Yes  Neuropathy: No  Polydipsia: No  Polyphagia: No  Polyuria: No  Visual change: No  Slow healing wounds: Yes  Symptom course: Improving  Weight trend: Decreasing  Complications assessed today?: No  Autonomic neuropathy: No  CVA: No  Heart disease: No  Nephropathy: No  Peripheral neuropathy: No  Foot ulcerations: No  Retinopathy: No  Sexual dysfunction: No    Patient Problem List and Family Medical History reviewed for relevant medical history, current medical status, and diabetes risk factors.    Vitals:  There were no vitals taken for this visit.  Estimated body mass index is 73.72 kg/m  as calculated from the following:    Height as of 3/8/20: 1.499 m (4' 11\").    Weight as of 3/8/20: 165.6 kg (365 lb).   Last 3 BP:   BP Readings from Last 3 Encounters:   03/11/20 108/62   03/09/20 112/61   01/28/20 (!) 140/78       History   Smoking Status     Never Smoker   Smokeless Tobacco     Never Used       Labs:  Lab Results   Component Value Date    A1C 9.1 01/22/2020     Lab Results   Component Value Date     03/09/2020     Lab " Results   Component Value Date    LDL 76 05/07/2016     HDL Cholesterol   Date Value Ref Range Status   05/07/2016 45 (L) > OR = 46 mg/dL Final   ]  GFR Estimate   Date Value Ref Range Status   03/09/2020 57 (L) >60 mL/min/[1.73_m2] Final     Comment:     Non  GFR Calc  Starting 12/18/2018, serum creatinine based estimated GFR (eGFR) will be   calculated using the Chronic Kidney Disease Epidemiology Collaboration   (CKD-EPI) equation.       GFR Estimate If Black   Date Value Ref Range Status   03/09/2020 66 >60 mL/min/[1.73_m2] Final     Comment:      GFR Calc  Starting 12/18/2018, serum creatinine based estimated GFR (eGFR) will be   calculated using the Chronic Kidney Disease Epidemiology Collaboration   (CKD-EPI) equation.       Lab Results   Component Value Date    CR 1.08 03/09/2020     No results found for: MICROALBUMIN    Healthy Eating:  Healthy Eating Assessed Today: Yes  Cultural/Hinduism diet restrictions?: No  Meal planning/habits: Calorie counting, Heart healthy, Low salt, Plate planning method, Carb counting, Smaller portions  Meals include: Breakfast, Lunch, Dinner  Beverages: Water, Tea, Coffee  Has patient met with a dietitian in the past?: Yes    Being Active:  Being Active Assessed Today: No  Exercise:: Unable to exercise  How intense was your typical exercise? : Light (like stretching or slow walking)  Barrier to exercise: Physical limitation    Monitoring:  Monitoring Assessed Today: Yes  Did patient bring glucose meter to appointment? : (BG meter instruction provided today)  Times checking blood sugar at home (number): 2  Times checking blood sugar at home (per): Day  Blood glucose trend: Decreasing        Taking Medications:  Diabetes Medication(s)     Biguanides       metFORMIN (GLUCOPHAGE-XR) 500 MG 24 hr tablet    Take 3 tablets (1,500 mg) by mouth daily          Taking Medication Assessed Today: Yes  Current Treatments: Oral Medication (taken by  mouth)  Problems taking diabetes medications regularly?: No  Diabetes medication side effects?: Yes, some nausea after increase in dose, doing better now    Problem Solving:  Problem Solving Assessed Today: No  Is the patient at risk for hypoglycemia?: No  Hypoglycemia Frequency: Other  Patient carries a carbohydrate source: No  Medical ID: No  Does patient have DKA prevention plan?: No  Does patient have severe weather/disaster plan for diabetes management?: No  Does patient have sick day plan for diabetes management?: No    Hypoglycemia symptoms  Confusion: No  Dizziness or Light-Headedness: Yes  Headaches: Yes  Hunger: Yes  Mood changes: Yes  Nervousness/Anxiety: Yes  Sleepiness: Yes  Speech difficulty: Yes  Sweats: No  Feeling shaky: Yes    Hypoglycemia Complications  Blackouts: No  Hospitalization: No  Nocturnal hypoglycemia: No  Required assistance: No  Required glucagon injection: No  Seizures: No    Reducing Risks:  Reducing Risks Assessed Today: Yes  Diabetes Risks: Age over 45 years, Family History, Sedentary Lifestyle  CAD Risks: Family history, Hypertension, Obesity, Post-menopausal, Stress  Has dilated eye exam at least once a year?: No  Sees dentist every 6 months?: Yes  Feet checked by healthcare provider in the last year?: No    Healthy Coping:  Healthy Coping Assessed Today: Yes  Emotional response to diabetes: Ready to learn, Acceptance  Informal Support system:: Children, [place]wal based, Family, Friends, Parent, Spouse  Stage of change: ACTION (Actively working towards change)  Support resources: Websites  Patient Activation Measure Survey Score:  MAURICE Score (Last Two) 9/9/2015   MAURICE Raw Score 46   Activation Score 75.3   MAURICE Level 4       Diabetes knowledge and skills assessment:   Patient is knowledgeable in diabetes management concepts related to: Healthy Eating, Being Active, Monitoring, Taking Medication and Problem Solving    Patient needs further education on the following diabetes management  concepts: Healthy Eating, Reducing Risks and Healthy Coping    Based on learning assessment above, most appropriate setting for further diabetes education would be: Individual setting.      INTERVENTION:  Education provided today on:  AADE Self-Care Behaviors:  Healthy Eating: benefits of continued weight loss  Reducing Risks: prevention, early diagnostic measures and treatment of complications, annual eye exam, A1C - goals, relating to blood glucose levels, how often to check, lipids levels and goals and blood pressure and goals  Healthy Coping: benefits of making appropriate lifestyle changes and utilize support systems    Opportunities for ongoing education and support in diabetes-self management were discussed.    Pt verbalized understanding of concepts discussed and recommendations provided today.       Education Materials Provided:  No new materials provided today      ASSESSMENT:  Blood sugars have continued to improve since Metformin increased, has also lost a total of 28#. Pt is comfortable with current plan, commended on progress and encouraged to continue weight loss efforts. No further medication changes recommended at this time.     Patient's most recent   Lab Results   Component Value Date    A1C 9.1 01/22/2020    is not meeting goal of <7.0    PLAN  See Patient Instructions for co-developed, patient-stated behavior change goals.  AVS printed and provided to patient today. See Follow-Up section for recommended follow-up.    Laureen Michael RD, CDE  Diabetes     Time Spent: 30 minutes  Encounter Type: Individual    Any diabetes medication dose changes were made via the CDE Protocol and Collaborative Practice Agreement with the patient's primary care provider. A copy of this encounter was shared with the provider.

## 2020-05-06 NOTE — PROGRESS NOTES
"Diabetes Self-Management Education & Support    Presents for: Follow-up    Patient verbally consented to the telephone visit service today: yes    SUBJECTIVE/OBJECTIVE:  Presents for: Follow-up  Accompanied by: Self  Diabetes education in the past 24mo: Yes  Focus of Visit: Monitoring, Healthy Eating, Taking Medication  Diabetes type: Type 2  Date of diagnosis: Jan. 2020  Disease course: Improving  How confident are you filling out medical forms by yourself:: Extremely  Transportation concerns: No  Other concerns:: Physical impairment  Cultural Influences/Ethnic Background:  American    Diabetes Symptoms & Complications:  Fatigue: Yes  Neuropathy: No  Polydipsia: No  Polyphagia: No  Polyuria: No  Visual change: No  Slow healing wounds: Yes  Symptom course: Improving  Weight trend: Decreasing  Complications assessed today?: No  Autonomic neuropathy: No  CVA: No  Heart disease: No  Nephropathy: No  Peripheral neuropathy: No  Foot ulcerations: No  Retinopathy: No  Sexual dysfunction: No    Patient Problem List and Family Medical History reviewed for relevant medical history, current medical status, and diabetes risk factors.    Vitals:  There were no vitals taken for this visit.  Estimated body mass index is 73.72 kg/m  as calculated from the following:    Height as of 3/8/20: 1.499 m (4' 11\").    Weight as of 3/8/20: 165.6 kg (365 lb).   Last 3 BP:   BP Readings from Last 3 Encounters:   03/11/20 108/62   03/09/20 112/61   01/28/20 (!) 140/78       History   Smoking Status     Never Smoker   Smokeless Tobacco     Never Used       Labs:  Lab Results   Component Value Date    A1C 9.1 01/22/2020     Lab Results   Component Value Date     03/09/2020     Lab Results   Component Value Date    LDL 76 05/07/2016     HDL Cholesterol   Date Value Ref Range Status   05/07/2016 45 (L) > OR = 46 mg/dL Final   ]  GFR Estimate   Date Value Ref Range Status   03/09/2020 57 (L) >60 mL/min/[1.73_m2] Final     Comment:     Non "  GFR Calc  Starting 12/18/2018, serum creatinine based estimated GFR (eGFR) will be   calculated using the Chronic Kidney Disease Epidemiology Collaboration   (CKD-EPI) equation.       GFR Estimate If Black   Date Value Ref Range Status   03/09/2020 66 >60 mL/min/[1.73_m2] Final     Comment:      GFR Calc  Starting 12/18/2018, serum creatinine based estimated GFR (eGFR) will be   calculated using the Chronic Kidney Disease Epidemiology Collaboration   (CKD-EPI) equation.       Lab Results   Component Value Date    CR 1.08 03/09/2020     No results found for: MICROALBUMIN    Healthy Eating:  Healthy Eating Assessed Today: Yes  Cultural/Denominational diet restrictions?: No  Meal planning/habits: Calorie counting, Heart healthy, Low salt, Plate planning method, Carb counting, Smaller portions  Meals include: Breakfast, Lunch, Dinner  Beverages: Water, Tea, Coffee  Has patient met with a dietitian in the past?: Yes    Being Active:  Being Active Assessed Today: No  Exercise:: Unable to exercise  How intense was your typical exercise? : Light (like stretching or slow walking)  Barrier to exercise: Physical limitation    Monitoring:  Monitoring Assessed Today: Yes  Did patient bring glucose meter to appointment? : (BG meter instruction provided today)  Times checking blood sugar at home (number): 2  Times checking blood sugar at home (per): Day  Blood glucose trend: Decreasing        Taking Medications:  Diabetes Medication(s)     Biguanides       metFORMIN (GLUCOPHAGE-XR) 500 MG 24 hr tablet    Take 3 tablets (1,500 mg) by mouth daily          Taking Medication Assessed Today: Yes  Current Treatments: Oral Medication (taken by mouth)  Problems taking diabetes medications regularly?: No  Diabetes medication side effects?: Yes, some nausea after increase in dose, doing better now    Problem Solving:  Problem Solving Assessed Today: No  Is the patient at risk for hypoglycemia?: No  Hypoglycemia  Frequency: Other  Patient carries a carbohydrate source: No  Medical ID: No  Does patient have DKA prevention plan?: No  Does patient have severe weather/disaster plan for diabetes management?: No  Does patient have sick day plan for diabetes management?: No    Hypoglycemia symptoms  Confusion: No  Dizziness or Light-Headedness: Yes  Headaches: Yes  Hunger: Yes  Mood changes: Yes  Nervousness/Anxiety: Yes  Sleepiness: Yes  Speech difficulty: Yes  Sweats: No  Feeling shaky: Yes    Hypoglycemia Complications  Blackouts: No  Hospitalization: No  Nocturnal hypoglycemia: No  Required assistance: No  Required glucagon injection: No  Seizures: No    Reducing Risks:  Reducing Risks Assessed Today: Yes  Diabetes Risks: Age over 45 years, Family History, Sedentary Lifestyle  CAD Risks: Family history, Hypertension, Obesity, Post-menopausal, Stress  Has dilated eye exam at least once a year?: No  Sees dentist every 6 months?: Yes  Feet checked by healthcare provider in the last year?: No    Healthy Coping:  Healthy Coping Assessed Today: Yes  Emotional response to diabetes: Ready to learn, Acceptance  Informal Support system:: Children, Kanwal based, Family, Friends, Parent, Spouse  Stage of change: ACTION (Actively working towards change)  Support resources: Websites  Patient Activation Measure Survey Score:  MAURICE Score (Last Two) 9/9/2015   MAURICE Raw Score 46   Activation Score 75.3   MAURICE Level 4       Diabetes knowledge and skills assessment:   Patient is knowledgeable in diabetes management concepts related to: Healthy Eating, Being Active, Monitoring, Taking Medication and Problem Solving    Patient needs further education on the following diabetes management concepts: Healthy Eating, Reducing Risks and Healthy Coping    Based on learning assessment above, most appropriate setting for further diabetes education would be: Individual setting.      INTERVENTION:  Education provided today on:  AADE Self-Care Behaviors:  Healthy  Eating: benefits of continued weight loss  Reducing Risks: prevention, early diagnostic measures and treatment of complications, annual eye exam, A1C - goals, relating to blood glucose levels, how often to check, lipids levels and goals and blood pressure and goals  Healthy Coping: benefits of making appropriate lifestyle changes and utilize support systems    Opportunities for ongoing education and support in diabetes-self management were discussed.    Pt verbalized understanding of concepts discussed and recommendations provided today.       Education Materials Provided:  No new materials provided today      ASSESSMENT:  Blood sugars have continued to improve since Metformin increased, has also lost a total of 28#. Pt is comfortable with current plan, commended on progress and encouraged to continue weight loss efforts. No further medication changes recommended at this time.     Patient's most recent   Lab Results   Component Value Date    A1C 9.1 01/22/2020    is not meeting goal of <7.0    PLAN  See Patient Instructions for co-developed, patient-stated behavior change goals.  AVS printed and provided to patient today. See Follow-Up section for recommended follow-up.    Laureen Michael RD, CDE  Diabetes     Time Spent: 30 minutes  Encounter Type: Individual    Any diabetes medication dose changes were made via the CDE Protocol and Collaborative Practice Agreement with the patient's primary care provider. A copy of this encounter was shared with the provider.

## 2020-05-06 NOTE — PATIENT INSTRUCTIONS
Continue current dose of Metformin.    Continue the great job with healthy eating and weight loss!     Follow up as needed.

## 2020-05-11 ENCOUNTER — HOSPITAL ENCOUNTER (OUTPATIENT)
Dept: OCCUPATIONAL THERAPY | Facility: CLINIC | Age: 55
Setting detail: THERAPIES SERIES
End: 2020-05-11
Attending: FAMILY MEDICINE
Payer: COMMERCIAL

## 2020-05-11 PROCEDURE — 97535 SELF CARE MNGMENT TRAINING: CPT | Mod: GO,GT | Performed by: OCCUPATIONAL THERAPIST

## 2020-05-11 NOTE — PROGRESS NOTES
Mirta Decker is a 55 year old female who is being seen via a billable video visit.      Patient has given verbal consent for Video visit? Yes  Declines email of pateint bill of rights.    Video Start Time: 1:08  - 1:52  Additional Participants in Telehealth Visit: none    Distant Location (provider location):  [facility]     Mode of Communication (Audio Visual or Audio Only): Audio Visual     Wanda Souza, OTR  May 11, 2020

## 2020-06-02 ENCOUNTER — RECORDS - HEALTHEAST (OUTPATIENT)
Dept: ADMINISTRATIVE | Facility: OTHER | Age: 55
End: 2020-06-02

## 2020-06-07 ENCOUNTER — RECORDS - HEALTHEAST (OUTPATIENT)
Dept: ADMINISTRATIVE | Facility: OTHER | Age: 55
End: 2020-06-07

## 2020-06-08 ENCOUNTER — OFFICE VISIT - HEALTHEAST (OUTPATIENT)
Dept: PULMONOLOGY | Facility: OTHER | Age: 55
End: 2020-06-08

## 2020-06-08 DIAGNOSIS — R11.0 NAUSEA: ICD-10-CM

## 2020-06-08 DIAGNOSIS — E66.01 MORBID OBESITY WITH BMI OF 60.0-69.9, ADULT (H): ICD-10-CM

## 2020-06-08 DIAGNOSIS — I50.32 CHRONIC HEART FAILURE WITH PRESERVED EJECTION FRACTION (H): ICD-10-CM

## 2020-06-08 DIAGNOSIS — G47.33 OSA (OBSTRUCTIVE SLEEP APNEA): ICD-10-CM

## 2020-06-08 DIAGNOSIS — R50.9 FEVER, UNSPECIFIED FEVER CAUSE: ICD-10-CM

## 2020-06-08 DIAGNOSIS — J96.12 CHRONIC HYPERCAPNIC RESPIRATORY FAILURE (H): ICD-10-CM

## 2020-06-08 ASSESSMENT — MIFFLIN-ST. JEOR: SCORE: 2059.15

## 2020-06-09 ENCOUNTER — AMBULATORY - HEALTHEAST (OUTPATIENT)
Dept: FAMILY MEDICINE | Facility: CLINIC | Age: 55
End: 2020-06-09

## 2020-06-09 DIAGNOSIS — R50.9 FEVER, UNSPECIFIED FEVER CAUSE: ICD-10-CM

## 2020-06-10 ENCOUNTER — TRANSFERRED RECORDS (OUTPATIENT)
Dept: FAMILY MEDICINE | Facility: CLINIC | Age: 55
End: 2020-06-10

## 2020-06-10 ENCOUNTER — COMMUNICATION - HEALTHEAST (OUTPATIENT)
Dept: FAMILY MEDICINE | Facility: CLINIC | Age: 55
End: 2020-06-10

## 2020-06-26 ENCOUNTER — RECORDS - HEALTHEAST (OUTPATIENT)
Dept: ADMINISTRATIVE | Facility: OTHER | Age: 55
End: 2020-06-26

## 2020-07-20 NOTE — NURSING NOTE
July 22, 2020       Baron Nicole  [address] [facility] [address]      Dear Baron      This letter is to confirm your procedure with Dr. Bowman on 8/6/20 , Your arrival time is 12:30 p.m.. Please review the enclosed prep instructions for your procedure.    If you have any questions regarding these instructions or need to reschedule please call the office at  [phone].       Sincerely,      Kiersten       Your procedure will be done at the hospital below.     [x]  [facility], [address] [facility], [facility]  [address]       4th Floor GI Lab. Take elevator left of [employer] [address] (south elevator).       Turn right off of elevator on 4th floor. Door in front of you        GI Lab.     Patient is here for a fever and chill - that started yesterday.   Pre-Visit Screening not done today.  Pulse - regular  My Chart - accepts    CLASSIFICATION OF OVERWEIGHT AND OBESITY BY BMI                         Obesity Class           BMI(kg/m2)  Underweight                                    < 18.5  Normal                                         18.5-24.9  Overweight                                     25.0-29.9  OBESITY                     I                  30.0-34.9                              II                 35.0-39.9  EXTREME OBESITY             III                >40                             Patient's  BMI There is no height or weight on file to calculate BMI.  http://hin.nhlbi.nih.gov/menuplanner/menu.cgi  Questioned patient about current smoking habits.  Pt. has never smoked.    No able to do Wt

## 2020-07-26 DIAGNOSIS — E11.9 TYPE 2 DIABETES MELLITUS WITHOUT COMPLICATION, WITHOUT LONG-TERM CURRENT USE OF INSULIN (H): ICD-10-CM

## 2020-07-28 RX ORDER — METFORMIN HCL 500 MG
TABLET, EXTENDED RELEASE 24 HR ORAL
Qty: 270 TABLET | Refills: 0 | COMMUNITY
Start: 2020-07-28

## 2020-07-28 NOTE — TELEPHONE ENCOUNTER
Mirta Decker is requesting a refill of:    Refused Prescriptions:                       Disp   Refills    metFORMIN (GLUCOPHAGE-XR) 500 MG 24 hr tab*270 ta*0        Sig: TAKE 3 TABLETS BY MOUTH DAILY   Refused By: ADONAY HEDRICK  Reason for Refusal: Patient needs appointment    Last A1C 01/22/20, due for FASTING OV

## 2020-07-30 ENCOUNTER — MYC REFILL (OUTPATIENT)
Dept: FAMILY MEDICINE | Facility: CLINIC | Age: 55
End: 2020-07-30

## 2020-07-30 DIAGNOSIS — E11.9 TYPE 2 DIABETES MELLITUS WITHOUT COMPLICATION, WITHOUT LONG-TERM CURRENT USE OF INSULIN (H): ICD-10-CM

## 2020-07-30 RX ORDER — METFORMIN HCL 500 MG
1500 TABLET, EXTENDED RELEASE 24 HR ORAL DAILY
Qty: 270 TABLET | Refills: 0 | Status: CANCELLED | OUTPATIENT
Start: 2020-07-30

## 2020-07-31 NOTE — ADDENDUM NOTE
Encounter addended by: Windy Buckley, PT on: 7/31/2020 4:19 PM   Actions taken: Clinical Note Signed, Episode resolved
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
## 2020-08-13 ENCOUNTER — MYC MEDICAL ADVICE (OUTPATIENT)
Dept: FAMILY MEDICINE | Facility: CLINIC | Age: 55
End: 2020-08-13

## 2020-08-13 NOTE — TELEPHONE ENCOUNTER
Routing to Dr. Martin for review. Are you willing to do a virtual appointment for the patient or do you want her to come in?

## 2020-08-14 ENCOUNTER — OFFICE VISIT (OUTPATIENT)
Dept: FAMILY MEDICINE | Facility: CLINIC | Age: 55
End: 2020-08-14

## 2020-08-14 VITALS
TEMPERATURE: 98.7 F | DIASTOLIC BLOOD PRESSURE: 72 MMHG | HEART RATE: 104 BPM | RESPIRATION RATE: 22 BRPM | BODY MASS INDEX: 59.07 KG/M2 | SYSTOLIC BLOOD PRESSURE: 118 MMHG | OXYGEN SATURATION: 96 % | WEIGHT: 293 LBS | HEIGHT: 59 IN

## 2020-08-14 DIAGNOSIS — K21.9 GASTROESOPHAGEAL REFLUX DISEASE WITHOUT ESOPHAGITIS: ICD-10-CM

## 2020-08-14 DIAGNOSIS — Z79.899 ENCOUNTER FOR LONG-TERM (CURRENT) USE OF MEDICATIONS: ICD-10-CM

## 2020-08-14 DIAGNOSIS — E66.01 MORBID OBESITY WITH BMI OF 60.0-69.9, ADULT (H): ICD-10-CM

## 2020-08-14 DIAGNOSIS — D50.0 IRON DEFICIENCY ANEMIA DUE TO CHRONIC BLOOD LOSS: ICD-10-CM

## 2020-08-14 DIAGNOSIS — I10 ESSENTIAL HYPERTENSION, BENIGN: ICD-10-CM

## 2020-08-14 DIAGNOSIS — E11.9 TYPE 2 DIABETES MELLITUS WITHOUT COMPLICATION, WITHOUT LONG-TERM CURRENT USE OF INSULIN (H): Primary | ICD-10-CM
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
## 2020-08-14 PROBLEM — G47.33 OSA (OBSTRUCTIVE SLEEP APNEA): Status: ACTIVE | Noted: 2020-05-04

## 2020-08-14 LAB
% GRANULOCYTES: 72.8 %
ALBUMIN SERPL-MCNC: 3.8 G/DL (ref 3.6–5.1)
ALBUMIN/GLOB SERPL: 0.8 {RATIO} (ref 1–2.5)
ALP SERPL-CCNC: 72 U/L (ref 33–130)
ALT 1742-6: 21 U/L (ref 0–32)
AST 1920-8: 30 U/L (ref 0–35)
BILIRUB SERPL-MCNC: 0.7 MG/DL (ref 0.2–1.2)
BUN SERPL-MCNC: 14 MG/DL (ref 7–25)
BUN/CREATININE RATIO: 13.7 (ref 6–22)
CALCIUM SERPL-MCNC: 9.5 MG/DL (ref 8.6–10.3)
CHLORIDE SERPLBLD-SCNC: 98.8 MMOL/L (ref 98–110)
CHOLEST SERPL-MCNC: 178 MG/DL (ref 0–199)
CHOLEST/HDLC SERPL: 3 {RATIO} (ref 0–5)
CO2 SERPL-SCNC: 36.5 MMOL/L (ref 20–32)
CREAT SERPL-MCNC: 1.02 MG/DL (ref 0.7–1.18)
GLOBULIN, CALCULATED - QUEST: 4.6 (ref 1.9–3.7)
GLUCOSE SERPL-MCNC: 131 MG/DL (ref 60–99)
HBA1C MFR BLD: 7.4 % (ref 4–7)
HCT VFR BLD AUTO: 35.1 % (ref 35–47)
HDLC SERPL-MCNC: 51 MG/DL (ref 40–150)
HEMOGLOBIN: 11.6 G/DL (ref 11.7–15.7)
LDLC SERPL CALC-MCNC: 82 MG/DL (ref 0–130)
LYMPHOCYTES NFR BLD AUTO: 21.1 %
MCH RBC QN AUTO: 28.6 PG (ref 26–33)
MCHC RBC AUTO-ENTMCNC: 33 G/DL (ref 31–36)
MCV RBC AUTO: 86.4 FL (ref 78–100)
MONOCYTES NFR BLD AUTO: 6.1 %
PLATELET COUNT - QUEST: 263 10^9/L (ref 150–375)
POTASSIUM SERPL-SCNC: 4.36 MMOL/L (ref 3.5–5.3)
PROT SERPL-MCNC: 8.4 G/DL (ref 6.1–8.1)
RBC # BLD AUTO: 4.06 10*12/L (ref 3.8–5.2)
SODIUM SERPL-SCNC: 139.6 MMOL/L (ref 135–146)
TRIGL SERPL-MCNC: 223 MG/DL (ref 0–149)
WBC # BLD AUTO: 7.7 10*9/L (ref 4–11)

## 2020-08-14 PROCEDURE — 83036 HEMOGLOBIN GLYCOSYLATED A1C: CPT | Performed by: FAMILY MEDICINE

## 2020-08-14 PROCEDURE — 80061 LIPID PANEL: CPT | Performed by: FAMILY MEDICINE

## 2020-08-14 PROCEDURE — 36415 COLL VENOUS BLD VENIPUNCTURE: CPT | Performed by: FAMILY MEDICINE

## 2020-08-14 PROCEDURE — 85025 COMPLETE CBC W/AUTO DIFF WBC: CPT | Performed by: FAMILY MEDICINE

## 2020-08-14 PROCEDURE — 80053 COMPREHEN METABOLIC PANEL: CPT | Performed by: FAMILY MEDICINE

## 2020-08-14 PROCEDURE — 99214 OFFICE O/P EST MOD 30 MIN: CPT | Performed by: FAMILY MEDICINE

## 2020-08-14 RX ORDER — METFORMIN HCL 500 MG
1000 TABLET, EXTENDED RELEASE 24 HR ORAL 2 TIMES DAILY WITH MEALS
Qty: 360 TABLET | Refills: 1 | Status: SHIPPED | OUTPATIENT
Start: 2020-08-14 | End: 2021-03-10

## 2020-08-14 RX ORDER — LOSARTAN POTASSIUM 50 MG/1
50 TABLET ORAL DAILY
Qty: 90 TABLET | Refills: 1 | Status: SHIPPED | OUTPATIENT
Start: 2020-08-14 | End: 2021-02-26

## 2020-08-14 RX ORDER — FAMOTIDINE 20 MG/1
20 TABLET, FILM COATED ORAL 2 TIMES DAILY
Qty: 180 TABLET | Refills: 1 | Status: SHIPPED | OUTPATIENT
Start: 2020-08-14 | End: 2021-03-10

## 2020-08-14 SDOH — ECONOMIC STABILITY: TRANSPORTATION INSECURITY
IN THE PAST 12 MONTHS, HAS LACK OF TRANSPORTATION KEPT YOU FROM MEETINGS, WORK, OR FROM GETTING THINGS NEEDED FOR DAILY LIVING?: NO

## 2020-08-14 SDOH — ECONOMIC STABILITY: FOOD INSECURITY: WITHIN THE PAST 12 MONTHS, YOU WORRIED THAT YOUR FOOD WOULD RUN OUT BEFORE YOU GOT MONEY TO BUY MORE.: NEVER TRUE

## 2020-08-14 SDOH — ECONOMIC STABILITY: FOOD INSECURITY: WITHIN THE PAST 12 MONTHS, THE FOOD YOU BOUGHT JUST DIDN'T LAST AND YOU DIDN'T HAVE MONEY TO GET MORE.: NEVER TRUE

## 2020-08-14 SDOH — ECONOMIC STABILITY: TRANSPORTATION INSECURITY
IN THE PAST 12 MONTHS, HAS THE LACK OF TRANSPORTATION KEPT YOU FROM MEDICAL APPOINTMENTS OR FROM GETTING MEDICATIONS?: NO

## 2020-08-14 SDOH — ECONOMIC STABILITY: INCOME INSECURITY: HOW HARD IS IT FOR YOU TO PAY FOR THE VERY BASICS LIKE FOOD, HOUSING, MEDICAL CARE, AND HEATING?: NOT HARD AT ALL

## 2020-08-14 ASSESSMENT — MIFFLIN-ST. JEOR: SCORE: 2008.37

## 2020-08-14 NOTE — PATIENT INSTRUCTIONS
Await labs    Reviewed concepts of diabetes self-management stressing the primary   role of the patient in monitoring and maintaining control of   diabetes.    Increase metformin to 1000 mgm bid  1)  Medication: continue current medication regimen unchanged for BP  2)  Dietary sodium restriction  3)  Regular aerobic exercise  4)  Recheck in 3-6 months, sooner should new symptoms or   problems arise.    Patient Education: Reviewed risks of hypertension and principles of   treatment.

## 2020-08-14 NOTE — NURSING NOTE
Elyssa is here today for a fasting med recheck and A1C.    Pre-visit Screening:  Immunizations:  up to date  Colonoscopy:  is up to date  Mammogram: is up to date  Asthma Action Test/Plan:  NA  PHQ9:  NA  GAD7:  NA  Questioned patient about current smoking habits Pt. has never smoked.  Ok to leave detailed message on voice mail for today's visit only Yes, phone # [phone]
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
## 2020-08-25 ENCOUNTER — RECORDS - HEALTHEAST (OUTPATIENT)
Dept: ADMINISTRATIVE | Facility: OTHER | Age: 55
End: 2020-08-25

## 2020-09-24 ENCOUNTER — OFFICE VISIT - HEALTHEAST (OUTPATIENT)
Dept: PULMONOLOGY | Facility: OTHER | Age: 55
End: 2020-09-24

## 2020-09-24 DIAGNOSIS — I50.32 CHRONIC HEART FAILURE WITH PRESERVED EJECTION FRACTION (H): ICD-10-CM

## 2020-09-24 DIAGNOSIS — G47.33 OSA (OBSTRUCTIVE SLEEP APNEA): ICD-10-CM

## 2020-09-24 DIAGNOSIS — E66.01 MORBID OBESITY WITH BMI OF 60.0-69.9, ADULT (H): ICD-10-CM

## 2020-09-24 DIAGNOSIS — I89.0 LYMPH EDEMA: ICD-10-CM

## 2020-09-24 DIAGNOSIS — J96.12 CHRONIC HYPERCAPNIC RESPIRATORY FAILURE (H): ICD-10-CM

## 2020-09-24 ASSESSMENT — MIFFLIN-ST. JEOR: SCORE: 2016.51

## 2020-09-25 ENCOUNTER — AMBULATORY - HEALTHEAST (OUTPATIENT)
Dept: PULMONOLOGY | Facility: OTHER | Age: 55
End: 2020-09-25

## 2020-09-25 ENCOUNTER — TRANSFERRED RECORDS (OUTPATIENT)
Dept: FAMILY MEDICINE | Facility: CLINIC | Age: 55
End: 2020-09-25

## 2020-09-28 ENCOUNTER — TRANSFERRED RECORDS (OUTPATIENT)
Dept: FAMILY MEDICINE | Facility: CLINIC | Age: 55
End: 2020-09-28

## 2020-10-12 ENCOUNTER — AMBULATORY - HEALTHEAST (OUTPATIENT)
Dept: PULMONOLOGY | Facility: OTHER | Age: 55
End: 2020-10-12

## 2020-10-12 ENCOUNTER — RECORDS - HEALTHEAST (OUTPATIENT)
Dept: ADMINISTRATIVE | Facility: OTHER | Age: 55
End: 2020-10-12

## 2020-10-15 ENCOUNTER — RECORDS - HEALTHEAST (OUTPATIENT)
Dept: ADMINISTRATIVE | Facility: OTHER | Age: 55
End: 2020-10-15

## 2020-10-29 ENCOUNTER — COMMUNICATION - HEALTHEAST (OUTPATIENT)
Dept: PULMONOLOGY | Facility: OTHER | Age: 55
End: 2020-10-29

## 2020-11-13 ENCOUNTER — COMMUNICATION - HEALTHEAST (OUTPATIENT)
Dept: PULMONOLOGY | Facility: OTHER | Age: 55
End: 2020-11-13

## 2020-11-23 ENCOUNTER — COMMUNICATION - HEALTHEAST (OUTPATIENT)
Dept: PULMONOLOGY | Facility: OTHER | Age: 55
End: 2020-11-23

## 2020-11-23 DIAGNOSIS — R50.9 FEVER: ICD-10-CM

## 2020-11-24 ENCOUNTER — COMMUNICATION - HEALTHEAST (OUTPATIENT)
Dept: PULMONOLOGY | Facility: OTHER | Age: 55
End: 2020-11-24

## 2020-11-30 ENCOUNTER — COMMUNICATION - HEALTHEAST (OUTPATIENT)
Dept: PULMONOLOGY | Facility: OTHER | Age: 55
End: 2020-11-30

## 2020-12-01 ENCOUNTER — AMBULATORY - HEALTHEAST (OUTPATIENT)
Dept: PULMONOLOGY | Facility: OTHER | Age: 55
End: 2020-12-01

## 2020-12-01 ENCOUNTER — RECORDS - HEALTHEAST (OUTPATIENT)
Dept: ADMINISTRATIVE | Facility: OTHER | Age: 55
End: 2020-12-01

## 2020-12-02 ENCOUNTER — COMMUNICATION - HEALTHEAST (OUTPATIENT)
Dept: PULMONOLOGY | Facility: OTHER | Age: 55
End: 2020-12-02

## 2020-12-04 ENCOUNTER — RECORDS - HEALTHEAST (OUTPATIENT)
Dept: ADMINISTRATIVE | Facility: OTHER | Age: 55
End: 2020-12-04

## 2020-12-07 ENCOUNTER — COMMUNICATION - HEALTHEAST (OUTPATIENT)
Dept: PULMONOLOGY | Facility: OTHER | Age: 55
End: 2020-12-07

## 2020-12-22 ENCOUNTER — COMMUNICATION - HEALTHEAST (OUTPATIENT)
Dept: PULMONOLOGY | Facility: OTHER | Age: 55
End: 2020-12-22

## 2020-12-23 ENCOUNTER — COMMUNICATION - HEALTHEAST (OUTPATIENT)
Dept: PULMONOLOGY | Facility: OTHER | Age: 55
End: 2020-12-23

## 2020-12-23 DIAGNOSIS — J45.21 MILD INTERMITTENT ASTHMA WITH EXACERBATION: ICD-10-CM

## 2021-01-08 ENCOUNTER — OFFICE VISIT - HEALTHEAST (OUTPATIENT)
Dept: PULMONOLOGY | Facility: OTHER | Age: 56
End: 2021-01-08

## 2021-01-08 DIAGNOSIS — J01.90 ACUTE SINUSITIS WITH SYMPTOMS > 10 DAYS: ICD-10-CM

## 2021-01-08 DIAGNOSIS — U07.1 2019 NOVEL CORONAVIRUS DISEASE (COVID-19): ICD-10-CM

## 2021-01-08 ASSESSMENT — MIFFLIN-ST. JEOR: SCORE: 1943.94

## 2021-01-09 ENCOUNTER — HEALTH MAINTENANCE LETTER (OUTPATIENT)
Age: 56
End: 2021-01-09

## 2021-01-15 ENCOUNTER — AMBULATORY - HEALTHEAST (OUTPATIENT)
Dept: PULMONOLOGY | Facility: OTHER | Age: 56
End: 2021-01-15

## 2021-01-15 DIAGNOSIS — J45.20 MILD INTERMITTENT ASTHMA: ICD-10-CM

## 2021-01-30 ENCOUNTER — HOSPITAL ENCOUNTER (OUTPATIENT)
Dept: CT IMAGING | Facility: CLINIC | Age: 56
Discharge: HOME | End: 2021-01-30
Attending: INTERNAL MEDICINE

## 2021-01-30 DIAGNOSIS — U07.1 2019 NOVEL CORONAVIRUS DISEASE (COVID-19): ICD-10-CM

## 2021-02-05 ENCOUNTER — AMBULATORY - HEALTHEAST (OUTPATIENT)
Dept: PULMONOLOGY | Facility: OTHER | Age: 56
End: 2021-02-05

## 2021-02-05 ENCOUNTER — COMMUNICATION - HEALTHEAST (OUTPATIENT)
Dept: PULMONOLOGY | Facility: OTHER | Age: 56
End: 2021-02-05

## 2021-02-05 DIAGNOSIS — U07.1 2019 NOVEL CORONAVIRUS DISEASE (COVID-19): ICD-10-CM

## 2021-02-11 ENCOUNTER — AMBULATORY - HEALTHEAST (OUTPATIENT)
Dept: PULMONOLOGY | Facility: OTHER | Age: 56
End: 2021-02-11

## 2021-02-11 DIAGNOSIS — J98.4 RESTRICTIVE LUNG DISEASE: ICD-10-CM

## 2021-02-19 ENCOUNTER — COMMUNICATION - HEALTHEAST (OUTPATIENT)
Dept: PULMONOLOGY | Facility: OTHER | Age: 56
End: 2021-02-19

## 2021-02-19 DIAGNOSIS — J98.4 RESTRICTIVE LUNG DISEASE: ICD-10-CM

## 2021-02-24 ENCOUNTER — COMMUNICATION - HEALTHEAST (OUTPATIENT)
Dept: PULMONOLOGY | Facility: OTHER | Age: 56
End: 2021-02-24

## 2021-02-24 DIAGNOSIS — R60.0 BILATERAL LEG EDEMA: ICD-10-CM

## 2021-02-25 DIAGNOSIS — I10 ESSENTIAL HYPERTENSION, BENIGN: ICD-10-CM

## 2021-02-25 DIAGNOSIS — E11.9 TYPE 2 DIABETES MELLITUS WITHOUT COMPLICATION, WITHOUT LONG-TERM CURRENT USE OF INSULIN (H): ICD-10-CM

## 2021-02-26 ENCOUNTER — MYC MEDICAL ADVICE (OUTPATIENT)
Dept: FAMILY MEDICINE | Facility: CLINIC | Age: 56
End: 2021-02-26

## 2021-02-26 DIAGNOSIS — E11.9 TYPE 2 DIABETES MELLITUS WITHOUT COMPLICATION, WITHOUT LONG-TERM CURRENT USE OF INSULIN (H): ICD-10-CM

## 2021-02-26 DIAGNOSIS — I10 ESSENTIAL HYPERTENSION, BENIGN: ICD-10-CM

## 2021-02-26 RX ORDER — LOSARTAN POTASSIUM 50 MG/1
TABLET ORAL
Qty: 90 TABLET | Refills: 0 | COMMUNITY
Start: 2021-02-26

## 2021-02-26 RX ORDER — LOSARTAN POTASSIUM 50 MG/1
50 TABLET ORAL DAILY
Qty: 14 TABLET | Refills: 0 | COMMUNITY
Start: 2021-02-26 | End: 2021-03-10

## 2021-02-26 NOTE — TELEPHONE ENCOUNTER
Received incoming refill request for  Pending Prescriptions:                       Disp   Refills    losartan (COZAAR) 50 MG tablet [Pharmacy *90 tab*0            Sig: TAKE 1 TABLET BY MOUTH ONE TIME DAILY, HOLD IF           SYSTOLIC BLOOD PRESSURE IS UNDER 115    Patient last had a refill of this medication on 8/14/2020 for a 6 month supply. The patient is now due to come in for an OV and has one scheduled for 3/10/21 and full refills will be sent at that time.

## 2021-03-04 ENCOUNTER — HOSPITAL ENCOUNTER (OUTPATIENT)
Dept: RESPIRATORY THERAPY | Facility: CLINIC | Age: 56
Discharge: HOME | End: 2021-03-04
Attending: INTERNAL MEDICINE

## 2021-03-04 DIAGNOSIS — J98.4 RESTRICTIVE LUNG DISEASE: ICD-10-CM

## 2021-03-04 LAB
BASE EXCESS BLDA CALC-SCNC: 11.3 MMOL/L
COHGB MFR BLD: 98.5 % (ref 96–97)
FLOW: 4.5 LPM
HCO3, ARTERIAL CALC - HISTORICAL: 33.4 MMOL/L (ref 23–29)
HGB BLD-MCNC: 12.5 G/DL (ref 12–16)
OXYHEMOGLOBIN - HISTORICAL: 97.6 % (ref 96–97)
PCO2 BLD: 52 MM HG (ref 35–45)
PH BLD: 7.44 [PH] (ref 7.37–7.44)
PO2 BLD: 162 MM HG (ref 80–90)
TEMPERATURE: 37 DEGREES C
VENTILATION MODE: ABNORMAL

## 2021-03-08 ENCOUNTER — COMMUNICATION - HEALTHEAST (OUTPATIENT)
Dept: MULTI SPECIALTY CLINIC | Facility: CLINIC | Age: 56
End: 2021-03-08

## 2021-03-10 ENCOUNTER — OFFICE VISIT (OUTPATIENT)
Dept: FAMILY MEDICINE | Facility: CLINIC | Age: 56
End: 2021-03-10

## 2021-03-10 VITALS
OXYGEN SATURATION: 98 % | SYSTOLIC BLOOD PRESSURE: 122 MMHG | HEART RATE: 93 BPM | TEMPERATURE: 98.2 F | BODY MASS INDEX: 59.07 KG/M2 | WEIGHT: 293 LBS | RESPIRATION RATE: 20 BRPM | HEIGHT: 59 IN | DIASTOLIC BLOOD PRESSURE: 74 MMHG

## 2021-03-10 DIAGNOSIS — K21.9 GASTROESOPHAGEAL REFLUX DISEASE WITHOUT ESOPHAGITIS: ICD-10-CM

## 2021-03-10 DIAGNOSIS — Z79.899 ENCOUNTER FOR LONG-TERM (CURRENT) USE OF MEDICATIONS: ICD-10-CM

## 2021-03-10 DIAGNOSIS — J30.1 CHRONIC SEASONAL ALLERGIC RHINITIS DUE TO POLLEN: ICD-10-CM

## 2021-03-10 DIAGNOSIS — D50.0 IRON DEFICIENCY ANEMIA DUE TO CHRONIC BLOOD LOSS: ICD-10-CM

## 2021-03-10 DIAGNOSIS — E11.9 TYPE 2 DIABETES MELLITUS WITHOUT COMPLICATION, WITHOUT LONG-TERM CURRENT USE OF INSULIN (H): Primary | ICD-10-CM

## 2021-03-10 DIAGNOSIS — E66.01 MORBID OBESITY WITH BMI OF 60.0-69.9, ADULT (H): ICD-10-CM

## 2021-03-10 DIAGNOSIS — K59.03 DRUG-INDUCED CONSTIPATION: ICD-10-CM

## 2021-03-10 DIAGNOSIS — F32.5 MAJOR DEPRESSIVE DISORDER WITH SINGLE EPISODE, IN FULL REMISSION (H): ICD-10-CM

## 2021-03-10 DIAGNOSIS — I10 ESSENTIAL HYPERTENSION, BENIGN: ICD-10-CM

## 2021-03-10 LAB
% GRANULOCYTES: 67 %
ALBUMIN SERPL-MCNC: 3.8 G/DL (ref 3.6–5.1)
ALBUMIN URINE MG/G CR: <30 MG/G CREATININE
ALBUMIN URINE MG/SPEC: 10
ALBUMIN/GLOB SERPL: 1 {RATIO} (ref 1–2.5)
ALP SERPL-CCNC: 65 U/L (ref 33–130)
ALT 1742-6: 23 U/L (ref 0–32)
AST 1920-8: 21 U/L (ref 0–35)
BILIRUB SERPL-MCNC: 0.6 MG/DL (ref 0.2–1.2)
BUN SERPL-MCNC: 12 MG/DL (ref 7–25)
BUN/CREATININE RATIO: 13 (ref 6–22)
CALCIUM SERPL-MCNC: 9.4 MG/DL (ref 8.6–10.3)
CHLORIDE SERPLBLD-SCNC: 100.1 MMOL/L (ref 98–110)
CHOLEST SERPL-MCNC: 235 MG/DL (ref 0–199)
CHOLEST/HDLC SERPL: 4 {RATIO} (ref 0–5)
CO2 SERPL-SCNC: 30.4 MMOL/L (ref 20–32)
CREAT SERPL-MCNC: 0.92 MG/DL (ref 0.6–1.3)
CREATININE URINE: 50
GLOBULIN, CALCULATED - QUEST: 3.8 (ref 1.9–3.7)
GLUCOSE SERPL-MCNC: 116 MG/DL (ref 60–99)
HBA1C MFR BLD: 6.9 % (ref 4–7)
HCT VFR BLD AUTO: 36 % (ref 35–47)
HDLC SERPL-MCNC: 56 MG/DL (ref 40–150)
HEMOGLOBIN: 11.3 G/DL (ref 11.7–15.7)
LDLC SERPL CALC-MCNC: 110 MG/DL (ref 0–130)
LYMPHOCYTES NFR BLD AUTO: 25.7 %
MCH RBC QN AUTO: 27.4 PG (ref 26–33)
MCHC RBC AUTO-ENTMCNC: 31.4 G/DL (ref 31–36)
MCV RBC AUTO: 87.1 FL (ref 78–100)
MONOCYTES NFR BLD AUTO: 7.3 %
PLATELET COUNT - QUEST: 180 10^9/L (ref 150–375)
POTASSIUM SERPL-SCNC: 3.73 MMOL/L (ref 3.5–5.3)
PROT SERPL-MCNC: 7.6 G/DL (ref 6.1–8.1)
RBC # BLD AUTO: 4.14 10*12/L (ref 3.8–5.2)
SODIUM SERPL-SCNC: 141.7 MMOL/L (ref 135–146)
TRIGL SERPL-MCNC: 346 MG/DL (ref 0–149)
WBC # BLD AUTO: 8.4 10*9/L (ref 4–11)

## 2021-03-10 PROCEDURE — 80053 COMPREHEN METABOLIC PANEL: CPT | Performed by: FAMILY MEDICINE

## 2021-03-10 PROCEDURE — 85025 COMPLETE CBC W/AUTO DIFF WBC: CPT | Performed by: FAMILY MEDICINE

## 2021-03-10 PROCEDURE — 82043 UR ALBUMIN QUANTITATIVE: CPT | Performed by: FAMILY MEDICINE

## 2021-03-10 PROCEDURE — 83036 HEMOGLOBIN GLYCOSYLATED A1C: CPT | Performed by: FAMILY MEDICINE

## 2021-03-10 PROCEDURE — 99215 OFFICE O/P EST HI 40 MIN: CPT | Performed by: FAMILY MEDICINE

## 2021-03-10 PROCEDURE — 36415 COLL VENOUS BLD VENIPUNCTURE: CPT | Performed by: FAMILY MEDICINE

## 2021-03-10 PROCEDURE — 80061 LIPID PANEL: CPT | Performed by: FAMILY MEDICINE

## 2021-03-10 RX ORDER — MULTIVITAMIN/IRON/FOLIC ACID 18MG-0.4MG
1 TABLET ORAL DAILY
Qty: 90 TABLET | Refills: 3 | Status: SHIPPED | OUTPATIENT
Start: 2021-03-10

## 2021-03-10 RX ORDER — LANCETS
EACH MISCELLANEOUS
Qty: 102 EACH | Refills: 3 | Status: SHIPPED | OUTPATIENT
Start: 2021-03-10

## 2021-03-10 RX ORDER — FAMOTIDINE 20 MG/1
20 TABLET, FILM COATED ORAL 2 TIMES DAILY
Qty: 180 TABLET | Refills: 3 | Status: SHIPPED | OUTPATIENT
Start: 2021-03-10 | End: 2022-03-23

## 2021-03-10 RX ORDER — LOSARTAN POTASSIUM 50 MG/1
50 TABLET ORAL DAILY
Qty: 90 TABLET | Refills: 1 | Status: SHIPPED | OUTPATIENT
Start: 2021-03-10 | End: 2021-09-07

## 2021-03-10 RX ORDER — MAGNESIUM OXIDE 400 MG/1
400 TABLET ORAL DAILY
Qty: 90 TABLET | Refills: 3 | Status: SHIPPED | OUTPATIENT
Start: 2021-03-10 | End: 2021-10-06

## 2021-03-10 RX ORDER — METFORMIN HCL 500 MG
1000 TABLET, EXTENDED RELEASE 24 HR ORAL 2 TIMES DAILY WITH MEALS
Qty: 360 TABLET | Refills: 1 | Status: SHIPPED | OUTPATIENT
Start: 2021-03-10 | End: 2021-09-09

## 2021-03-10 RX ORDER — CETIRIZINE HYDROCHLORIDE 10 MG/1
TABLET ORAL
Qty: 90 TABLET | Refills: 3 | Status: SHIPPED | OUTPATIENT
Start: 2021-03-10 | End: 2022-03-23

## 2021-03-10 RX ORDER — FLUTICASONE PROPIONATE 50 MCG
1 SPRAY, SUSPENSION (ML) NASAL DAILY
Qty: 16 G | Refills: 11 | Status: SHIPPED | OUTPATIENT
Start: 2021-03-10 | End: 2022-03-23

## 2021-03-10 RX ORDER — BLOOD SUGAR DIAGNOSTIC
STRIP MISCELLANEOUS
Qty: 100 STRIP | Refills: 3 | Status: SHIPPED | OUTPATIENT
Start: 2021-03-10 | End: 2022-10-31

## 2021-03-10 RX ORDER — SERTRALINE HYDROCHLORIDE 100 MG/1
TABLET, FILM COATED ORAL
Qty: 90 TABLET | Refills: 3 | Status: SHIPPED | OUTPATIENT
Start: 2021-03-10 | End: 2022-03-23

## 2021-03-10 RX ORDER — FERROUS SULFATE 325(65) MG
TABLET ORAL
Qty: 90 TABLET | Refills: 3 | Status: SHIPPED | OUTPATIENT
Start: 2021-03-10 | End: 2022-03-23

## 2021-03-10 ASSESSMENT — ANXIETY QUESTIONNAIRES
IF YOU CHECKED OFF ANY PROBLEMS ON THIS QUESTIONNAIRE, HOW DIFFICULT HAVE THESE PROBLEMS MADE IT FOR YOU TO DO YOUR WORK, TAKE CARE OF THINGS AT HOME, OR GET ALONG WITH OTHER PEOPLE: NOT DIFFICULT AT ALL
2. NOT BEING ABLE TO STOP OR CONTROL WORRYING: NOT AT ALL
1. FEELING NERVOUS, ANXIOUS, OR ON EDGE: NOT AT ALL
5. BEING SO RESTLESS THAT IT IS HARD TO SIT STILL: NOT AT ALL
GAD7 TOTAL SCORE: 1
3. WORRYING TOO MUCH ABOUT DIFFERENT THINGS: NOT AT ALL
6. BECOMING EASILY ANNOYED OR IRRITABLE: SEVERAL DAYS
7. FEELING AFRAID AS IF SOMETHING AWFUL MIGHT HAPPEN: NOT AT ALL

## 2021-03-10 ASSESSMENT — PATIENT HEALTH QUESTIONNAIRE - PHQ9
5. POOR APPETITE OR OVEREATING: NOT AT ALL
SUM OF ALL RESPONSES TO PHQ QUESTIONS 1-9: 5

## 2021-03-10 ASSESSMENT — MIFFLIN-ST. JEOR: SCORE: 1990.66
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
## 2021-03-10 NOTE — PATIENT INSTRUCTIONS
Await labs    Reviewed concepts of diabetes self-management stressing the primary   role of the patient in monitoring and maintaining control of   diabetes.    weight loss  Back to specialists

## 2021-03-10 NOTE — NURSING NOTE
Elyssa is here today for a med recheck and A1C.    Pre-visit Screening:  Immunizations:  not up to date - will wait on TD  Colonoscopy:  is up to date  Mammogram: is up to date  Asthma Action Test/Plan:  Done today  PHQ9:  Done today  GAD7:  Done today  Questioned patient about current smoking habits Pt. has never smoked.  Ok to leave detailed message on voice mail for today's visit only Yes, phone # [phone]

## 2021-03-11 ASSESSMENT — ANXIETY QUESTIONNAIRES: GAD7 TOTAL SCORE: 1

## 2021-03-16 ENCOUNTER — COMMUNICATION - HEALTHEAST (OUTPATIENT)
Dept: MULTI SPECIALTY CLINIC | Facility: CLINIC | Age: 56
End: 2021-03-16

## 2021-03-17 ENCOUNTER — RECORDS - HEALTHEAST (OUTPATIENT)
Dept: ADMINISTRATIVE | Facility: OTHER | Age: 56
End: 2021-03-17

## 2021-03-18 ENCOUNTER — COMMUNICATION - HEALTHEAST (OUTPATIENT)
Dept: PULMONOLOGY | Facility: OTHER | Age: 56
End: 2021-03-18

## 2021-03-19 ENCOUNTER — IMMUNIZATION (OUTPATIENT)
Dept: NURSING | Facility: CLINIC | Age: 56
End: 2021-03-19
Payer: COMMERCIAL

## 2021-03-19 PROCEDURE — 0011A PR COVID VAC MODERNA 100 MCG/0.5 ML IM: CPT

## 2021-03-19 PROCEDURE — 91301 PR COVID VAC MODERNA 100 MCG/0.5 ML IM: CPT

## 2021-03-22 ENCOUNTER — MYC MEDICAL ADVICE (OUTPATIENT)
Dept: FAMILY MEDICINE | Facility: CLINIC | Age: 56
End: 2021-03-22

## 2021-03-25 ENCOUNTER — OFFICE VISIT - HEALTHEAST (OUTPATIENT)
Dept: PULMONOLOGY | Facility: OTHER | Age: 56
End: 2021-03-25

## 2021-03-25 ENCOUNTER — RECORDS - HEALTHEAST (OUTPATIENT)
Dept: ADMINISTRATIVE | Facility: OTHER | Age: 56
End: 2021-03-25

## 2021-03-25 DIAGNOSIS — R60.0 BILATERAL LEG EDEMA: ICD-10-CM

## 2021-03-26 ENCOUNTER — COMMUNICATION - HEALTHEAST (OUTPATIENT)
Dept: PULMONOLOGY | Facility: OTHER | Age: 56
End: 2021-03-26

## 2021-03-26 DIAGNOSIS — R60.0 BILATERAL LEG EDEMA: ICD-10-CM

## 2021-04-07 VITALS — WEIGHT: 293 LBS | HEIGHT: 59 IN | BODY MASS INDEX: 59.07 KG/M2

## 2021-04-07 ASSESSMENT — MIFFLIN-ST. JEOR: SCORE: 1990.91

## 2021-04-07 NOTE — PATIENT INSTRUCTIONS
Your BMI is Body mass index is 66.98 kg/m .  Weight management is a personal decision.  If you are interested in exploring weight loss strategies, the following discussion covers the approaches that may be successful. Body mass index (BMI) is one way to tell whether you are at a healthy weight, overweight, or obese. It measures your weight in relation to your height.  A BMI of 18.5 to 24.9 is in the healthy range. A person with a BMI of 25 to 29.9 is considered overweight, and someone with a BMI of 30 or greater is considered obese. More than two-thirds of American adults are considered overweight or obese.  Being overweight or obese increases the risk for further weight gain. Excess weight may lead to heart disease and diabetes.  Creating and following plans for healthy eating and physical activity may help you improve your health.  Weight control is part of healthy lifestyle and includes exercise, emotional health, and healthy eating habits. Careful eating habits lifelong are the mainstay of weight control. Though there are significant health benefits from weight loss, long-term weight loss with diet alone may be very difficult to achieve- studies show long-term success with dietary management in less than 10% of people. Attaining a healthy weight may be especially difficult to achieve in those with severe obesity. In some cases, medications, devices and surgical management might be considered.  What can you do?  If you are overweight or obese and are interested in methods for weight loss, you should discuss this with your provider.     Consider reducing daily calorie intake by 500 calories.     Keep a food journal.     Avoiding skipping meals, consider cutting portions instead.    Diet combined with exercise helps maintain muscle while optimizing fat loss. Strength training is particularly important for building and maintaining muscle mass. Exercise helps reduce stress, increase energy, and improves fitness.  Increasing exercise without diet control, however, may not burn enough calories to loose weight.       Start walking three days a week 10-20 minutes at a time    Work towards walking thirty minutes five days a week     Eventually, increase the speed of your walking for 1-2 minutes at time    In addition, we recommend that you review healthy lifestyles and methods for weight loss available through the National Institutes of Health patient information sites:  http://win.niddk.nih.gov/publications/index.htm    And look into health and wellness programs that may be available through your health insurance provider, employer, local community center, or jodee club.    Weight management plan: Patient was referred to their PCP to discuss a diet and exercise plan.

## 2021-04-07 NOTE — PROGRESS NOTES
"Mirta Decker is a 56 year old female being evaluated via a billable telephone visit.     \"This telephone visit will be conducted via a call between you and your physician/provider. We have found that certain health care needs can be provided without the need for an in-person visit or physical exam.  This service lets us provide the care you need with a telephone conversation.  If a prescription is necessary we can send it directly to your pharmacy.  If lab work is needed we can place an order for that and you can then stop by our lab to have the test done at a later time.\"    Telephone visits are billed at different rates depending on your insurance coverage.  Please reach out to your insurance provider with any questions.    Patient has given verbal consent for  a Telephone visit? Yes    What telephone number would you like your provider to contact at at:  [phone]    How would you like to obtain your AVS? María Chase MA    Telephone Visit Details:     Telephone Visit Start Time: 9:00 AM    Telephone Visit End Time:9:26 AM      [facility]  Outpatient Sleep Medicine Encounter, Established Patient      Name: Mirta Decker MRN# [id number]   Age: 56 year old YOB: 1965     Date of Visit: April 8, 2021  Primary care provider: Yani Martin    Assessment and Plan:     1. DAMASO (obstructive sleep apnea)  2. Hypoxemia/hypercapnea  Patient's compliance with Pap therapy is excellent and machine reports a low AHI.  However, recovery from Covid has been prolonged and in the context of chronic hypercapnic respiratory failure and chronic hypoxemia I am questioning whether her PAP therapy settings are optimized for CO2 elimination and if hypoxemia is present (or not) at night despite supplemental oxygen at 3 L/min.  Given the chronic nature of her CO2 retention it is possible that she is more responsive to hypoxemia in which case it is possible that " supplemental oxygen is too high.  Therefore, I have ordered a Pap titration polysomnogram with transcutaneous carbon dioxide monitoring and supplemental oxygen protocol to optimize her settings.  In the meantime, I encouraged her to continue using her Pap therapy during sleep and even during the day if she feels that she needs it.    3. Morbid obesity with BMI of 60.0-69.9, adult (H)  Patients with obesity are at higher risk for sleep apnea due to fat deposition in the posterior airway and tongue.  Sleep disruption associated with untreated sleep disorders (including, but not limited to, sleep apnea) can cause hormonal disruption that predisposes individuals to gain weight.  Weight loss can lead to improvement in sleep apnea severity and sleep quality.      History:     Mirta Decker is a 56 year old female whose visit was conducted via telephone today.  I reviewed her medical chart and PAP download.  Her last visit with me was on April 16, 2020.  At that time she was compliant with Pap therapy and AHI was low.  She was using 5 L/min supplemental oxygen bleed in through the Pap circuit and anticipating a slow wean.  Medical history is remarkable for morbid obesity with a BMI of 67 kg/m  and history of chronic hypercapnic respiratory failure with obesity hypoventilation and restrictive lung disease with no bronchodilator response.  Patient reports having coronavirus infection in November and was prescribed supplemental oxygen as high as 10 L/min in addition to dexamethasone and prednisone.  Over the past couple of weeks she has weaned from 4.5 L/min supplemental oxygen around-the-clock to 3 L/min supplemental oxygen around-the-clock.  She reports continued drops in her SPO2 with activity.  Additionally, she experiences daytime fatigue.  She uses her bilevel Pap overnight and during naps.  Her score on the [place] Sleepiness Scale is 10 out of 24.    Patient is prescribed an VPAP auto machine with maximum IPAP 15,  minimum IPAP 5, pressure support 6 cm water pressure.  She uses the treatment nightly as demonstrated on adherence data from March 8 through April 6, 2021.  Usage greater than 4 hours is 100%.  Average usage is 11 hours 18 minutes per 24-hour period.  The machine reports an estimated AHI in the normal range at 3.1/h of use time.  Leak is within acceptable limits.  Pressures are consistent.  Tidal volumes are between 500 and 600 and consistent.  Respiratory rate is above 20 on all nights in the data set.    Review of the patient's most recent sleep study conducted on July 18, 2018 demonstrates an AHI of 56/h of sleep time with a desaturation kostas of 67%. She is currently prescribed VPAP Auto 5 - 16 with PS 6 with O2 at 1 - 2 LPM bled in at CPAP.    ANABEL Maynard: [place]    Arterial blood gas performed on March 4, 2021 shows pH 7.44, PCO2 52, PO2 162, HCO3 33.4.  PCO2 shows an increase (formerly 47) and she was asked to wean more aggressively from supplemental oxygen due to high PO2.    Medications:     Current Outpatient Medications   Medication Sig     acetaminophen (TYLENOL) 325 MG tablet Take 650 mg by mouth every 6 hours as needed for mild pain     albuterol (PROAIR HFA) 108 (90 Base) MCG/ACT inhaler Inhale 2 puffs into the lungs every 6 hours as needed      apremilast (OTEZLA) 30 MG tablet Take 1 tablet by mouth 2 times daily     ascorbic acid (EQL VITAMIN C) 500 MG TABS Take 1 tablet (500 mg) by mouth daily With iron     blood glucose (ACCU-CHEK GUIDE) test strip Use to test blood sugar 3 times daily or as directed.     blood glucose monitoring (ACCU-CHEK FASTCLIX) lancets Test 4 times a day     budesonide (PULMICORT) 0.5 MG/2ML neb solution Take 0.5 mg by nebulization 2 times daily as needed     calcipotriene (DOVONOX) 0.005 % external cream Apply topically nightly as needed     cetirizine (ZYRTEC) 10 MG tablet TAKE 1 TABLET BY MOUTH ONE TIME DAILY     famotidine (PEPCID) 20 MG tablet Take 1 tablet (20 mg)  by mouth 2 times daily     ferrous sulfate (FEROSUL) 325 (65 Fe) MG tablet Take 1 tablet (325 mg) by mouth daily     fluticasone (FLONASE) 50 MCG/ACT nasal spray Spray 1 spray into both nostrils daily     folic acid (FOLVITE) 1 MG tablet 1 tablet (1 mg) daily     ipratropium - albuterol 0.5 mg/2.5 mg/3 mL (DUONEB) 0.5-2.5 (3) MG/3ML neb solution Take 1 vial by nebulization every 4 hours as needed for shortness of breath / dyspnea or wheezing     losartan (COZAAR) 50 MG tablet Take 1 tablet (50 mg) by mouth daily HOLD if systolic Blood pressure is under 115     magnesium oxide (MAG-OX) 400 MG tablet Take 1 tablet (400 mg) by mouth daily     metFORMIN (GLUCOPHAGE-XR) 500 MG 24 hr tablet Take 2 tablets (1,000 mg) by mouth 2 times daily (with meals)     metolazone (ZAROXOLYN) 2.5 MG tablet Take 1 tablet (2.5 mg) by mouth daily as needed , to take every 3-5 days as needed. Hold for systolic blood pressure under 110     Multiple Vitamins-Minerals (CENTROVITE) TABS Take 1 tablet by mouth daily     nystatin (MYCOSTATIN) 665008 UNIT/GM external powder Apply topically 2 times daily as needed , under breasts     ondansetron (ZOFRAN) 4 MG tablet Take 1 tablet (4 mg) by mouth every 8 hours as needed for nausea     order for DME 1: Custom BLE knee high compression stockings; 2: Custom kevin compression pant; 3; BLE knee high 20-30 mm Gh compression stockings     polyethylene glycol (MIRALAX/GLYCOLAX) Packet Take 17 g by mouth daily     senna (SM SENNA LAXATIVE) 8.6 MG tablet Take 1 tablet by mouth daily     sertraline (ZOLOFT) 100 MG tablet TAKE 1 TABLET BY MOUTH ONE TIME DAILY     torsemide (DEMADEX) 20 MG tablet Take 80 mg by mouth 2 times daily , takes in the morning & at 2pm     No current facility-administered medications for this visit.         Allergies   Allergen Reactions     Lisinopril      Penicillin G      Penicillins Hives       Past Medical History:     Past Medical History:   Diagnosis Date     Anxiety       "Arthritis      Depression      History of blood transfusion      Hypertension      Psoriasis      Psoriatic arthritis (H)         Physical Examination:   Ht 1.499 m (4' 11.02\")   Wt (!) 150.5 kg (331 lb 12.8 oz)   BMI 66.98 kg/m         Olga Santo MD   4/8/2021   [address], [address]  [place] [address] [phone] morning how are you doing today time    Copy to: Yani Martin    "

## 2021-04-08 ENCOUNTER — VIRTUAL VISIT (OUTPATIENT)
Dept: SLEEP MEDICINE | Facility: CLINIC | Age: 56
End: 2021-04-08
Payer: COMMERCIAL

## 2021-04-08 DIAGNOSIS — R09.02 HYPOXEMIA: ICD-10-CM

## 2021-04-08 DIAGNOSIS — E66.01 MORBID OBESITY WITH BMI OF 60.0-69.9, ADULT (H): ICD-10-CM

## 2021-04-08 DIAGNOSIS — G47.33 OSA (OBSTRUCTIVE SLEEP APNEA): Primary | ICD-10-CM

## 2021-04-08 PROCEDURE — 99214 OFFICE O/P EST MOD 30 MIN: CPT | Mod: TEL | Performed by: PEDIATRICS

## 2021-04-16 ENCOUNTER — IMMUNIZATION (OUTPATIENT)
Dept: NURSING | Facility: CLINIC | Age: 56
End: 2021-04-16
Attending: INTERNAL MEDICINE
Payer: COMMERCIAL

## 2021-04-16 PROCEDURE — 0012A PR COVID VAC MODERNA 100 MCG/0.5 ML IM: CPT

## 2021-04-16 PROCEDURE — 91301 PR COVID VAC MODERNA 100 MCG/0.5 ML IM: CPT

## 2021-04-28 NOTE — NURSING NOTE
Pt called and message left for pt to call back to schedule Titration study.    EDGAR Hunter

## 2021-05-26 NOTE — PROGRESS NOTES
Oxygen saturation walk test    Patient oxygen saturation on RA at rest is 91%.  Oxygen saturation after ambulating 75ft on RA is 84%.     Oxygen continuous dose testing  While ambulating 75ft on 2LPM continuous dose, oxygen saturation is 90%.      DME Provider: currently with [facility], but she wants to switch for [place]    Patient is ambulatory within his/her home.
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
## 2021-05-27 NOTE — PROGRESS NOTES
DME Provider: [facility]  Date Faxed: 3/39/19  Ordering Provider: Dr. Villalobos  Equipment ordered: POC with activity and oxygen with her BiPap at night  (pt wants to switch from [facility] to [place])

## 2021-05-28 NOTE — PROGRESS NOTES
She is a 54 y.o. y/o female patient who comes to the sleep medicine clinic for PAP therapy follow up.    DME: [facility]    She was diagnosed with severe OSAH (AHI=56.4)  and has been using nPAP therapy.  Initial PAP settings: VPAP in 11 / 5 cmH2O Spontaneous mode (Ti 0.3-2 s; T &C= medium) with oxygen supplementation 1 L/min.    Current PAP settings: above    Her symptoms are improved since she started using CPAP.  She feels more refreshed when she wakes up.    She denies any PAP intolerance. She is using the device and tolerates the pressure .  She is gasping in her sleep and sometimes feels like she wakes without enough pressure    30-Days Efficacy and Compliance Data  Residual AHI: 10.6 cmH2O  Leak: 30 LPM (95%)  Days used > 4 hours: 30/30  Average usage per night: 9:46 hour  Median VT: 414 mL  Median RR: 19 bpm   Median MV: 8.3 LPM  Mask Tolerance: OK (loose)  Skin irritation: None    Physical Exam:  /68 (Patient Site: Right Arm, Patient Position: Sitting, Cuff Size: Adult Large)   Pulse 80   Ht 5' (1.524 m)   Wt (!) 360 lb 14.4 oz (163.7 kg)   SpO2 96%   BMI 70.48 kg/m    General appearance: No apparent distress, well groomed.  Head: Normocephalic, atraumatic.  Musculoskeletal: 1+ edema noted.  No clubbing or cyanosis.  Skin: Warm, dry, intact.  Neurologic: Alert and oriented to person, place and time   Gait is slow with 4WW  Psychiatric: Affect pleasant.  Mood fine.     Labs/Studies:  I reviewed the efficacy and compliance report from his device. Data summarized on the HPI.     Assessment and Plan:  1. Obstructive Sleep Apnea.  Residual AHI is elevated  Compliance is good  Patient is benefiting from using PAP therapy.    We will change the pressure settings to: VAuto 15 - 5 with PS 6 cmH2O  Requesting oxy tube for CPAP for bleed in.    We counseled the patient on the importance of using her PAP device every night and the risks of not treating sleep apnea.      Patient verbalized understanding of  these issues, agrees with the plan and all questions were answered today. Patient was given an opportuntity to voice any other symptoms or concerns not listed above. Patient did not have any other symptoms or concerns.      Patient told to return in 3 months.     Deniz KUMAR Board Certified in Internal Medicine and Sleep Medicine  [facility].    We spent a total of 15 minutes of face-to-face encounter and more than 50% of the encounter was used for counseling or coordination of care.
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
## 2021-05-29 NOTE — PATIENT INSTRUCTIONS - HE
Trial of Metolazone 30 minutes prior to torsemide. Take 6/6/19 and then watch weight over the weekend on 80/80.    Goal is to get below 350. Call Monday and check in with Holli.

## 2021-05-29 NOTE — PROGRESS NOTES
I called [facility] to check on the status of Elyssa's oxygen (POC) and [facility] said that they were not able to accept her transfer from [facility] at this time.  [facility] said that she called Elyssa on 5/16/19 to let her know this info. I then called Elyssa to let her know this info (she said that she never got a message) but that she will have to check around and see what her other options are and also talk with her .  She has to call back on Monday to let us know some other info, so she will let us know what she wants us to do at that time.

## 2021-05-29 NOTE — TELEPHONE ENCOUNTER
Mirta called with an update on her weight, Baseline was 354, on June 6 th she took Metolazone  Along with her 80 mg of Torsemide twice a day.  Her weight now is 350.5. She is calling for the plan going forward. Message sent to Dr. Villalobos. Dr Villalobos would like her to take another metolazone if her weight goes to 352 or higher. Instructed to call with any questions or concerns.    V

## 2021-05-29 NOTE — PROGRESS NOTES
Order for Durable Medical Equipment was processed and equipment ordered.     DME provider: [facility]     Date Faxed: 5/21/2019    Ordering Provider: Deniz Marquez MD    PAP Order Type: Mask/Supplies    Fax Number: [phone]

## 2021-06-01 VITALS — BODY MASS INDEX: 60.93 KG/M2 | HEIGHT: 60 IN

## 2021-06-01 VITALS — BODY MASS INDEX: 60.93 KG/M2 | WEIGHT: 293 LBS

## 2021-06-01 VITALS — BODY MASS INDEX: 57.52 KG/M2 | WEIGHT: 293 LBS | HEIGHT: 60 IN

## 2021-06-01 VITALS — BODY MASS INDEX: 57.52 KG/M2 | HEIGHT: 60 IN | WEIGHT: 293 LBS

## 2021-06-01 VITALS — HEIGHT: 60 IN | BODY MASS INDEX: 57.52 KG/M2 | WEIGHT: 293 LBS
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
## 2021-06-01 NOTE — PROGRESS NOTES
Handicap parking documentation completed per Dr. Villalobos and placed in the mail today to go back to Mirta..

## 2021-06-02 VITALS — BODY MASS INDEX: 57.52 KG/M2 | HEIGHT: 60 IN | WEIGHT: 293 LBS

## 2021-06-02 VITALS — WEIGHT: 293 LBS | BODY MASS INDEX: 57.52 KG/M2 | HEIGHT: 60 IN

## 2021-06-02 VITALS — WEIGHT: 293 LBS | BODY MASS INDEX: 66.73 KG/M2

## 2021-06-02 VITALS — HEIGHT: 60 IN | BODY MASS INDEX: 57.52 KG/M2 | WEIGHT: 293 LBS

## 2021-06-02 VITALS — BODY MASS INDEX: 65.02 KG/M2 | WEIGHT: 293 LBS

## 2021-06-02 VITALS — WEIGHT: 293 LBS | BODY MASS INDEX: 67.14 KG/M2

## 2021-06-02 NOTE — PROGRESS NOTES
Dr. Villalobos's chart notes from 3-1-2019 thru 10-2-2019 faxed to [employer] ([employer]) per request.  Fax:  [phone]

## 2021-06-02 NOTE — TELEPHONE ENCOUNTER
Please contact [facility] to obtain 30-day therapy and 7-day detailed download ASAP.  Deniz Marquez MD  4:07 PM, 10/18/2019

## 2021-06-03 VITALS
WEIGHT: 293 LBS | OXYGEN SATURATION: 96 % | HEART RATE: 72 BPM | SYSTOLIC BLOOD PRESSURE: 106 MMHG | BODY MASS INDEX: 68.35 KG/M2 | RESPIRATION RATE: 20 BRPM | DIASTOLIC BLOOD PRESSURE: 74 MMHG

## 2021-06-03 VITALS
RESPIRATION RATE: 28 BRPM | OXYGEN SATURATION: 96 % | SYSTOLIC BLOOD PRESSURE: 124 MMHG | HEART RATE: 92 BPM | BODY MASS INDEX: 69.9 KG/M2 | WEIGHT: 293 LBS | DIASTOLIC BLOOD PRESSURE: 82 MMHG

## 2021-06-03 VITALS — WEIGHT: 293 LBS | BODY MASS INDEX: 57.52 KG/M2 | HEIGHT: 60 IN

## 2021-06-04 ENCOUNTER — RECORDS - HEALTHEAST (OUTPATIENT)
Dept: ADMINISTRATIVE | Facility: OTHER | Age: 56
End: 2021-06-04

## 2021-06-04 VITALS
WEIGHT: 293 LBS | OXYGEN SATURATION: 96 % | BODY MASS INDEX: 69.82 KG/M2 | RESPIRATION RATE: 24 BRPM | HEART RATE: 80 BPM | DIASTOLIC BLOOD PRESSURE: 78 MMHG | SYSTOLIC BLOOD PRESSURE: 118 MMHG

## 2021-06-04 VITALS
HEIGHT: 60 IN | BODY MASS INDEX: 57.52 KG/M2 | DIASTOLIC BLOOD PRESSURE: 70 MMHG | WEIGHT: 293 LBS | SYSTOLIC BLOOD PRESSURE: 134 MMHG | TEMPERATURE: 97 F | HEART RATE: 88 BPM | OXYGEN SATURATION: 92 %

## 2021-06-04 VITALS
BODY MASS INDEX: 57.52 KG/M2 | SYSTOLIC BLOOD PRESSURE: 110 MMHG | WEIGHT: 293 LBS | HEIGHT: 60 IN | HEART RATE: 106 BPM | OXYGEN SATURATION: 97 % | DIASTOLIC BLOOD PRESSURE: 82 MMHG

## 2021-06-04 NOTE — TELEPHONE ENCOUNTER
Called melissa to let her know her creatinine and potassium are ok, but Dr. Villalobos would like her to  repeat an ABG because your  bicarbonate is now elevated. Transferred to scheduling to set up an appointment.

## 2021-06-04 NOTE — TELEPHONE ENCOUNTER
Mirta called to say she has completed the prednisone and antibiotic and is not back to feeling herself yet. Continues the need to sleep sitting up and her cough is still frequent. Will give prednisone 40 mg x 7 days per Dr. Nye. Instructed to call if no improvement.

## 2021-06-04 NOTE — TELEPHONE ENCOUNTER
Call from patient. States she has developed a cough/cold and has been unable to sleep at night-time. She has been using plain robitussin, but does not seem to help at night when she lays down to sleep.    Reviewed with Dr. Villalobos and will prescribe prednisone 40mg x 5 days and Zpack.
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
## 2021-06-05 VITALS
OXYGEN SATURATION: 98 % | SYSTOLIC BLOOD PRESSURE: 147 MMHG | HEIGHT: 60 IN | DIASTOLIC BLOOD PRESSURE: 85 MMHG | WEIGHT: 293 LBS | HEART RATE: 80 BPM | BODY MASS INDEX: 57.52 KG/M2

## 2021-06-06 NOTE — TELEPHONE ENCOUNTER
Daughter called with concern about her mom coming home from the hospital today. Said she is still running a low grade fever with chills, her O2 is at 3 liters and that is only keeping her sat's in the low 90's . said she is off of all her dieretics and her ankles are swelling and one is more red  then the other and warm to the touch. Instructed to call the hospital back and talk with the nurse that discharged her and then to please call me back. May need to go to the Ed to be evaluated. Message sent to Md to see if we should restart her diuretics at this time and daughter will call tomorrow with an update. Will let her know if diuretic's are to be restarted.

## 2021-06-06 NOTE — TELEPHONE ENCOUNTER
Called Mirta to let her know she should stop her Zaroxolyn, cut her Torsemide in half,  To 40 mg two times a day. Will have repeat ABGs drawn again on Friday and if not feeling well at all or feels like she is retaining fluid to call us right away. Will call her back in the morning to make sure she has all the instructions correctly. Phone number left.

## 2021-06-06 NOTE — TELEPHONE ENCOUNTER
Chris called with an update, said Mirta is a little more comfortable her O2 SATs are running around 90 to 92 % at rest but drop to the low 70's to 60 's with activity. Has a red hard, hard ,warm sore area on her leg and it looks a little bit worse today. Weight is also up from 357 lbs on Sunday to 364 lbs today. Message sent to Dr. Villalobos . Asked if still running a fever and she said it is being controlled with Tylenol. Instructed to go to the Ed to have her leg and breathing evaluated. They agreed with the plan. And Informed I would call and leave a message if there is a change to her dieretic. Will call with an update tomorrow.

## 2021-06-06 NOTE — PROGRESS NOTES
Patient came in for ABG draw on 2/11/2020. She is currently on 3 LPM nasal cannula continuously so the ABG was done on 3 LPM at rest.  Previous Arterial Blood Gas result on 9/5/2018:  pH 7.48; pCO2 46; pO2 72; HCO3 32.2, %O2 Sat 93.8% in RA.    Current Arterial Blood Gas result on 2/11/2020:  pH 7.57; pCO2 44; pO2 126; HCO3 37.6, %O2 Sat 98% on 3 LPM nasal cannula.      Results for CLEVELAND KUMAR (MRN [id number]) as of 2/11/2020 11:33   Ref. Range 2/11/2020 11:10   pH, Arterial Latest Ref Range: 7.37 - 7.44  7.57 (HH)   pCO2, Arterial Latest Ref Range: 35 - 45 mm Hg 44   pO2, Arterial Latest Ref Range: 80 - 90 mm Hg 126 (H)   Bicarbonate, Arterial Calc Latest Ref Range: 23.0 - 29.0 mmol/L 37.6 (H)   O2 Sat, Arterial Latest Ref Range: 96.0 - 97.0 % 98.0 (H)   Oxyhemoglobin Latest Ref Range: 96.0 - 97.0 % 95.4 (L)   POC Base Excess Calc Latest Units: mmol/L 16.3   Ventilation Mode Unknown Nasal cannula   Sample Stabilized Temperature Latest Units: degrees C 37.0   Flow Latest Units: LPM 3.0   KIKA Mcneil, RRT, NPS

## 2021-06-06 NOTE — TELEPHONE ENCOUNTER
Call from Elyssa with update.  States her weight has been up and down this week, but seems to be fairly stable at #355.  She has been holding the zaroxolyn and taking torsemide 80mg two times a day.    Reviewed with Dr. Villalobos.  Will have her continue same medications for now and see how she does.  Will continue to hold zaroxolyn and take torsemide 80mg two times a day. Mirta if good with that plan and will call if anything changes. She has follow up appt scheduled with Dr. Villalobos for March 6.
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
## 2021-06-06 NOTE — PROGRESS NOTES
ABGs done on left wrist, on 4L NC conserving device, patient left the lab without any difficulties.    
Spontaneous

## 2021-06-06 NOTE — TELEPHONE ENCOUNTER
Martha called with an update,said Elyssa is doing a little better. Said her O2 levels are staying in the 90's at rest and do still fall when she is up and about. Said she did not take a Zaroxolyn yesterday and her weight is up another 3 lbs. Said that is actually down  a couple lbs from the day before. Instructed to take her Zaroxolyn today and as needed going forward . Instructed to call with any questions or concerns and with an update on Monday sooner if needed.If symptoms get worse to go to the Ed to be evaluated, Agreed with the plan.

## 2021-06-06 NOTE — TELEPHONE ENCOUNTER
Spoke with patient over the phone per Dr. Villalobos. Patient's weight up 7 lbs since last Tuesday with diuretics adjustment. Per Dr. Villalobos, instructed patient to continue holding zaroxolyn, but increase torsemide back up to 80mg two times a day and call back with weight update on Friday. Provided patient with Sonal Brown RN, number for patient to call on Friday with weight update.

## 2021-06-06 NOTE — TELEPHONE ENCOUNTER
Called Yefri back to let her know to yes restart the Torsemide as before. Instructed to check her weight in the morning as well. Per Dr. Villalobos

## 2021-06-06 NOTE — TELEPHONE ENCOUNTER
Called Mirta to let her know er ABG's look a little better. Will update Dr. Villalobos.Mirta says she feels pretty good today and does not have any   puffiness . Her weight however is up 4 lbs since we cut back on her dieretics. Instructed to go to Ed if any problems over the week end and Other nurses phone number given to call on Monday with an update.

## 2021-06-06 NOTE — TELEPHONE ENCOUNTER
Mirta called to say she is starting to feel better, her weight is back to 357. Her leg is starting to get better but is still very painful and her breathing is ok when sitting but her O2 sat's are still dropping with activity and she is requiring a little more O2 at this time. Will call with any questions or concerns.
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
## 2021-06-11 NOTE — PROGRESS NOTES
Oxygen saturation walk test      Oxygen pulse dose testing  While ambulating 150ft on 2LPM at pulse dose, oxygen saturation is 84%.     While ambulating 150ft on 4LPM at pulse dose, oxygen saturation is 88%.     DME Provider: [facility]    Patient is ambulatory within his/her home.

## 2021-06-11 NOTE — PROGRESS NOTES
[facility] paperwork for patient's daughter, Martha, faxed to :  NITHYA Rubin,  Fax: [phone].    Copy scanned to patient's chart and original copy mailed to patient at her home address listed on her chart.

## 2021-06-12 NOTE — TELEPHONE ENCOUNTER
Phone call to Elyssa with Results of KAILEY testing.  Unable to reach via phone.  Left detailed VM message with results.  Per :  No dignificant desats on current settings.  KAILEY testing done on 5 LPM oxygen (+Bipap).  She should continue on the 5 LPM oxygen.    Left my phone number to call back if any questions.

## 2021-06-13 NOTE — PROGRESS NOTES
Mirta called and is requesting a larger at home concentrator. Has been needing to go up on her O2 as high as 10 liters with activity  since testing positive to Covid. Order sent to [facility] requesting a larger home concentrator.

## 2021-06-13 NOTE — TELEPHONE ENCOUNTER
Mirta called with an update. Said she tested positive for Covid early last week. Said she is at day ten and is now starting to feel pretty good. Was started on dexamethasone and antibiotics when symptoms began. O2 sats for the most part are in the 90s do drop to the 80s with activity and do rebound pretty quickly, Is at 5 liters of O2 per nasal canula and has gone up to as high as 10 liters for short periods, Is wondering if she needs to take any more medications. Will update Dr. Villalobos. And instructed Mirta to call with any questions or concerns.

## 2021-06-13 NOTE — TELEPHONE ENCOUNTER
Called to see how Elyssa is doing today. Daughter Martha called back and said she is doing much better and is starting to  her O2 , down from 10 to 15 liters to 7.5 to 8 liters and said she is feeling pretty good.

## 2021-06-13 NOTE — TELEPHONE ENCOUNTER
Daughter called to say Mirta received the larger O2 concentrator yesterday and sanjana it has been extremely helpful. O2 sat's are in the low to mid 90's said she is using the 10 liters with activity and 8 liters at rest. Other wise is doing pretty good. Said she really feels a lot better today. Instructed to go to the Ed or call if sat's stay down or she has any concerns,

## 2021-06-13 NOTE — TELEPHONE ENCOUNTER
Elyssa called to say she has received a Covid saliva test to be done at home today. Did the test before starting the medicine given to her yesterday. Will call for any questions or concerns.

## 2021-06-13 NOTE — TELEPHONE ENCOUNTER
Called to see how Mirta is feeling, said she is doing much better. Started going down slowly on her O2 and is back down to 6 liters. Is able to get up to the bathroom by herself and O2 saturations are not dropping as much. Over all said she is much better.

## 2021-06-13 NOTE — TELEPHONE ENCOUNTER
Mirta called with concerns that she has Covid. Her  and both her daughters have tested positive. She ordered a test from [facility] and it never came. She has had a fever the last day as high as  100.0 and has started to have a cough as well. She is very scared and really not able to leave Thre house to be tested. Will start Dexamethazone 6 mg x 5 days and Doxycycline 100 mg two times a day x 7 days per Dr. Ochoa. Instructed to treat her symptoms and monitor her O2 needs and if  Her sat's start to drop to go to the Ed. Instructed to call before going and also instructed to call me with any questions or concerns

## 2021-06-13 NOTE — TELEPHONE ENCOUNTER
Elyssa called to say her  has tested positive for Covid and is isolating in the basement. She and her daughters are being tested on Monday. Instructed to treat her symptoms if she has them and watch her O2 sat's. If sat's start to drop go to the Ed to be evaluated.
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
## 2021-06-15 NOTE — TELEPHONE ENCOUNTER
Mirta called for the results of her chest Ct, per Dr. Villalobos she said will recheck in 6 months and to call with any questions or concerns.

## 2021-06-15 NOTE — TELEPHONE ENCOUNTER
"Phone call from Elyssa.  State she is continuing to have \"wet\" cough . Said she called last week and was told to call back this week if no better.  Asking if she should have antibiotic and/or prednisone?  Family feels her cough is worse.  Weight is at baseline- now #317. sats are ok and she is down to 4.5 LPM of oxygen.     Reviewed with Dr. Villalobos and will prescribe prednisone taper and doxycycline    "

## 2021-06-15 NOTE — TELEPHONE ENCOUNTER
Called Mirta to let her know her blood gas is about the same as a year ago. PCO2 47-->52 so a little worse but not terrible. She will have Wells take a look at your Bipap settings when she sees  you next time. No other questions at this time. Instructed to call with questions or concerns. Asked about PFT results and informed they are not done and we will call her when they are done.
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
## 2021-06-16 NOTE — PROGRESS NOTES
S: Patient is a 54 y/o female seen at [facility] for an adjustment to her bilateral AFOs.    O: Patient is largely sedimentary and cannot walk, as reported by her family. Patient's AFOs needed to be modified due to the fact that the patient was developing an ulcer on the posterior portion of her heel, per her PT, Kalpesh.     A: I heated a relieved the problem area and also added padding to the calf section of the AFO which was previously provided by Marisabel to the patient in case this issues would arise.     P: Patient's family was asked to call our office if any further issues arise with the patient's AFOs.

## 2021-06-16 NOTE — TELEPHONE ENCOUNTER
Called and spoke with Elyssa.  She had questions regarding her COVID-19 vaccine scheduled for this Friday.    Per Dr. Villalobos:  She needs to coordinate the rheum because I think she s on a biologic and it may need to be timed but no issues from my end      Elyssa states she just saw Rheum yesterday and she said it would be fine for her to get the vaccine, so she will go ahead with vaccination on Friday.
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
## 2021-06-16 NOTE — TELEPHONE ENCOUNTER
Mirta called to see if it is ok for her to get her Covid vaccine on Friday. Said she was able to make appointments for her and her  on Friday. I talked with Dr. Christianson and Mirta's Covid was diagnosis about for months ago and that it would be ok for her to get it. Did also send a message to Dr. Villalobos and if any concerns will call Elyssa by Friday.

## 2021-06-17 NOTE — PROGRESS NOTES
Order for sleep study received, reviewed, and approved.     Complex respiratory physiology. Recently released from [place], need to assess if test should be at [facility] or [facility].
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
## 2021-06-19 NOTE — PROGRESS NOTES
Order for Durable Medical Equipment was processed and equipment ordered.     DME provider: [facility]    Date Faxed: 8/2/18    Ordering Provider: Dr. Adkins    Equipment ordered: [facility]

## 2021-06-19 NOTE — LETTER
Letter by Lois Villalobos MD at      Author: Lois Villalobos MD Service: -- Author Type: --    Filed:  Encounter Date: 9/4/2019 Status: (Other)         September 4, 2019     Yani Martin MD  [address] [place] [address]    Patient: Mirta Decker   MR Number: [id number]   YOB: 1965   Date of Visit: 9/4/2019     Dear Dr. Mario MD:    Thank you for referring Mirta Decker to me for evaluation. Below are the relevant portions of my assessment and plan of care.    If you have questions, please do not hesitate to call me. I look forward to following Mirta along with you.    Sincerely,        Lois Villalobos MD        CC  No Recipients    Progress Notes:

## 2021-06-20 NOTE — LETTER
Letter by Ruth Carbone RN at      Author: Ruth Carbone RN Service: -- Author Type: --    Filed:  Encounter Date: 6/10/2020 Status: (Other)       6/10/2020        Mirta Decker  [address] [facility]  [place] [address]    This letter provides a written record that you were tested for COVID-19 on 6/9/20.     Your result was negative.    This means that we didnt find the virus that causes COVID-19 in your sample. A test may show negative when you do actually have the virus. This can happen when the virus is in the early stages of infection, before you feel illness symptoms.    Even if you dont have symptoms, they may still appear. For safety, its very important to follow these rules.    Keep yourself away from others (self-isolation):      Stay home. Dont go to work, school or anywhere else.     Stay in your own room (and use your own bathroom), if you can.    Stay away from others in your home. No hugging, kissing or shaking hands. No visitors.    Clean high touch surfaces often (doorknobs, counters, handles, etc.). Use a household cleaning spray or wipes.    Cover your mouth and nose with a mask, tissue or washcloth to avoid spreading germs.    Wash your hands and face often with soap and water.    Stay in self-isolation until you meet ALL of the guidelines below:    1. You have had no fever for at least 72 hours (that is 3 full days of no fever without the use of medicine that reduces fevers), AND  2. other symptoms (such as cough, shortness of breath) have gotten better, AND  3. at least 10 days have passed since your symptoms first appeared.    Going back to work  Check with your employer for any guidelines to follow for going back to work.    Employers: This document serves as formal notice that your employee tested negative for COVID-19, as of the testing date shown above.    For questions regarding this letter or your Negative COVID-19 result, call [phone] between 8A to 6:30P (M-F) and 10A to  6:30P (weekends).

## 2021-06-20 NOTE — PROGRESS NOTES
PFT NOTE    Pt gave good effort & was cooperative, was able to meet ATS standards for acceptability and repeatability. albuterol was given for the bronchodilator. Patient left in no distress.    Maribel Jackman, LRT      RESPIRATORY CARE NOTE     Patient Name: Mirta Decker  Today's Date: 9/26/2018     Problem List  Patient Active Problem List   Diagnosis     Bilateral ankle pain     Claustrophobia     Closed fracture of both ankles     Closed fracture of eighth thoracic vertebra (H)     Closed fracture of ninth thoracic vertebra (H)     Closed unstable burst fracture of eighth thoracic vertebra (H)     Benign essential hypertension     Morbid obesity with BMI of 60.0-69.9, adult (H)     Psoriasis with arthropathy (H)     Unstable burst fracture of T9 vertebra (H)     Trapped lung     Hemothorax, right     SARA (generalized anxiety disorder)     Panic disorder     Adjustment disorder with mixed anxiety and depressed mood     Chronic hypercapnic respiratory failure (H)                           Maribel Jackman
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
## 2021-06-20 NOTE — PROGRESS NOTES
ABG drawn on patient per Pulmonary MD order. Patient on RA, PH 7.48, PCO2 46, PO@ 72, BICARB 32.2, SAO2 96%, base excess 9.5. Patient tolerated well and left in no distress.     RESPIRATORY CARE NOTE     Patient Name: Mirta Decker  Today's Date: 9/5/2018     Problem List  Patient Active Problem List   Diagnosis     Bilateral ankle pain     Claustrophobia     Closed fracture of both ankles     Closed fracture of eighth thoracic vertebra (H)     Closed fracture of ninth thoracic vertebra (H)     Closed unstable burst fracture of eighth thoracic vertebra (H)     Benign essential hypertension     Morbid obesity with BMI of 60.0-69.9, adult (H)     Psoriasis with arthropathy (H)     Unstable burst fracture of T9 vertebra (H)     Trapped lung     Hemothorax, right     SARA (generalized anxiety disorder)     Panic disorder     Adjustment disorder with mixed anxiety and depressed mood     Chronic hypercapnic respiratory failure (H)                           Edelmira Garcia LRT

## 2021-06-20 NOTE — PROGRESS NOTES
CD of images received from [facility]     Images on CD is  CT-Chest completed on 3/5/18  CRX completed on 2/24/18  CT-Chest completed on 2/23/18    CD sent to radiology to be upload into [facility].
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
## 2021-06-22 NOTE — PROGRESS NOTES
Oxygen saturation walk test    Patient oxygen saturation on RA at rest is 89%.  Oxygen saturation after ambulating 150ft on RA is 79%.       Oxygen continuous dose testing  While [id number] on 1 LPM continuous dose, oxygen saturation is 84%.  While [id number] on 2 LPM continuous dose, oxygen saturation is 91%.      DME Provider:[facility]    Patient is ambulatory within his/her home.
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
## 2021-06-23 NOTE — PROGRESS NOTES
LMTCB re: if she heard from her oxygen company ([employer] [facility]) about getting a smaller tank or portable concentrator.  She uses a walker and is not able to pull the big machine or tanks while using the walker.  Dr. Villalobos just wanted to check in with her and make sure that she is getting what she needs and make sure that she doesn't need anything from us.

## 2021-06-23 NOTE — PROGRESS NOTES
Patient instructed in use of Breo ellipta inhaler.  Patient states good understanding of how to use the ellipta device.  Printed instructions as well as phone numbers to call with any questions sent home with patient.

## 2021-06-23 NOTE — PROGRESS NOTES
Pt called back and LM that she did get smaller tanks today that she is able to put in her walker.  She is ok with this for now, she still wants to try to get a POC, but we will need to see if she qualifies for pulse dose at her next appt.

## 2021-07-03 ENCOUNTER — HEALTH MAINTENANCE LETTER (OUTPATIENT)
Age: 56
End: 2021-07-03

## 2021-07-03 NOTE — ADDENDUM NOTE
Addendum Note by Blank Villalobos MD at 12/10/2019 11:00 AM     Author: Blank Villalobos MD Service: -- Author Type: Physician    Filed: 12/11/2019 11:32 AM Encounter Date: 12/10/2019 Status: Signed    : Blank Villalobos MD (Physician)    Addended by: BLANK VILLALOBOS on: 12/11/2019 11:32 AM        Modules accepted: Orders

## 2021-08-06 ENCOUNTER — HOSPITAL ENCOUNTER (OUTPATIENT)
Dept: CT IMAGING | Facility: CLINIC | Age: 56
Discharge: HOME | End: 2021-08-06
Attending: INTERNAL MEDICINE | Admitting: INTERNAL MEDICINE
Payer: COMMERCIAL

## 2021-08-06 DIAGNOSIS — U07.1 2019 NOVEL CORONAVIRUS DISEASE (COVID-19): ICD-10-CM

## 2021-08-06 PROCEDURE — 71250 CT THORAX DX C-: CPT

## 2021-08-09 ENCOUNTER — TELEPHONE (OUTPATIENT)
Dept: PULMONOLOGY | Facility: OTHER | Age: 56
End: 2021-08-09

## 2021-08-09 NOTE — TELEPHONE ENCOUNTER
Phone call from Elyssa with questions for Dr. Villalobos:     1) They have trip planned to [place] to visit family. (was cancelled when COVID first broke out). Plan is to fly to [place] on 8/21 and then drive to [place] for a few days, then back to [place].  Visiting father (or father-in-law with cancer).  Everyone they would be in contact with there is fully vaccinated and most of her time would be spent by the pool. She has been feeling ok.  Has slight cough, but thinks this may be allergy.  Has been staying inside with the poor air quality.  Would like Dr. Villalobos's thoughts on this trip?     2) update on her sleep study -- Has not been able to get this scheduled yet. Still trying.     3) Had CT chest on 8/6/21.  Would like to find out results.

## 2021-08-16 NOTE — PROGRESS NOTES
She is a 54 y.o. y/o female patient who comes to the sleep medicine clinic for PAP therapy follow up.    [facility] [facility] [place]    She was diagnosed with severe OSAH (AHI=56.4) and has been using nPAP therapy.  Initial PAP settings: VPAP in 11 / 5 cmH2O Spontaneous mode (Ti 0.3-2 s; T &C= medium) with oxygen supplementation 1 L/min.  At 5/2019 visit PAP settings adjusted to: VPAP Auto 5 - 16 with PS 6 with O2 at 1 - 2 LPM bled in at CPAP  Current PAP settings: same    Her symptoms are improved since she started using CPAP. She feels more refreshed when she wakes up.    She denies any PAP intolerance. She is using the device nightly and tolerates the pressure much more since adjustments at last visit.     30-Days Efficacy and Compliance Data  Residual AHI: 2.4/hr  Leak: 30.9 LPM (95%)  Days used > 4 hours: 100% (30/30)  Average usage per night: 11:41 hours  95th percentile P: 13.5/7.5 cmH2O  Median VT: 431 mL  Median RR: 19 bpm  Median MV: 8.2 LPM  Percentage or Cycled Breaths: 98%  Percentage of Spontaneously Triggered breaths: -    Mask Tolerance: Fine  Skin irritation: None    Physical Exam:  /73   Pulse 75   Ht 5' (1.524 m)   Wt (!) 357 lb (161.9 kg)   SpO2 96%   BMI 69.72 kg/m    General appearance: No apparent distress, well groomed.  Head: Normocephalic, atraumatic.  Musculoskeletal: No edema noted.  No clubbing or cyanosis.  Skin: Warm, dry, intact.  Neurologic: Alert and oriented to person, place and time   Gait is normal.  Psychiatric: Affect pleasant.  Mood good.     Labs/Studies:  I reviewed the efficacy and compliance report from her device. Data summarized on the HPI.     Assessment and Plan:  1. Severe Obstructive Sleep Apnea.  Residual AHI is great  Compliance is perfect  Patient is benefiting from using PAP therapy.    Continue with P = 5 - 16 PS 6 cmH2O.    We counseled the patient on the importance of using her PAP device every night and the risks of not treating sleep apnea.       Patient verbalized understanding of these issues, agrees with the plan and all questions were answered today. Patient was given an opportuntity to voice any other symptoms or concerns not listed above. Patient did not have any other symptoms or concerns.      Patient told to return in 12 months.     Deniz KUMAR Board Certified in Internal Medicine and Sleep Medicine  [facility].    We spent a total of 15 minutes of face-to-face encounter and more than 50% of the encounter was used for counseling or coordination of care.        Yes

## 2021-08-18 DIAGNOSIS — I10 ESSENTIAL HYPERTENSION, BENIGN: ICD-10-CM

## 2021-08-18 DIAGNOSIS — E11.9 TYPE 2 DIABETES MELLITUS WITHOUT COMPLICATION, WITHOUT LONG-TERM CURRENT USE OF INSULIN (H): ICD-10-CM

## 2021-08-19 ENCOUNTER — MYC MEDICAL ADVICE (OUTPATIENT)
Dept: FAMILY MEDICINE | Facility: CLINIC | Age: 56
End: 2021-08-19

## 2021-08-19 RX ORDER — LOSARTAN POTASSIUM 50 MG/1
50 TABLET ORAL DAILY
Qty: 90 TABLET | Refills: 0 | COMMUNITY
Start: 2021-08-19

## 2021-08-19 NOTE — TELEPHONE ENCOUNTER
Received refill request for   Pending Prescriptions:                       Disp   Refills    losartan (COZAAR) 50 MG tablet [Pharmacy *90 tab*0            Sig: Take 1 tablet (50 mg) by mouth daily HOLD if           systolic Blood pressure is under 115    Last seen and refilled 3/10/2021 - advised a fasting 6 month follow up. Denied and mychart message sent.

## 2021-08-29 ENCOUNTER — MEDICAL CORRESPONDENCE (OUTPATIENT)
Dept: HEALTH INFORMATION MANAGEMENT | Facility: CLINIC | Age: 56
End: 2021-08-29

## 2021-08-30 ENCOUNTER — MEDICAL CORRESPONDENCE (OUTPATIENT)
Dept: HEALTH INFORMATION MANAGEMENT | Facility: CLINIC | Age: 56
End: 2021-08-30

## 2021-08-30 ENCOUNTER — DOCUMENTATION ONLY (OUTPATIENT)
Dept: PULMONOLOGY | Facility: OTHER | Age: 56
End: 2021-08-30

## 2021-08-30 NOTE — PROGRESS NOTES
Orders sent to [facility] for a new O2 concentrator. Hers broke and she needs a new one. Sent to [phone].

## 2021-09-07 DIAGNOSIS — E11.9 TYPE 2 DIABETES MELLITUS WITHOUT COMPLICATION, WITHOUT LONG-TERM CURRENT USE OF INSULIN (H): ICD-10-CM

## 2021-09-07 DIAGNOSIS — I10 ESSENTIAL HYPERTENSION, BENIGN: ICD-10-CM

## 2021-09-07 RX ORDER — LOSARTAN POTASSIUM 50 MG/1
50 TABLET ORAL DAILY
Qty: 7 TABLET | Refills: 0 | Status: SHIPPED | OUTPATIENT
Start: 2021-09-07 | End: 2021-09-09

## 2021-09-07 NOTE — TELEPHONE ENCOUNTER
Can you review for SERINA Decker is requesting a refill of:    Pending Prescriptions:                       Disp   Refills    losartan (COZAAR) 50 MG tablet [Pharmacy *7 tabl*0            Sig: Take 1 tablet (50 mg) by mouth daily HOLD if           systolic Blood pressure is under 115    Please close encounter if RX was sent. Thanks, Barbara    Pt has been out for 2 days. Has OV on 9/9/21

## 2021-09-09 ENCOUNTER — OFFICE VISIT (OUTPATIENT)
Dept: FAMILY MEDICINE | Facility: CLINIC | Age: 56
End: 2021-09-09

## 2021-09-09 VITALS
TEMPERATURE: 98.8 F | BODY MASS INDEX: 59.07 KG/M2 | DIASTOLIC BLOOD PRESSURE: 84 MMHG | SYSTOLIC BLOOD PRESSURE: 122 MMHG | HEART RATE: 78 BPM | OXYGEN SATURATION: 97 % | RESPIRATION RATE: 20 BRPM | WEIGHT: 293 LBS | HEIGHT: 59 IN

## 2021-09-09 DIAGNOSIS — N18.31 STAGE 3A CHRONIC KIDNEY DISEASE (H): ICD-10-CM

## 2021-09-09 DIAGNOSIS — I10 ESSENTIAL HYPERTENSION, BENIGN: ICD-10-CM

## 2021-09-09 DIAGNOSIS — E11.8 DIABETES MELLITUS TYPE 2 WITH COMPLICATIONS (H): Primary | ICD-10-CM

## 2021-09-09 DIAGNOSIS — Z79.899 ENCOUNTER FOR LONG-TERM (CURRENT) USE OF MEDICATIONS: ICD-10-CM

## 2021-09-09 DIAGNOSIS — K21.9 GASTROESOPHAGEAL REFLUX DISEASE WITHOUT ESOPHAGITIS: ICD-10-CM

## 2021-09-09 DIAGNOSIS — E66.01 MORBID OBESITY WITH BMI OF 60.0-69.9, ADULT (H): ICD-10-CM

## 2021-09-09 DIAGNOSIS — B35.4 TINEA CORPORIS: ICD-10-CM

## 2021-09-09 PROBLEM — N18.30 CHRONIC KIDNEY DISEASE, STAGE 3 (H): Status: ACTIVE | Noted: 2021-09-09

## 2021-09-09 PROBLEM — U07.1 2019 NOVEL CORONAVIRUS DISEASE (COVID-19): Status: ACTIVE | Noted: 2020-11-26

## 2021-09-09 LAB
ALBUMIN SERPL-MCNC: 3.9 G/DL (ref 3.6–5.1)
ALBUMIN/GLOB SERPL: 1 {RATIO} (ref 1–2.5)
ALP SERPL-CCNC: 67 U/L (ref 33–130)
ALT 1742-6: 18 U/L (ref 0–32)
AST 1920-8: 24 U/L (ref 0–35)
BILIRUB SERPL-MCNC: 0.7 MG/DL (ref 0.2–1.2)
BUN SERPL-MCNC: 14 MG/DL (ref 7–25)
BUN/CREATININE RATIO: 13.3 (ref 6–22)
CALCIUM SERPL-MCNC: 9.5 MG/DL (ref 8.6–10.3)
CHLORIDE SERPLBLD-SCNC: 96.1 MMOL/L (ref 98–110)
CHOLEST SERPL-MCNC: 246 MG/DL (ref 0–199)
CHOLEST/HDLC SERPL: 5 {RATIO} (ref 0–5)
CO2 SERPL-SCNC: 34.2 MMOL/L (ref 20–32)
CREAT SERPL-MCNC: 1.05 MG/DL (ref 0.6–1.3)
GLOBULIN, CALCULATED - QUEST: 4 (ref 1.9–3.7)
GLUCOSE SERPL-MCNC: 117 MG/DL (ref 60–99)
HBA1C MFR BLD: 6.7 % (ref 4–7)
HDLC SERPL-MCNC: 52 MG/DL (ref 40–150)
LDLC SERPL CALC-MCNC: 127 MG/DL (ref 0–130)
POTASSIUM SERPL-SCNC: 3.53 MMOL/L (ref 3.5–5.3)
PROT SERPL-MCNC: 7.9 G/DL (ref 6.1–8.1)
SODIUM SERPL-SCNC: 141.6 MMOL/L (ref 135–146)
TRIGL SERPL-MCNC: 334 MG/DL (ref 0–149)

## 2021-09-09 PROCEDURE — 99215 OFFICE O/P EST HI 40 MIN: CPT | Performed by: FAMILY MEDICINE

## 2021-09-09 PROCEDURE — 83036 HEMOGLOBIN GLYCOSYLATED A1C: CPT | Performed by: FAMILY MEDICINE

## 2021-09-09 PROCEDURE — 80053 COMPREHEN METABOLIC PANEL: CPT | Performed by: FAMILY MEDICINE

## 2021-09-09 PROCEDURE — 36415 COLL VENOUS BLD VENIPUNCTURE: CPT | Performed by: FAMILY MEDICINE

## 2021-09-09 PROCEDURE — 80061 LIPID PANEL: CPT | Performed by: FAMILY MEDICINE

## 2021-09-09 RX ORDER — METFORMIN HCL 500 MG
1000 TABLET, EXTENDED RELEASE 24 HR ORAL 2 TIMES DAILY WITH MEALS
Qty: 360 TABLET | Refills: 1 | Status: SHIPPED | OUTPATIENT
Start: 2021-09-09 | End: 2022-03-23

## 2021-09-09 RX ORDER — LOSARTAN POTASSIUM 50 MG/1
50 TABLET ORAL DAILY
Qty: 7 TABLET | Refills: 0 | Status: SHIPPED | OUTPATIENT
Start: 2021-09-09 | End: 2021-09-20

## 2021-09-09 RX ORDER — BENZONATATE 100 MG/1
CAPSULE, LIQUID FILLED ORAL
COMMUNITY
Start: 2021-08-05 | End: 2022-09-30

## 2021-09-09 RX ORDER — NYSTATIN 100000 [USP'U]/G
POWDER TOPICAL 2 TIMES DAILY PRN
Qty: 60 G | Refills: 1 | Status: SHIPPED | OUTPATIENT
Start: 2021-09-09 | End: 2022-03-23

## 2021-09-09 ASSESSMENT — PATIENT HEALTH QUESTIONNAIRE - PHQ9
SUM OF ALL RESPONSES TO PHQ QUESTIONS 1-9: 1
5. POOR APPETITE OR OVEREATING: NOT AT ALL

## 2021-09-09 ASSESSMENT — ANXIETY QUESTIONNAIRES
GAD7 TOTAL SCORE: 2
1. FEELING NERVOUS, ANXIOUS, OR ON EDGE: SEVERAL DAYS
3. WORRYING TOO MUCH ABOUT DIFFERENT THINGS: NOT AT ALL
IF YOU CHECKED OFF ANY PROBLEMS ON THIS QUESTIONNAIRE, HOW DIFFICULT HAVE THESE PROBLEMS MADE IT FOR YOU TO DO YOUR WORK, TAKE CARE OF THINGS AT HOME, OR GET ALONG WITH OTHER PEOPLE: NOT DIFFICULT AT ALL
7. FEELING AFRAID AS IF SOMETHING AWFUL MIGHT HAPPEN: NOT AT ALL
6. BECOMING EASILY ANNOYED OR IRRITABLE: SEVERAL DAYS
5. BEING SO RESTLESS THAT IT IS HARD TO SIT STILL: NOT AT ALL
2. NOT BEING ABLE TO STOP OR CONTROL WORRYING: NOT AT ALL

## 2021-09-09 ASSESSMENT — MIFFLIN-ST. JEOR: SCORE: 1915.37

## 2021-09-09 NOTE — PATIENT INSTRUCTIONS
Await labs    Reviewed concepts of diabetes self-management stressing the primary   role of the patient in monitoring and maintaining control of   diabetes.      1)  Medication: continue current medication regimen unchanged  2)  Dietary sodium restriction  3)  Regular aerobic exercise/weight loss  4)  Recheck in 6 months fasting, sooner should new symptoms or   problems arise.    Patient Education: Reviewed risks of hypertension and principles of   treatment.    Gentle skin care/ use powder prn  Back to dermatology specialists fo immunization update

## 2021-09-09 NOTE — PROGRESS NOTES
SUBJECTIVE:  Mirta Decker is an 56 year old female who presents for evaluation and treatment   of Type 2 diabetes mellitus, hypertension, . Age at diagnosis 55. Family history   positive for diabetes in the patient s father and paternal grandmother.   Previous treatment modalities employed include diet, oral agents and exercise.   Current treatment includes diet, oral agents and exercise.     Current monitoring regimen: home blood tests - once daily  Home blood sugar records: 100-130  Last HgbA1c: 6.7  Diabetic complications: nephropathy and cardiovascular disease  Cardiovascular risk factors: family history, diabetes mellitus, hypertension, obesity, sedentary life style, stress, COPD and recent Covid 19    Current Outpatient Medications   Medication     acetaminophen (TYLENOL) 325 MG tablet     albuterol (PROAIR HFA) 108 (90 Base) MCG/ACT inhaler     apremilast (OTEZLA) 30 MG tablet     ascorbic acid (EQL VITAMIN C) 500 MG TABS     blood glucose (ACCU-CHEK GUIDE) test strip     blood glucose monitoring (ACCU-CHEK FASTCLIX) lancets     budesonide (PULMICORT) 0.5 MG/2ML neb solution     calcipotriene (DOVONOX) 0.005 % external cream     cetirizine (ZYRTEC) 10 MG tablet     famotidine (PEPCID) 20 MG tablet     ferrous sulfate (FEROSUL) 325 (65 Fe) MG tablet     fluticasone (FLONASE) 50 MCG/ACT nasal spray     folic acid (FOLVITE) 1 MG tablet     ipratropium - albuterol 0.5 mg/2.5 mg/3 mL (DUONEB) 0.5-2.5 (3) MG/3ML neb solution     losartan (COZAAR) 50 MG tablet     magnesium oxide (MAG-OX) 400 MG tablet     metFORMIN (GLUCOPHAGE-XR) 500 MG 24 hr tablet     metolazone (ZAROXOLYN) 2.5 MG tablet     Multiple Vitamins-Iron (TAB-A-LYDIA/IRON/BETA CAROTENE) TABS     Multiple Vitamins-Minerals (CENTROVITE) TABS     nystatin (MYCOSTATIN) 019604 UNIT/GM external powder     ondansetron (ZOFRAN) 4 MG tablet     order for DME     polyethylene glycol (MIRALAX/GLYCOLAX) Packet     senna (SM SENNA LAXATIVE) 8.6 MG tablet      "sertraline (ZOLOFT) 100 MG tablet     torsemide (DEMADEX) 20 MG tablet     No current facility-administered medications for this visit.     Allergies   Allergen Reactions     Lisinopril      Penicillin G      Penicillins Hives       Social History     Tobacco Use     Smoking status: Never Smoker     Smokeless tobacco: Never Used   Substance Use Topics     Alcohol use: Yes     Alcohol/week: 0.8 standard drinks     Types: 1 Standard drinks or equivalent per week     Comment: occasional       Review Of Systems  Skin: psoriasis  Eyes: negative  Ears/Nose/Throat: allergies  Respiratory: asthma with pulmonary consult in control  Cardiovascular: hypertension  Gastrointestinal: dysphagia, heartburn and constipation  Genitourinary: negative  Musculoskeletal: negative  Neurologic: negative  Psychiatric: depression stable  Hematologic/Lymphatic/Immunologic: negative  Endocrine: diabetes    OBJECTIVE:  /84 (BP Location: Right arm, Patient Position: Sitting, Cuff Size: Adult Large)   Pulse 78   Temp 98.8  F (37.1  C) (Temporal)   Ht 1.499 m (4' 11\")   Wt 143 kg (315 lb 3.2 oz)   SpO2 97%   BMI 63.66 kg/m    General appearance: healthy, alert, no distress, cooperative, smiling and over weight  Skin: positives: psoriatic plaques on all extremities  Eyes: conjunctivae/corneas clear. PERRL, EOM's intact. Fundi benign  Ears: negative  Oropharynx: Lips, mucosa, and tongue normal. Teeth and gums normal.  Neck: Neck supple. No adenopathy. Thyroid symmetric, normal size,, Carotids without bruits.  Lungs: negative, Percussion normal. Good diaphragmatic excursion. Lungs clear  Heart: negative, PMI normal. No lifts, heaves, or thrills. RRR. No murmurs, clicks gallops or rub  Abdomen: Abdomen soft, non-tender. BS normal. No masses, organomegaly, positive findings: obese  Extremities: Extremities normal. No deformities, edema, or skin discoloration.  Peripheral pulses: radial=4/4, femoral=4/4, popliteal=4/4, dorsalis " pedis=4/4,  Neuro: Gait normal. Reflexes normal and symmetric. Sensation grossly WNL.    ASSESSMENT:(E11.8) 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)  (primary encounter diagnosis)  Comment: Reviewed concepts of diabetes self-management stressing the primary   role of the patient in monitoring and maintaining control of   diabetes.      Plan: HEMOGLOBIN A1C (BFP), VENOUS COLLECTION, MS         FOOT EXAM NO CHARGE, Lipid Panel (BFP),         Comprehensive Metobolic Panel (BFP), losartan         (COZAAR) 50 MG tablet, metFORMIN         (GLUCOPHAGE-XR) 500 MG 24 hr tablet        Recheck 6 months fasting    (N18.31) Stage 3a chronic kidney disease  Plan: VENOUS COLLECTION, Comprehensive Metobolic         Panel (BFP)        await labs    (I10) Essential hypertension, benign  Plan: VENOUS COLLECTION, Comprehensive Metobolic         Panel (BFP), losartan (COZAAR) 50 MG tablet        1)  Medication: continue current medication regimen unchanged  2)  Dietary sodium restriction  3)  Regular aerobic exercise  4)  Recheck in 6 months, sooner should new symptoms or   problems arise.    Patient Education: Reviewed risks of hypertension and principles of   treatment.        (K21.9) Gastroesophageal reflux disease without esophagitis  Plan: I have reviewed the patient's medical history in detail and updated the computerized patient record.      (B35.4) Tinea corporis  Comment: gentle skin care  Plan: nystatin (MYCOSTATIN) 146515 UNIT/GM external         powder        Prn use    (Z79.899) Encounter for long-term (current) use of medications  Plan: HEMOGLOBIN A1C (BFP), VENOUS COLLECTION, Lipid         Panel (BFP), Comprehensive Metobolic Panel         (BFP)            Total time spent with patient today including visit and non face to face time 40 minutes.

## 2021-09-09 NOTE — NURSING NOTE
Chief Complaint   Patient presents with     Recheck Medication     fasting today, A1C, refill medications     Pre-visit Screening:  Immunizations:  up to date  Colonoscopy:  is up to date  Mammogram: is up to date  Asthma Action Test/Plan:  NA  PHQ9:  Done today  GAD7:  Done today  Questioned patient about current smoking habits Pt. has never smoked.  Ok to leave detailed message on voice mail for today's visit only Yes, phone # [phone]

## 2021-09-10 ASSESSMENT — ANXIETY QUESTIONNAIRES: GAD7 TOTAL SCORE: 2

## 2021-09-19 ENCOUNTER — MYC MEDICAL ADVICE (OUTPATIENT)
Dept: FAMILY MEDICINE | Facility: CLINIC | Age: 56
End: 2021-09-19

## 2021-09-19 DIAGNOSIS — E11.8 DIABETES MELLITUS TYPE 2 WITH COMPLICATIONS (H): ICD-10-CM

## 2021-09-19 DIAGNOSIS — I10 ESSENTIAL HYPERTENSION, BENIGN: ICD-10-CM

## 2021-09-20 RX ORDER — LOSARTAN POTASSIUM 50 MG/1
50 TABLET ORAL DAILY
Qty: 90 TABLET | Refills: 1 | Status: SHIPPED | OUTPATIENT
Start: 2021-09-20 | End: 2022-03-23

## 2021-10-06 ENCOUNTER — OFFICE VISIT (OUTPATIENT)
Dept: PULMONOLOGY | Facility: OTHER | Age: 56
End: 2021-10-06
Payer: COMMERCIAL

## 2021-10-06 ENCOUNTER — MEDICAL CORRESPONDENCE (OUTPATIENT)
Dept: HEALTH INFORMATION MANAGEMENT | Facility: CLINIC | Age: 56
End: 2021-10-06

## 2021-10-06 VITALS
OXYGEN SATURATION: 98 % | WEIGHT: 293 LBS | DIASTOLIC BLOOD PRESSURE: 80 MMHG | SYSTOLIC BLOOD PRESSURE: 132 MMHG | BODY MASS INDEX: 63.06 KG/M2 | HEART RATE: 96 BPM

## 2021-10-06 DIAGNOSIS — E66.2 OBESITY HYPOVENTILATION SYNDROME (H): ICD-10-CM

## 2021-10-06 DIAGNOSIS — I89.0 LYMPH EDEMA: ICD-10-CM

## 2021-10-06 DIAGNOSIS — Z23 ENCOUNTER FOR IMMUNIZATION: Primary | ICD-10-CM

## 2021-10-06 DIAGNOSIS — G47.33 OSA (OBSTRUCTIVE SLEEP APNEA): ICD-10-CM

## 2021-10-06 DIAGNOSIS — L40.50 PSORIATIC ARTHROPATHY (H): ICD-10-CM

## 2021-10-06 DIAGNOSIS — E66.01 MORBID OBESITY WITH BMI OF 60.0-69.9, ADULT (H): ICD-10-CM

## 2021-10-06 PROCEDURE — 99214 OFFICE O/P EST MOD 30 MIN: CPT | Mod: 25 | Performed by: INTERNAL MEDICINE

## 2021-10-06 PROCEDURE — 90686 IIV4 VACC NO PRSV 0.5 ML IM: CPT | Performed by: INTERNAL MEDICINE

## 2021-10-06 PROCEDURE — 90471 IMMUNIZATION ADMIN: CPT | Performed by: INTERNAL MEDICINE

## 2021-10-06 NOTE — NURSING NOTE
Oxygen saturation walk test    Patient oxygen saturation on RA at rest is 88%.    Resting oxygen saturations on 3L pulse dose is 95%.    Oxygen pulse dose testing  While ambulating 75ft on 3LPM at pulse dose, oxygen saturation is 92%.       DME Provider: Philip Resp.    Patient is ambulatory within his/her home.
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
## 2021-10-12 PROBLEM — E66.2 OBESITY HYPOVENTILATION SYNDROME (H): Status: ACTIVE | Noted: 2021-10-12

## 2021-10-13 ENCOUNTER — DOCUMENTATION ONLY (OUTPATIENT)
Dept: PULMONOLOGY | Facility: OTHER | Age: 56
End: 2021-10-13

## 2021-10-25 ENCOUNTER — MEDICAL CORRESPONDENCE (OUTPATIENT)
Dept: HEALTH INFORMATION MANAGEMENT | Facility: CLINIC | Age: 56
End: 2021-10-25
Payer: COMMERCIAL

## 2021-12-18 ENCOUNTER — HEALTH MAINTENANCE LETTER (OUTPATIENT)
Age: 56
End: 2021-12-18

## 2022-02-02 ENCOUNTER — TELEPHONE (OUTPATIENT)
Dept: FAMILY MEDICINE | Facility: CLINIC | Age: 57
End: 2022-02-02

## 2022-02-12 ENCOUNTER — HEALTH MAINTENANCE LETTER (OUTPATIENT)
Age: 57
End: 2022-02-12

## 2022-03-23 ENCOUNTER — OFFICE VISIT (OUTPATIENT)
Dept: FAMILY MEDICINE | Facility: CLINIC | Age: 57
End: 2022-03-23

## 2022-03-23 VITALS
SYSTOLIC BLOOD PRESSURE: 140 MMHG | HEART RATE: 91 BPM | TEMPERATURE: 98 F | OXYGEN SATURATION: 98 % | RESPIRATION RATE: 26 BRPM | WEIGHT: 293 LBS | BODY MASS INDEX: 64.43 KG/M2 | DIASTOLIC BLOOD PRESSURE: 86 MMHG

## 2022-03-23 DIAGNOSIS — K59.03 DRUG-INDUCED CONSTIPATION: ICD-10-CM

## 2022-03-23 DIAGNOSIS — B35.4 TINEA CORPORIS: ICD-10-CM

## 2022-03-23 DIAGNOSIS — I10 ESSENTIAL HYPERTENSION, BENIGN: ICD-10-CM

## 2022-03-23 DIAGNOSIS — Z99.81 OXYGEN DEPENDENT: ICD-10-CM

## 2022-03-23 DIAGNOSIS — E11.8 DIABETES MELLITUS TYPE 2 WITH COMPLICATIONS (H): ICD-10-CM

## 2022-03-23 DIAGNOSIS — J30.1 CHRONIC SEASONAL ALLERGIC RHINITIS DUE TO POLLEN: ICD-10-CM

## 2022-03-23 DIAGNOSIS — F32.5 MAJOR DEPRESSIVE DISORDER WITH SINGLE EPISODE, IN FULL REMISSION (H): ICD-10-CM

## 2022-03-23 DIAGNOSIS — Z99.89 BIPAP (BIPHASIC POSITIVE AIRWAY PRESSURE) DEPENDENCE: Primary | ICD-10-CM

## 2022-03-23 DIAGNOSIS — E66.01 MORBID OBESITY WITH BMI OF 60.0-69.9, ADULT (H): ICD-10-CM

## 2022-03-23 DIAGNOSIS — L40.9 PSORIASIS: ICD-10-CM

## 2022-03-23 DIAGNOSIS — D50.0 IRON DEFICIENCY ANEMIA DUE TO CHRONIC BLOOD LOSS: ICD-10-CM

## 2022-03-23 DIAGNOSIS — K21.9 GASTROESOPHAGEAL REFLUX DISEASE WITHOUT ESOPHAGITIS: ICD-10-CM

## 2022-03-23 PROBLEM — B34.9 VIRAL SYNDROME: Status: RESOLVED | Noted: 2020-03-08 | Resolved: 2022-03-23

## 2022-03-23 PROBLEM — R09.02 HYPOXIA: Status: RESOLVED | Noted: 2018-07-02 | Resolved: 2022-03-23
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
## 2022-03-23 LAB
ALBUMIN (URINE) MG/L: 10
ALBUMIN URINE MG/G CR: ABNORMAL MG/G CREATININE
CREATININE URINE MG/DL: 10 MG/DL
HBA1C MFR BLD: 6.4 % (ref 4–7)

## 2022-03-23 PROCEDURE — 99214 OFFICE O/P EST MOD 30 MIN: CPT | Performed by: FAMILY MEDICINE

## 2022-03-23 PROCEDURE — 36415 COLL VENOUS BLD VENIPUNCTURE: CPT | Performed by: FAMILY MEDICINE

## 2022-03-23 PROCEDURE — 83036 HEMOGLOBIN GLYCOSYLATED A1C: CPT | Performed by: FAMILY MEDICINE

## 2022-03-23 PROCEDURE — 82043 UR ALBUMIN QUANTITATIVE: CPT | Performed by: FAMILY MEDICINE

## 2022-03-23 PROCEDURE — 82570 ASSAY OF URINE CREATININE: CPT | Performed by: FAMILY MEDICINE

## 2022-03-23 RX ORDER — CETIRIZINE HYDROCHLORIDE 10 MG/1
TABLET ORAL
Qty: 90 TABLET | Refills: 1 | Status: SHIPPED | OUTPATIENT
Start: 2022-03-23 | End: 2022-09-30

## 2022-03-23 RX ORDER — FAMOTIDINE 20 MG/1
20 TABLET, FILM COATED ORAL 2 TIMES DAILY
Qty: 180 TABLET | Refills: 1 | Status: SHIPPED | OUTPATIENT
Start: 2022-03-23 | End: 2022-09-30

## 2022-03-23 RX ORDER — METFORMIN HCL 500 MG
1000 TABLET, EXTENDED RELEASE 24 HR ORAL 2 TIMES DAILY WITH MEALS
Qty: 360 TABLET | Refills: 1 | Status: SHIPPED | OUTPATIENT
Start: 2022-03-23 | End: 2022-09-30

## 2022-03-23 RX ORDER — LOSARTAN POTASSIUM 50 MG/1
50 TABLET ORAL DAILY
Qty: 90 TABLET | Refills: 1 | Status: SHIPPED | OUTPATIENT
Start: 2022-03-23 | End: 2022-09-30

## 2022-03-23 RX ORDER — FERROUS SULFATE 325(65) MG
TABLET ORAL
Qty: 90 TABLET | Refills: 3 | Status: SHIPPED | OUTPATIENT
Start: 2022-03-23 | End: 2023-11-03

## 2022-03-23 RX ORDER — FLUTICASONE PROPIONATE 50 MCG
1 SPRAY, SUSPENSION (ML) NASAL DAILY
Qty: 16 G | Refills: 11 | Status: SHIPPED | OUTPATIENT
Start: 2022-03-23 | End: 2023-11-03

## 2022-03-23 RX ORDER — TORSEMIDE 20 MG/1
TABLET ORAL
COMMUNITY
Start: 2022-03-20 | End: 2022-04-22

## 2022-03-23 RX ORDER — SENNOSIDES A AND B 8.6 MG/1
1 TABLET, FILM COATED ORAL DAILY
Qty: 90 TABLET | Refills: 3 | Status: SHIPPED | OUTPATIENT
Start: 2022-03-23 | End: 2024-05-07

## 2022-03-23 RX ORDER — NYSTATIN 100000 [USP'U]/G
POWDER TOPICAL 2 TIMES DAILY PRN
Qty: 60 G | Refills: 1 | Status: SHIPPED | OUTPATIENT
Start: 2022-03-23 | End: 2022-09-30

## 2022-03-23 RX ORDER — SERTRALINE HYDROCHLORIDE 100 MG/1
TABLET, FILM COATED ORAL
Qty: 90 TABLET | Refills: 1 | Status: SHIPPED | OUTPATIENT
Start: 2022-03-23 | End: 2022-09-30

## 2022-03-23 ASSESSMENT — ANXIETY QUESTIONNAIRES
1. FEELING NERVOUS, ANXIOUS, OR ON EDGE: SEVERAL DAYS
6. BECOMING EASILY ANNOYED OR IRRITABLE: MORE THAN HALF THE DAYS
IF YOU CHECKED OFF ANY PROBLEMS ON THIS QUESTIONNAIRE, HOW DIFFICULT HAVE THESE PROBLEMS MADE IT FOR YOU TO DO YOUR WORK, TAKE CARE OF THINGS AT HOME, OR GET ALONG WITH OTHER PEOPLE: NOT DIFFICULT AT ALL
2. NOT BEING ABLE TO STOP OR CONTROL WORRYING: NOT AT ALL
GAD7 TOTAL SCORE: 4
7. FEELING AFRAID AS IF SOMETHING AWFUL MIGHT HAPPEN: SEVERAL DAYS
3. WORRYING TOO MUCH ABOUT DIFFERENT THINGS: NOT AT ALL
5. BEING SO RESTLESS THAT IT IS HARD TO SIT STILL: NOT AT ALL

## 2022-03-23 ASSESSMENT — PATIENT HEALTH QUESTIONNAIRE - PHQ9
5. POOR APPETITE OR OVEREATING: NOT AT ALL
SUM OF ALL RESPONSES TO PHQ QUESTIONS 1-9: 4

## 2022-03-23 ASSESSMENT — ASTHMA QUESTIONNAIRES: ACT_TOTALSCORE: 19

## 2022-03-23 NOTE — NURSING NOTE
Chief Complaint   Patient presents with     Recheck Medication     non fasting medication check and review     Establish Care     wants to establish care with Dr Garcia now that Dr. Martin is retired       Pre-visit Screening:  Immunizations:  not up to date - due for tetanus   Colonoscopy:  is up to date  Mammogram: is due but patient declines  Asthma Action Test/Plan:  ACT given today   PHQ9:  Given today   GAD7:  Given today   Questioned patient about current smoking habits Pt. has never smoked.  Ok to leave detailed message on voice mail for today's visit only Yes, phone # [phone]

## 2022-03-23 NOTE — PROGRESS NOTES
Assessment & Plan   Problem List Items Addressed This Visit        Respiratory    BiPAP (biphasic positive airway pressure) dependence--followed by pulmonology - Primary       Digestive    Morbid obesity with BMI of 60.0-69.9, adult (H)    Relevant Medications    metFORMIN (GLUCOPHAGE-XR) 500 MG 24 hr tablet       Circulatory    Essential hypertension, benign    Relevant Medications    losartan (COZAAR) 50 MG tablet       Hematologic    Anemia    Relevant Medications    famotidine (PEPCID) 20 MG tablet    ferrous sulfate (FEROSUL) 325 (65 Fe) MG tablet       Behavioral    Depression    Relevant Medications    sertraline (ZOLOFT) 100 MG tablet      Other Visit Diagnoses     Chronic seasonal allergic rhinitis due to pollen        Relevant Medications    cetirizine (ZYRTEC) 10 MG tablet    fluticasone (FLONASE) 50 MCG/ACT nasal spray    Gastroesophageal reflux disease without esophagitis        Relevant Medications    famotidine (PEPCID) 20 MG tablet    ferrous sulfate (FEROSUL) 325 (65 Fe) MG tablet    senna (SM SENNA LAXATIVE) 8.6 MG tablet    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)        Relevant Medications    losartan (COZAAR) 50 MG tablet    metFORMIN (GLUCOPHAGE-XR) 500 MG 24 hr tablet    Other Relevant Orders    HEMOGLOBIN A1C (BFP) (Completed)    VENOUS COLLECTION (Completed)    ALBUMIN RANDOM URINE QUANTITATIVE (BFP)    Tinea corporis        Relevant Medications    cetirizine (ZYRTEC) 10 MG tablet    fluticasone (FLONASE) 50 MCG/ACT nasal spray    nystatin (MYCOSTATIN) 296782 UNIT/GM external powder    Drug-induced constipation        Relevant Medications    senna (SM SENNA LAXATIVE) 8.6 MG tablet         1. Chronic seasonal allergic rhinitis due to pollen  Refilled medications, controls her symptoms.  - cetirizine (ZYRTEC) 10 MG tablet; TAKE 1 TABLET BY MOUTH ONE TIME DAILY  Dispense: 90 tablet; Refill: 1  - fluticasone (FLONASE) 50 MCG/ACT nasal spray; Spray 1 spray into both nostrils daily   Dispense: 16 g; Refill: 11    2. Gastroesophageal reflux disease without esophagitis  Refilled.  - famotidine (PEPCID) 20 MG tablet; Take 1 tablet (20 mg) by mouth 2 times daily  Dispense: 180 tablet; Refill: 1  - ferrous sulfate (FEROSUL) 325 (65 Fe) MG tablet; Take 1 tablet (325 mg) by mouth daily  Dispense: 90 tablet; Refill: 3    3. Iron deficiency anemia due to chronic blood loss  Refilled.  - famotidine (PEPCID) 20 MG tablet; Take 1 tablet (20 mg) by mouth 2 times daily  Dispense: 180 tablet; Refill: 1  - ferrous sulfate (FEROSUL) 325 (65 Fe) MG tablet; Take 1 tablet (325 mg) by mouth daily  Dispense: 90 tablet; Refill: 3    4. 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)  Controlled, refilled medications. Recheck in 6 months.  - losartan (COZAAR) 50 MG tablet; Take 1 tablet (50 mg) by mouth daily HOLD if systolic Blood pressure is under 115  Dispense: 90 tablet; Refill: 1  - metFORMIN (GLUCOPHAGE-XR) 500 MG 24 hr tablet; Take 2 tablets (1,000 mg) by mouth 2 times daily (with meals)  Dispense: 360 tablet; Refill: 1  - HEMOGLOBIN A1C (BFP)  - VENOUS COLLECTION  - ALBUMIN RANDOM URINE QUANTITATIVE (BFP)    5. Essential hypertension, benign  High today. She will watch at home, goal is less than ;130/80 if higher, she should contact me. For now, continue this same dose.  - losartan (COZAAR) 50 MG tablet; Take 1 tablet (50 mg) by mouth daily HOLD if systolic Blood pressure is under 115  Dispense: 90 tablet; Refill: 1    6. Morbid obesity with BMI of 60.0-69.9, adult (H)    - metFORMIN (GLUCOPHAGE-XR) 500 MG 24 hr tablet; Take 2 tablets (1,000 mg) by mouth 2 times daily (with meals)  Dispense: 360 tablet; Refill: 1    7. Tinea corporis    - nystatin (MYCOSTATIN) 321879 UNIT/GM external powder; Apply topically 2 times daily as needed for other (fungal) , under breasts  Dispense: 60 g; Refill: 1    8. Major depressive disorder with single episode, in full remission (H)  Controlled on medications, refilled.  -  "sertraline (ZOLOFT) 100 MG tablet; TAKE 1 TABLET BY MOUTH ONE TIME DAILY  Dispense: 90 tablet; Refill: 1    9. Drug-induced constipation    - senna (SM SENNA LAXATIVE) 8.6 MG tablet; Take 1 tablet by mouth daily  Dispense: 90 tablet; Refill: 3    10. BiPAP (biphasic positive airway pressure) dependence      11. Oxygen dependent  Followed by pulmonary           BMI:   Estimated body mass index is 64.43 kg/m  as calculated from the following:    Height as of 9/9/21: 1.499 m (4' 11\").    Weight as of this encounter: 144.7 kg (319 lb).         FUTURE APPOINTMENTS:       - Follow-up visit in 6 months.    No follow-ups on file.    Noelle Garcia MD  [facility]    Subjective     Nursing Notes:   [facility] R[facility]  3/23/2022  2:29 PM  Signed  Chief Complaint   Patient presents with     Recheck Medication     non fasting medication check and review     Establish Care     wants to establish care with Dr Garcia now that Dr. Martin is retired       Pre-visit Screening:  Immunizations:  not up to date - due for tetanus   Colonoscopy:  is up to date  Mammogram: is due but patient declines  Asthma Action Test/Plan:  ACT given today   PHQ9:  Given today   GAD7:  Given today   Questioned patient about current smoking habits Pt. has never smoked.  Ok to leave detailed message on voice mail for today's visit only Yes, phone # [phone]            Mirta Decker is a 57 year old female who presents to clinic today for the following health issues   HPI     Here for a followup on her medications, labs.  Diabetes: checks her blood sugars at home, usually run in lower 100s. She is doing well on her medications.  For her blood pressure--doesn't check it at home, but she was previously on 100 mg/day but then this made her sometimes feel lightheaded. Wants to continue on this dose for now. She previously had normal range blood pressures.   Follows closely with pulmonary.   Here with her  today.   Followed " by rheumatology for her arthritis, has seen dermatology in the past for her skin condition but is already on meds from rheumatology. Doesn't think she needs to see derm again right now.        Review of Systems   Constitutional, HEENT, cardiovascular, pulmonary, gi and gu systems are negative, except as otherwise noted.      Objective    BP (!) 140/86 (BP Location: Left arm, Patient Position: Sitting, Cuff Size: Adult Large)   Pulse 91   Temp 98  F (36.7  C) (Temporal)   Resp 26   Wt 144.7 kg (319 lb)   SpO2 98%   BMI 64.43 kg/m    Body mass index is 64.43 kg/m .  Physical Exam   GENERAL: healthy, alert and no distress  RESP: lungs clear to auscultation - no rales, rhonchi or wheezes  CV: regular rate and rhythm, normal S1 S2, no S3 or S4, no murmur, click or rub, no peripheral edema and peripheral pulses strong  MS: no gross musculoskeletal defects noted, no edema  NEURO: Normal strength and tone, mentation intact and speech normal  PSYCH: mentation appears normal, affect normal/bright  On oxygen    Results for orders placed or performed in visit on 03/23/22   HEMOGLOBIN A1C (BFP)     Status: None   Result Value Ref Range    Hemoglobin A1C POCT 6.4 4.0 - 7.0 %

## 2022-03-24 ASSESSMENT — ANXIETY QUESTIONNAIRES: GAD7 TOTAL SCORE: 4

## 2022-04-05 ENCOUNTER — OFFICE VISIT (OUTPATIENT)
Dept: PULMONOLOGY | Facility: OTHER | Age: 57
End: 2022-04-05
Payer: COMMERCIAL

## 2022-04-05 VITALS
DIASTOLIC BLOOD PRESSURE: 80 MMHG | RESPIRATION RATE: 24 BRPM | OXYGEN SATURATION: 96 % | WEIGHT: 293 LBS | HEART RATE: 81 BPM | SYSTOLIC BLOOD PRESSURE: 136 MMHG | BODY MASS INDEX: 64.51 KG/M2

## 2022-04-05 DIAGNOSIS — E66.2 OBESITY HYPOVENTILATION SYNDROME (H): ICD-10-CM

## 2022-04-05 DIAGNOSIS — G47.33 OSA (OBSTRUCTIVE SLEEP APNEA): Primary | ICD-10-CM

## 2022-04-05 DIAGNOSIS — J96.12 CHRONIC RESPIRATORY FAILURE WITH HYPOXIA AND HYPERCAPNIA (H): ICD-10-CM

## 2022-04-05 DIAGNOSIS — I89.0 LYMPHEDEMA: ICD-10-CM

## 2022-04-05 DIAGNOSIS — J96.11 CHRONIC RESPIRATORY FAILURE WITH HYPOXIA AND HYPERCAPNIA (H): ICD-10-CM

## 2022-04-05 PROCEDURE — 99213 OFFICE O/P EST LOW 20 MIN: CPT | Performed by: INTERNAL MEDICINE
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
## 2022-04-22 DIAGNOSIS — J96.11 CHRONIC RESPIRATORY FAILURE WITH HYPOXIA AND HYPERCAPNIA (H): Primary | ICD-10-CM

## 2022-04-22 DIAGNOSIS — I50.32 CHRONIC HEART FAILURE WITH PRESERVED EJECTION FRACTION (H): ICD-10-CM

## 2022-04-22 DIAGNOSIS — J96.12 CHRONIC RESPIRATORY FAILURE WITH HYPOXIA AND HYPERCAPNIA (H): Primary | ICD-10-CM

## 2022-04-22 RX ORDER — TORSEMIDE 20 MG/1
TABLET ORAL
Qty: 240 TABLET | Refills: 0 | Status: SHIPPED | OUTPATIENT
Start: 2022-04-22 | End: 2022-05-25

## 2022-05-02 ENCOUNTER — TRANSFERRED RECORDS (OUTPATIENT)
Dept: HEALTH INFORMATION MANAGEMENT | Facility: CLINIC | Age: 57
End: 2022-05-02
Payer: COMMERCIAL

## 2022-05-17 ENCOUNTER — TRANSFERRED RECORDS (OUTPATIENT)
Dept: FAMILY MEDICINE | Facility: CLINIC | Age: 57
End: 2022-05-17

## 2022-05-24 DIAGNOSIS — J96.11 CHRONIC RESPIRATORY FAILURE WITH HYPOXIA AND HYPERCAPNIA (H): ICD-10-CM

## 2022-05-24 DIAGNOSIS — I50.32 CHRONIC HEART FAILURE WITH PRESERVED EJECTION FRACTION (H): ICD-10-CM

## 2022-05-24 DIAGNOSIS — J96.12 CHRONIC RESPIRATORY FAILURE WITH HYPOXIA AND HYPERCAPNIA (H): ICD-10-CM

## 2022-05-25 RX ORDER — TORSEMIDE 20 MG/1
TABLET ORAL
Qty: 240 TABLET | Refills: 0 | Status: SHIPPED | OUTPATIENT
Start: 2022-05-25 | End: 2022-06-27

## 2022-06-14 ENCOUNTER — MEDICAL CORRESPONDENCE (OUTPATIENT)
Dept: PULMONOLOGY | Facility: OTHER | Age: 57
End: 2022-06-14
Payer: COMMERCIAL

## 2022-06-22 ENCOUNTER — MEDICAL CORRESPONDENCE (OUTPATIENT)
Dept: HEALTH INFORMATION MANAGEMENT | Facility: CLINIC | Age: 57
End: 2022-06-22

## 2022-06-24 DIAGNOSIS — J96.11 CHRONIC RESPIRATORY FAILURE WITH HYPOXIA AND HYPERCAPNIA (H): ICD-10-CM

## 2022-06-24 DIAGNOSIS — J96.12 CHRONIC RESPIRATORY FAILURE WITH HYPOXIA AND HYPERCAPNIA (H): ICD-10-CM

## 2022-06-24 DIAGNOSIS — I50.32 CHRONIC HEART FAILURE WITH PRESERVED EJECTION FRACTION (H): ICD-10-CM

## 2022-06-27 ENCOUNTER — MEDICAL CORRESPONDENCE (OUTPATIENT)
Dept: HEALTH INFORMATION MANAGEMENT | Facility: CLINIC | Age: 57
End: 2022-06-27

## 2022-06-27 RX ORDER — TORSEMIDE 20 MG/1
TABLET ORAL
Qty: 240 TABLET | Refills: 0 | Status: SHIPPED | OUTPATIENT
Start: 2022-06-27 | End: 2022-07-27

## 2022-07-27 ENCOUNTER — MYC REFILL (OUTPATIENT)
Dept: PULMONOLOGY | Facility: OTHER | Age: 57
End: 2022-07-27

## 2022-07-27 DIAGNOSIS — J96.12 CHRONIC RESPIRATORY FAILURE WITH HYPOXIA AND HYPERCAPNIA (H): ICD-10-CM

## 2022-07-27 DIAGNOSIS — J96.11 CHRONIC RESPIRATORY FAILURE WITH HYPOXIA AND HYPERCAPNIA (H): ICD-10-CM

## 2022-07-27 DIAGNOSIS — I50.32 CHRONIC HEART FAILURE WITH PRESERVED EJECTION FRACTION (H): ICD-10-CM

## 2022-07-27 RX ORDER — TORSEMIDE 20 MG/1
80 TABLET ORAL 2 TIMES DAILY
Qty: 240 TABLET | Refills: 0 | Status: SHIPPED | OUTPATIENT
Start: 2022-07-27 | End: 2022-08-30

## 2022-07-30 ENCOUNTER — HEALTH MAINTENANCE LETTER (OUTPATIENT)
Age: 57
End: 2022-07-30

## 2022-08-19 ENCOUNTER — TELEPHONE (OUTPATIENT)
Dept: SLEEP MEDICINE | Facility: CLINIC | Age: 57
End: 2022-08-19

## 2022-08-19 NOTE — TELEPHONE ENCOUNTER
Reason for call:  Other   Patient called regarding (reason for call):   In Coming FAX     Additional comments:   In Coming FAX on 8/17/22 from [facility].  They faxed a capacity questionnaire form.  Re at [facility]  [phone] ph  Return fax [phone]      Phone number to reach patient:  Other phone number:    Re at [facility]  [phone] ph  fax [phone]      Best Time:  any    Can we leave a detailed message on this number?  YES    Travel screening: Not Applicable

## 2022-08-19 NOTE — TELEPHONE ENCOUNTER
Spoke with a [employer] representative, the form will be refaxed to the sleep clinic.   Marie Watt MA

## 2022-08-26 ENCOUNTER — TRANSFERRED RECORDS (OUTPATIENT)
Dept: FAMILY MEDICINE | Facility: CLINIC | Age: 57
End: 2022-08-26

## 2022-08-30 ENCOUNTER — MYC REFILL (OUTPATIENT)
Dept: PULMONOLOGY | Facility: OTHER | Age: 57
End: 2022-08-30

## 2022-08-30 DIAGNOSIS — J96.11 CHRONIC RESPIRATORY FAILURE WITH HYPOXIA AND HYPERCAPNIA (H): ICD-10-CM

## 2022-08-30 DIAGNOSIS — I50.32 CHRONIC HEART FAILURE WITH PRESERVED EJECTION FRACTION (H): ICD-10-CM

## 2022-08-30 DIAGNOSIS — J96.12 CHRONIC RESPIRATORY FAILURE WITH HYPOXIA AND HYPERCAPNIA (H): ICD-10-CM

## 2022-08-30 RX ORDER — TORSEMIDE 20 MG/1
80 TABLET ORAL 2 TIMES DAILY
Qty: 240 TABLET | Refills: 0 | Status: SHIPPED | OUTPATIENT
Start: 2022-08-30 | End: 2022-10-02

## 2022-09-07 ENCOUNTER — MEDICAL CORRESPONDENCE (OUTPATIENT)
Dept: HEALTH INFORMATION MANAGEMENT | Facility: CLINIC | Age: 57
End: 2022-09-07

## 2022-09-08 NOTE — PROGRESS NOTES
DME Provider: [facility].  Date Faxed: 10/13/21  Ordering Provider: Dr. Villalobos  Equipment ordered: oxygen recertification   Pt ordered breakfast      Eladia Cesar RN  09/08/22 [id number]

## 2022-09-12 ENCOUNTER — OFFICE VISIT (OUTPATIENT)
Dept: SLEEP MEDICINE | Facility: CLINIC | Age: 57
End: 2022-09-12
Payer: COMMERCIAL

## 2022-09-12 VITALS
HEIGHT: 60 IN | SYSTOLIC BLOOD PRESSURE: 147 MMHG | BODY MASS INDEX: 57.52 KG/M2 | OXYGEN SATURATION: 97 % | DIASTOLIC BLOOD PRESSURE: 85 MMHG | HEART RATE: 86 BPM | WEIGHT: 293 LBS

## 2022-09-12 DIAGNOSIS — J96.12 CHRONIC HYPERCAPNIC RESPIRATORY FAILURE (H): ICD-10-CM

## 2022-09-12 DIAGNOSIS — G47.33 OSA TREATED WITH BIPAP: Primary | ICD-10-CM

## 2022-09-12 DIAGNOSIS — E66.01 MORBID OBESITY (H): ICD-10-CM

## 2022-09-12 PROCEDURE — 99215 OFFICE O/P EST HI 40 MIN: CPT | Performed by: INTERNAL MEDICINE

## 2022-09-12 NOTE — NURSING NOTE
Chief Complaint   Patient presents with     Sleep Problem     Follow up previous visit, not sleeping through the night        Initial BP (!) 147/85   Pulse 86   Ht 1.524 m (5')   Wt 148.8 kg (328 lb)   SpO2 97%   BMI 64.06 kg/m   Estimated body mass index is 64.06 kg/m  as calculated from the following:    Height as of this encounter: 1.524 m (5').    Weight as of this encounter: 148.8 kg (328 lb).    Medication Reconciliation: complete  ESS 11  BENITO 13  Marie Watt MA     no nausea/no vomiting/no weakness

## 2022-09-12 NOTE — PATIENT INSTRUCTIONS
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
## 2022-09-30 ENCOUNTER — OFFICE VISIT (OUTPATIENT)
Dept: FAMILY MEDICINE | Facility: CLINIC | Age: 57
End: 2022-09-30

## 2022-09-30 ENCOUNTER — TELEPHONE (OUTPATIENT)
Dept: SLEEP MEDICINE | Facility: CLINIC | Age: 57
End: 2022-09-30

## 2022-09-30 VITALS
BODY MASS INDEX: 57.52 KG/M2 | SYSTOLIC BLOOD PRESSURE: 124 MMHG | HEIGHT: 60 IN | TEMPERATURE: 98.2 F | WEIGHT: 293 LBS | OXYGEN SATURATION: 98 % | DIASTOLIC BLOOD PRESSURE: 82 MMHG | HEART RATE: 87 BPM

## 2022-09-30 DIAGNOSIS — R06.89 HYPERCAPNIA: ICD-10-CM

## 2022-09-30 DIAGNOSIS — K21.9 GASTROESOPHAGEAL REFLUX DISEASE WITHOUT ESOPHAGITIS: ICD-10-CM

## 2022-09-30 DIAGNOSIS — F32.5 MAJOR DEPRESSIVE DISORDER WITH SINGLE EPISODE, IN FULL REMISSION (H): ICD-10-CM

## 2022-09-30 DIAGNOSIS — E11.8 DIABETES MELLITUS TYPE 2 WITH COMPLICATIONS (H): ICD-10-CM

## 2022-09-30 DIAGNOSIS — Z99.81 OXYGEN DEPENDENT: ICD-10-CM

## 2022-09-30 DIAGNOSIS — J30.1 CHRONIC SEASONAL ALLERGIC RHINITIS DUE TO POLLEN: ICD-10-CM

## 2022-09-30 DIAGNOSIS — E11.9 TYPE 2 DIABETES MELLITUS WITHOUT COMPLICATION, WITHOUT LONG-TERM CURRENT USE OF INSULIN (H): Primary | ICD-10-CM

## 2022-09-30 DIAGNOSIS — I10 ESSENTIAL HYPERTENSION, BENIGN: ICD-10-CM

## 2022-09-30 DIAGNOSIS — E66.01 MORBID OBESITY WITH BMI OF 60.0-69.9, ADULT (H): ICD-10-CM

## 2022-09-30 DIAGNOSIS — D50.0 IRON DEFICIENCY ANEMIA DUE TO CHRONIC BLOOD LOSS: ICD-10-CM

## 2022-09-30 DIAGNOSIS — E87.6 HYPOKALEMIA: ICD-10-CM

## 2022-09-30 DIAGNOSIS — N18.30 STAGE 3 CHRONIC KIDNEY DISEASE, UNSPECIFIED WHETHER STAGE 3A OR 3B CKD (H): ICD-10-CM

## 2022-09-30 DIAGNOSIS — B35.4 TINEA CORPORIS: ICD-10-CM

## 2022-09-30 LAB
ALBUMIN SERPL-MCNC: 3.99 G/DL (ref 3.6–5.1)
ALBUMIN/GLOB SERPL: 1 {RATIO} (ref 1–2.5)
ALP SERPL-CCNC: 64 U/L (ref 33–130)
ALT 1742-6: 51 U/L (ref 0–32)
AST 1920-8: 53 U/L (ref 0–35)
BILIRUB SERPL-MCNC: 0.9 MG/DL (ref 0.2–1.2)
BUN SERPL-MCNC: 29 MG/DL (ref 7–25)
BUN/CREATININE RATIO: 22.3 (ref 6–22)
CALCIUM SERPL-MCNC: 9.3 MG/DL (ref 8.6–10.3)
CHLORIDE SERPLBLD-SCNC: 89.4 MMOL/L (ref 98–110)
CO2 SERPL-SCNC: 38.7 MMOL/L (ref 20–32)
CREAT SERPL-MCNC: 1.3 MG/DL (ref 0.6–1.3)
GLOBULIN, CALCULATED - QUEST: 4.1 (ref 1.9–3.7)
GLUCOSE SERPL-MCNC: 332 MG/DL (ref 60–99)
HBA1C MFR BLD: 7.9 % (ref 4–7)
POTASSIUM SERPL-SCNC: 3.22 MMOL/L (ref 3.5–5.3)
PROT SERPL-MCNC: 8 G/DL (ref 6.1–8.1)
SODIUM SERPL-SCNC: 136.4 MMOL/L (ref 135–146)

## 2022-09-30 PROCEDURE — 90715 TDAP VACCINE 7 YRS/> IM: CPT | Performed by: FAMILY MEDICINE

## 2022-09-30 PROCEDURE — 90472 IMMUNIZATION ADMIN EACH ADD: CPT | Performed by: FAMILY MEDICINE

## 2022-09-30 PROCEDURE — 90686 IIV4 VACC NO PRSV 0.5 ML IM: CPT | Performed by: FAMILY MEDICINE

## 2022-09-30 PROCEDURE — 80053 COMPREHEN METABOLIC PANEL: CPT | Performed by: FAMILY MEDICINE

## 2022-09-30 PROCEDURE — 36415 COLL VENOUS BLD VENIPUNCTURE: CPT | Performed by: FAMILY MEDICINE

## 2022-09-30 PROCEDURE — 83036 HEMOGLOBIN GLYCOSYLATED A1C: CPT | Performed by: FAMILY MEDICINE

## 2022-09-30 PROCEDURE — G0008 ADMIN INFLUENZA VIRUS VAC: HCPCS | Performed by: FAMILY MEDICINE

## 2022-09-30 PROCEDURE — 99215 OFFICE O/P EST HI 40 MIN: CPT | Mod: 25 | Performed by: FAMILY MEDICINE

## 2022-09-30 RX ORDER — SERTRALINE HYDROCHLORIDE 100 MG/1
TABLET, FILM COATED ORAL
Qty: 90 TABLET | Refills: 1 | Status: SHIPPED | OUTPATIENT
Start: 2022-09-30 | End: 2023-04-28

## 2022-09-30 RX ORDER — FAMOTIDINE 20 MG/1
20 TABLET, FILM COATED ORAL 2 TIMES DAILY
Qty: 180 TABLET | Refills: 1 | Status: SHIPPED | OUTPATIENT
Start: 2022-09-30 | End: 2023-11-03

## 2022-09-30 RX ORDER — POTASSIUM CHLORIDE 750 MG/1
10 TABLET, EXTENDED RELEASE ORAL 2 TIMES DAILY
Qty: 90 TABLET | Refills: 0 | Status: SHIPPED | OUTPATIENT
Start: 2022-09-30 | End: 2023-01-18

## 2022-09-30 RX ORDER — LOSARTAN POTASSIUM 50 MG/1
50 TABLET ORAL DAILY
Qty: 90 TABLET | Refills: 1 | Status: SHIPPED | OUTPATIENT
Start: 2022-09-30 | End: 2023-04-28

## 2022-09-30 RX ORDER — METFORMIN HCL 500 MG
1000 TABLET, EXTENDED RELEASE 24 HR ORAL 2 TIMES DAILY WITH MEALS
Qty: 360 TABLET | Refills: 1 | Status: SHIPPED | OUTPATIENT
Start: 2022-09-30 | End: 2023-04-28

## 2022-09-30 RX ORDER — CETIRIZINE HYDROCHLORIDE 10 MG/1
TABLET ORAL
Qty: 90 TABLET | Refills: 1 | Status: SHIPPED | OUTPATIENT
Start: 2022-09-30 | End: 2023-04-28

## 2022-09-30 RX ORDER — NYSTATIN 100000 [USP'U]/G
POWDER TOPICAL 2 TIMES DAILY PRN
Qty: 60 G | Refills: 1 | Status: SHIPPED | OUTPATIENT
Start: 2022-09-30 | End: 2023-11-03

## 2022-09-30 ASSESSMENT — ANXIETY QUESTIONNAIRES
7. FEELING AFRAID AS IF SOMETHING AWFUL MIGHT HAPPEN: NOT AT ALL
GAD7 TOTAL SCORE: 4
IF YOU CHECKED OFF ANY PROBLEMS ON THIS QUESTIONNAIRE, HOW DIFFICULT HAVE THESE PROBLEMS MADE IT FOR YOU TO DO YOUR WORK, TAKE CARE OF THINGS AT HOME, OR GET ALONG WITH OTHER PEOPLE: SOMEWHAT DIFFICULT
GAD7 TOTAL SCORE: 4
1. FEELING NERVOUS, ANXIOUS, OR ON EDGE: NOT AT ALL
5. BEING SO RESTLESS THAT IT IS HARD TO SIT STILL: SEVERAL DAYS
2. NOT BEING ABLE TO STOP OR CONTROL WORRYING: NOT AT ALL
6. BECOMING EASILY ANNOYED OR IRRITABLE: MORE THAN HALF THE DAYS
3. WORRYING TOO MUCH ABOUT DIFFERENT THINGS: NOT AT ALL

## 2022-09-30 ASSESSMENT — PATIENT HEALTH QUESTIONNAIRE - PHQ9
5. POOR APPETITE OR OVEREATING: SEVERAL DAYS
SUM OF ALL RESPONSES TO PHQ QUESTIONS 1-9: 9

## 2022-09-30 NOTE — TELEPHONE ENCOUNTER
Patient requesting KAILEY order to be faxed to [facility] at the  Fax: [phone].    Routed to clinic team to fax.     Melissa Owens RN on 9/30/2022 at 1:26 PM

## 2022-09-30 NOTE — ADDENDUM NOTE
Addended by: GUERDA POWELL on: 9/30/2022 02:34 PM     Modules accepted: Orders, Level of Service

## 2022-09-30 NOTE — PROGRESS NOTES
Assessment & Plan   Problem List Items Addressed This Visit        Respiratory    Oxygen dependent       Digestive    Morbid obesity with BMI of 60.0-69.9, adult (H)    Relevant Medications    metFORMIN (GLUCOPHAGE XR) 500 MG 24 hr tablet       Endocrine    Type 2 diabetes mellitus without complication, without long-term current use of insulin (H) - Primary    Relevant Medications    losartan (COZAAR) 50 MG tablet    metFORMIN (GLUCOPHAGE XR) 500 MG 24 hr tablet    Other Relevant Orders    VENOUS COLLECTION (Completed)    HEMOGLOBIN A1C (BFP) (Completed)    Comprehensive Metobolic Panel (BFP)       Circulatory    Essential hypertension, benign    Relevant Medications    losartan (COZAAR) 50 MG tablet       Urinary    Chronic kidney disease, stage 3 (H)       Hematologic    Anemia    Relevant Medications    famotidine (PEPCID) 20 MG tablet      Other Visit Diagnoses     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)        Relevant Medications    losartan (COZAAR) 50 MG tablet    metFORMIN (GLUCOPHAGE XR) 500 MG 24 hr tablet    Other Relevant Orders    AMB Adult Diabetes Educator Referral    Tinea corporis        Relevant Medications    nystatin (MYCOSTATIN) 349664 UNIT/GM external powder    cetirizine (ZYRTEC) 10 MG tablet    Major depressive disorder with single episode, in full remission (H)        Relevant Medications    sertraline (ZOLOFT) 100 MG tablet    Chronic seasonal allergic rhinitis due to pollen        Relevant Medications    cetirizine (ZYRTEC) 10 MG tablet    Gastroesophageal reflux disease without esophagitis        Relevant Medications    famotidine (PEPCID) 20 MG tablet    Hypokalemia             1. Type 2 diabetes mellitus without complication, without long-term current use of insulin (H)  Check labs, higher than previous. Consider change/addition to medications. She wants to continue first to work on diet/exercise/weight loss and recheck again in 3-6 months.   - VENOUS COLLECTION  - HEMOGLOBIN  A1C (BFP)  - Comprehensive Metobolic Panel (BFP)  - FOOT EXAM  NO CHARGE [[id number]]    2. Essential hypertension, benign  Controlled on medications, refilled.  - losartan (COZAAR) 50 MG tablet; Take 1 tablet (50 mg) by mouth daily HOLD if systolic Blood pressure is under 115  Dispense: 90 tablet; Refill: 1    3. Stage 3 chronic kidney disease, unspecified whether stage 3a or 3b CKD (H)  Watch closely.    4. 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)    - losartan (COZAAR) 50 MG tablet; Take 1 tablet (50 mg) by mouth daily HOLD if systolic Blood pressure is under 115  Dispense: 90 tablet; Refill: 1  - metFORMIN (GLUCOPHAGE XR) 500 MG 24 hr tablet; Take 2 tablets (1,000 mg) by mouth 2 times daily (with meals)  Dispense: 360 tablet; Refill: 1    5. Morbid obesity with BMI of 60.0-69.9, adult (H)    - metFORMIN (GLUCOPHAGE XR) 500 MG 24 hr tablet; Take 2 tablets (1,000 mg) by mouth 2 times daily (with meals)  Dispense: 360 tablet; Refill: 1    6. Tinea corporis  Refilled.  - nystatin (MYCOSTATIN) 782820 UNIT/GM external powder; Apply topically 2 times daily as needed for other (fungal) , under breasts  Dispense: 60 g; Refill: 1    7. Major depressive disorder with single episode, in full remission (H)  Symptoms are controlled, refilled.  - sertraline (ZOLOFT) 100 MG tablet; TAKE 1 TABLET BY MOUTH ONE TIME DAILY  Dispense: 90 tablet; Refill: 1    8. Chronic seasonal allergic rhinitis due to pollen  Refilled.  - cetirizine (ZYRTEC) 10 MG tablet; TAKE 1 TABLET BY MOUTH ONE TIME DAILY  Dispense: 90 tablet; Refill: 1    9. Gastroesophageal reflux disease without esophagitis  Refilled.  - famotidine (PEPCID) 20 MG tablet; Take 1 tablet (20 mg) by mouth 2 times daily  Dispense: 180 tablet; Refill: 1    10. Iron deficiency anemia due to chronic blood loss    - famotidine (PEPCID) 20 MG tablet; Take 1 tablet (20 mg) by mouth 2 times daily  Dispense: 180 tablet; Refill: 1    11. Oxygen dependent  Followed by  pulmonary      12. Hypokalemia  Start potassium, recheck and see me back Monday/tuesday of next week.    13. Hypercapnia  I will advise her to get in touch with her pulmonologist today. She is on chronic oxygen.       BMI:   Estimated body mass index is 63.28 kg/m  as calculated from the following:    Height as of this encounter: 1.524 m (5').    Weight as of this encounter: 147 kg (324 lb).         FUTURE APPOINTMENTS:       - Follow-up visit in 3-6 months.    No follow-ups on file.    Noelle Garcia MD  [facility]    Subjective     Nursing Notes:   Rain Walsh  9/30/2022 10:44 AM  Signed  Chief Complaint   Patient presents with     Diabetes     Recheck Medication     Fasting diabetes med check         Pre-visit Screening:  Immunizations:  up to date  Colonoscopy:  is due and to be scheduled by patient for later completion  Mammogram: is up to date  Asthma Action Test/Plan:  NA-- goes to pulmonology  PHQ9:  Done today  GAD7:  Done today  Questioned patient about current smoking habits Pt. has never smoked.  Ok to leave detailed message on voice mail for today's visit only Yes, phone # [phone]             Mirta Decker is a 57 year old female who presents to clinic today for the following health issues   HPI     Here with daughter. She is doing well.  Will be seeing the sleep doctor. Will see pulmonologist and also rheumatologist. Not much has changed but adjusting the dosing of the methotrexate.    Here today for recheck on diabetes and blood pressures.  Thinks her blood sugars might be a little high. Mornings are good 110-120, sometimes lower. Evenings are sometimes higher. Highest 248--but after cake.   Blood pressures--134-140. Not concerned.     Also needs refills on :  nystatis--fungal infection under folds.  Allergies--controlled with zyrtec. Notices if she doesn't take it.   Sertraline for depression/anxiety, this feels well controlled.    Went back to OT.       Review of  Systems   Constitutional, HEENT, cardiovascular, pulmonary, gi and gu systems are negative, except as otherwise noted.      Objective    /82 (BP Location: Right arm, Patient Position: Sitting, Cuff Size: Adult Large)   Pulse 87   Temp 98.2  F (36.8  C) (Temporal)   Ht 1.524 m (5')   Wt 147 kg (324 lb)   SpO2 98%   BMI 63.28 kg/m    Body mass index is 63.28 kg/m .  Physical Exam   GENERAL: healthy, alert and no distress  RESP: lungs clear to auscultation - no rales, rhonchi or wheezes  CV: regular rate and rhythm, normal S1 S2, no S3 or S4, no murmur, click or rub, no peripheral edema and peripheral pulses strong  MS: no gross musculoskeletal defects noted, no edema  NEURO: Normal strength and tone, mentation intact and speech normal  PSYCH: mentation appears normal, affect normal/bright  On oxygen by NC    Results for orders placed or performed in visit on 09/30/22   HEMOGLOBIN A1C (BFP)     Status: Abnormal   Result Value Ref Range    Hemoglobin A1C 7.9 (A) 4.0 - 7.0 %

## 2022-09-30 NOTE — TELEPHONE ENCOUNTER
Writer left a detail message for patient stating the KAILEY orders were faxed to [facility] with number provided below.

## 2022-09-30 NOTE — NURSING NOTE
Chief Complaint   Patient presents with     Diabetes     Recheck Medication     Fasting diabetes med check         Pre-visit Screening:  Immunizations:  up to date  Colonoscopy:  is due and to be scheduled by patient for later completion  Mammogram: is up to date  Asthma Action Test/Plan:  NA-- goes to pulmonology  PHQ9:  Done today  GAD7:  Done today  Questioned patient about current smoking habits Pt. has never smoked.  Ok to leave detailed message on voice mail for today's visit only Yes, phone # [phone]

## 2022-10-02 ENCOUNTER — MYC REFILL (OUTPATIENT)
Dept: PULMONOLOGY | Facility: OTHER | Age: 57
End: 2022-10-02

## 2022-10-02 DIAGNOSIS — J96.11 CHRONIC RESPIRATORY FAILURE WITH HYPOXIA AND HYPERCAPNIA (H): ICD-10-CM

## 2022-10-02 DIAGNOSIS — I50.32 CHRONIC HEART FAILURE WITH PRESERVED EJECTION FRACTION (H): ICD-10-CM

## 2022-10-02 DIAGNOSIS — J96.12 CHRONIC RESPIRATORY FAILURE WITH HYPOXIA AND HYPERCAPNIA (H): ICD-10-CM

## 2022-10-03 RX ORDER — TORSEMIDE 20 MG/1
80 TABLET ORAL 2 TIMES DAILY
Qty: 240 TABLET | Refills: 0 | Status: SHIPPED | OUTPATIENT
Start: 2022-10-03 | End: 2022-10-10

## 2022-10-03 NOTE — TELEPHONE ENCOUNTER
Received a call stating [facility] does not do KAILEY. Writer spoke with patient and notified her of this. Writer suggested going to [facility] to complete KAILEY. Writer sent a [employer] message, per patient's request, with the phone number to schedule the KAILEY at [facility]. No further actions needed.

## 2022-10-04 ENCOUNTER — OFFICE VISIT (OUTPATIENT)
Dept: FAMILY MEDICINE | Facility: CLINIC | Age: 57
End: 2022-10-04

## 2022-10-04 VITALS
WEIGHT: 293 LBS | HEART RATE: 104 BPM | DIASTOLIC BLOOD PRESSURE: 84 MMHG | TEMPERATURE: 97.8 F | OXYGEN SATURATION: 93 % | BODY MASS INDEX: 57.52 KG/M2 | HEIGHT: 60 IN | SYSTOLIC BLOOD PRESSURE: 126 MMHG

## 2022-10-04 DIAGNOSIS — R06.89 HYPERCAPNIA: ICD-10-CM

## 2022-10-04 DIAGNOSIS — E87.6 HYPOKALEMIA: Primary | ICD-10-CM

## 2022-10-04 DIAGNOSIS — R79.89 ELEVATED LFTS: ICD-10-CM

## 2022-10-04 LAB
ALBUMIN SERPL-MCNC: 3.8 G/DL (ref 3.6–5.1)
ALBUMIN/GLOB SERPL: 0.9 {RATIO} (ref 1–2.5)
ALP SERPL-CCNC: 64 U/L (ref 33–130)
ALT 1742-6: 46 U/L (ref 0–32)
AST 1920-8: 61 U/L (ref 0–35)
BILIRUB SERPL-MCNC: 0.6 MG/DL (ref 0.2–1.2)
BUN SERPL-MCNC: 26 MG/DL (ref 7–25)
BUN/CREATININE RATIO: 22.4 (ref 6–22)
CALCIUM SERPL-MCNC: 9.1 MG/DL (ref 8.6–10.3)
CHLORIDE SERPLBLD-SCNC: 97.3 MMOL/L (ref 98–110)
CO2 SERPL-SCNC: 33.6 MMOL/L (ref 20–32)
CREAT SERPL-MCNC: 1.16 MG/DL (ref 0.6–1.3)
GLOBULIN, CALCULATED - QUEST: 4.1 (ref 1.9–3.7)
GLUCOSE SERPL-MCNC: 201 MG/DL (ref 60–99)
POTASSIUM SERPL-SCNC: 3.94 MMOL/L (ref 3.5–5.3)
PROT SERPL-MCNC: 7.9 G/DL (ref 6.1–8.1)
SODIUM SERPL-SCNC: 141.3 MMOL/L (ref 135–146)

## 2022-10-04 PROCEDURE — 36415 COLL VENOUS BLD VENIPUNCTURE: CPT | Performed by: FAMILY MEDICINE

## 2022-10-04 PROCEDURE — 99214 OFFICE O/P EST MOD 30 MIN: CPT | Performed by: FAMILY MEDICINE

## 2022-10-04 PROCEDURE — 80053 COMPREHEN METABOLIC PANEL: CPT | Performed by: FAMILY MEDICINE

## 2022-10-04 NOTE — NURSING NOTE
Chief Complaint   Patient presents with     RECHECK     Recheck potassium      Pre-visit Screening:  Immunizations:  not up to date - pt declines  Colonoscopy:  Pt declines  Mammogram: pt declines  Asthma Action Test/Plan:  NA  PHQ9:  NA  GAD7:  NA  Questioned patient about current smoking habits Pt. has never smoked.  Ok to leave detailed message on voice mail for today's visit only Yes, phone # [phone]

## 2022-10-04 NOTE — PROGRESS NOTES
Assessment & Plan   Problem List Items Addressed This Visit    None     Visit Diagnoses     Hypokalemia    -  Primary    Relevant Orders    VENOUS COLLECTION (Completed)    Comprehensive Metobolic Panel ([facility])    Elevated LFTs        Hypercapnia             1. Hypokalemia  Check labs today.   - VENOUS COLLECTION  - Comprehensive Metobolic Panel ([facility])    2. Elevated LFTs  She will check with her rheumatologist. She thinks it might be due to the methotrexate.     3. Hypercapnia  Followed by pulmonary.           BMI:   Estimated body mass index is 63.28 kg/m  as calculated from the following:    Height as of this encounter: 1.524 m (5').    Weight as of this encounter: 147 kg (324 lb).         FUTURE APPOINTMENTS:       - Follow-up visit per results.    No follow-ups on file.    Noelle Garcia MD  [facility]    Subjective     Nursing Notes:   Sandi Boland CMA  10/4/2022  1:09 PM  Addendum  Chief Complaint   Patient presents with     RECHECK     Recheck potassium      Pre-visit Screening:  Immunizations:  not up to date - pt declines  Colonoscopy:  Pt declines  Mammogram: pt declines  Asthma Action Test/Plan:  NA  PHQ9:  NA  GAD7:  NA  Questioned patient about current smoking habits Pt. has never smoked.  Ok to leave detailed message on voice mail for today's visit only Yes, phone # [phone]           Mirta Decker is a 57 year old female who presents to clinic today for the following health issues   HPI     Here with .    Pulmonologist has been in touch with her. Wants to do sleep study, pulse ox. Her co2 was high, stable. But pulm is aware. Has ABG scheduled. She is on chronic oxygen. Uses bipap at night and during naps.    Also had high K, now is taking twice daily.    Diabetes--has diabetes education scheduled.    Has had high liver functions--sees rheumatology. The lfts in August, similar and this could be from the methotrexate. She thinks that lfts were similar then. Knows she  needs to discuss with her.         Review of Systems   Constitutional, HEENT, cardiovascular, pulmonary, gi and gu systems are negative, except as otherwise noted.      Objective    /84 (BP Location: Left arm, Patient Position: Sitting, Cuff Size: Adult Large)   Pulse 104   Temp 97.8  F (36.6  C) (Temporal)   Ht 1.524 m (5')   Wt 147 kg (324 lb)   SpO2 93%   BMI 63.28 kg/m    Body mass index is 63.28 kg/m .  Physical Exam   GENERAL: healthy, alert and no distress  MS: no gross musculoskeletal defects noted, no edema  NEURO: Normal strength and tone, mentation intact and speech normal  PSYCH: mentation appears normal, affect normal/bright  On oxygen    No results found for any visits on 10/04/22.

## 2022-10-10 ENCOUNTER — OFFICE VISIT (OUTPATIENT)
Dept: PULMONOLOGY | Facility: OTHER | Age: 57
End: 2022-10-10
Payer: COMMERCIAL

## 2022-10-10 ENCOUNTER — MEDICAL CORRESPONDENCE (OUTPATIENT)
Dept: HEALTH INFORMATION MANAGEMENT | Facility: CLINIC | Age: 57
End: 2022-10-10

## 2022-10-10 ENCOUNTER — LAB (OUTPATIENT)
Dept: LAB | Facility: HOSPITAL | Age: 57
End: 2022-10-10
Payer: COMMERCIAL

## 2022-10-10 VITALS
OXYGEN SATURATION: 97 % | DIASTOLIC BLOOD PRESSURE: 72 MMHG | WEIGHT: 293 LBS | SYSTOLIC BLOOD PRESSURE: 128 MMHG | HEART RATE: 93 BPM | BODY MASS INDEX: 63.88 KG/M2

## 2022-10-10 DIAGNOSIS — E66.2 OBESITY HYPOVENTILATION SYNDROME (H): ICD-10-CM

## 2022-10-10 DIAGNOSIS — J94.2 HEMOTHORAX, RIGHT: ICD-10-CM

## 2022-10-10 DIAGNOSIS — G47.33 OSA TREATED WITH BIPAP: ICD-10-CM

## 2022-10-10 DIAGNOSIS — G47.33 OSA (OBSTRUCTIVE SLEEP APNEA): Primary | ICD-10-CM

## 2022-10-10 DIAGNOSIS — I50.32 CHRONIC HEART FAILURE WITH PRESERVED EJECTION FRACTION (H): ICD-10-CM

## 2022-10-10 DIAGNOSIS — E66.01 MORBID OBESITY WITH BMI OF 60.0-69.9, ADULT (H): ICD-10-CM

## 2022-10-10 DIAGNOSIS — J96.12 CHRONIC RESPIRATORY FAILURE WITH HYPOXIA AND HYPERCAPNIA (H): ICD-10-CM

## 2022-10-10 DIAGNOSIS — J96.11 CHRONIC RESPIRATORY FAILURE WITH HYPOXIA AND HYPERCAPNIA (H): ICD-10-CM

## 2022-10-10 DIAGNOSIS — I89.0 LYMPH EDEMA: ICD-10-CM

## 2022-10-10 LAB
BASE EXCESS BLDA CALC-SCNC: 7.6 MMOL/L
COHGB MFR BLD: 97.3 % (ref 96–97)
HCO3 BLD-SCNC: 30 MMOL/L (ref 23–29)
OXYHGB MFR BLD: 95.7 % (ref 96–97)
PCO2 BLD: 53 MM HG (ref 35–45)
PH BLD: 7.39 [PH] (ref 7.37–7.44)
PO2 BLD: 88 MM HG (ref 80–90)
TEMPERATURE: 37 DEGREES C

## 2022-10-10 PROCEDURE — 99213 OFFICE O/P EST LOW 20 MIN: CPT | Performed by: INTERNAL MEDICINE

## 2022-10-10 PROCEDURE — 82805 BLOOD GASES W/O2 SATURATION: CPT

## 2022-10-10 RX ORDER — METOLAZONE 2.5 MG/1
2.5 TABLET ORAL DAILY PRN
Qty: 30 TABLET | Refills: 11 | Status: SHIPPED | OUTPATIENT
Start: 2022-10-10

## 2022-10-10 RX ORDER — ALBUTEROL SULFATE 90 UG/1
2 AEROSOL, METERED RESPIRATORY (INHALATION) EVERY 6 HOURS
Qty: 18 G | Refills: 11 | Status: SHIPPED | OUTPATIENT
Start: 2022-10-10

## 2022-10-10 RX ORDER — TORSEMIDE 20 MG/1
80 TABLET ORAL 2 TIMES DAILY
Qty: 240 TABLET | Refills: 11 | Status: SHIPPED | OUTPATIENT
Start: 2022-10-10 | End: 2023-10-23

## 2022-10-10 NOTE — PROGRESS NOTES
Patient instructed in use of Overnight Oximetry Testing equipment from [facility].  Patient states good understanding ofhow to use the Overnight Testing equipment.  All paperwork signed, faxed to [facility], and copy scanned to chart.   Phone numbers given to patient to call with any questions.
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
## 2022-10-13 ENCOUNTER — TRANSFERRED RECORDS (OUTPATIENT)
Dept: HEALTH INFORMATION MANAGEMENT | Facility: CLINIC | Age: 57
End: 2022-10-13

## 2022-10-19 ENCOUNTER — ALLIED HEALTH/NURSE VISIT (OUTPATIENT)
Dept: EDUCATION SERVICES | Facility: CLINIC | Age: 57
End: 2022-10-19
Attending: FAMILY MEDICINE
Payer: COMMERCIAL

## 2022-10-19 DIAGNOSIS — E11.8 DIABETES MELLITUS TYPE 2 WITH COMPLICATIONS (H): ICD-10-CM

## 2022-10-19 PROCEDURE — 95249 CONT GLUC MNTR PT PROV EQP: CPT

## 2022-10-19 PROCEDURE — G0108 DIAB MANAGE TRN  PER INDIV: HCPCS
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
## 2022-10-19 NOTE — PATIENT INSTRUCTIONS
Care Plan:  Meal Plan Recommendation: eat 3 meals a day, have small snacks between meals, if needed, use portion control, use plate planning method, and incorporate some protein to each meal and your evening snack.  Exercise / activity plan: continue to work on moving your body throughout the day    Continue checking blood sugars fasting and 2 hours after dinner.     Keep a food record for the next visit.    Follow up:  Follow-up diabetes education appointment scheduled on 11/2/22 at 1:00pm.      Bring blood glucose meter and logbook with you to all doctor and follow-up appointments.       Tg sensor:  1. Plan to wear the LibrePro sensor for 14 days. It is okay to shower, bathe, and swim (up to 3 feet deep for 30 minutes)    2. Continue with your usual diabetes care plan - check blood sugars and take medicines, as prescribed.    3. Keep a log of what you eat and drink, when you take your medications and how much you take, and exercise you do while you are wearing the sensor.    4. Do not cover the sensor with extra adhesive (the small hole in the center of the sensor must remain uncovered)    5. Use a little extra care, especially when getting dressed or exercising, to avoid accidentally loosening or removing the sensor.     6. Remove the sensor if you need to have an MRI or CT scan.     If the LibrePro sensor comes off early, place it in a plastic bag or envelope and call your diabetes educator or bring it with you to your follow-up visit.     [facility] the [facility]:  For Your Diabetes Education and Nutrition Appointments Call:  [phone]   For Diabetes Education or Nutrition Related Questions:   Phone: [phone]  Send SPEEDELO Message   If you need a medication refill please contact your pharmacy. Please allow 3 business days for your refills to be completed.

## 2022-10-27 ENCOUNTER — TELEPHONE (OUTPATIENT)
Dept: PULMONOLOGY | Facility: OTHER | Age: 57
End: 2022-10-27

## 2022-10-27 NOTE — TELEPHONE ENCOUNTER
Called and spoke with patient regarding results of KAILEY testing.    Per Dr. Villalobos:  Current settings are good.  No changes needed.   (only 4 min <88%).    Elyssa had no further questions.

## 2022-10-31 ENCOUNTER — MYC MEDICAL ADVICE (OUTPATIENT)
Dept: FAMILY MEDICINE | Facility: CLINIC | Age: 57
End: 2022-10-31

## 2022-10-31 DIAGNOSIS — E11.9 TYPE 2 DIABETES MELLITUS WITHOUT COMPLICATION, WITHOUT LONG-TERM CURRENT USE OF INSULIN (H): ICD-10-CM

## 2022-10-31 RX ORDER — BLOOD SUGAR DIAGNOSTIC
STRIP MISCELLANEOUS
Qty: 100 STRIP | Refills: 3 | Status: SHIPPED | OUTPATIENT
Start: 2022-10-31 | End: 2023-11-03

## 2022-10-31 NOTE — TELEPHONE ENCOUNTER
Please review pt's MyChart message.    Mirta Decker is requesting a refill of:    Pending Prescriptions:                       Disp   Refills    blood glucose (ACCU-CHEK GUIDE) test [id number] st*3            Sig: Use to test blood sugar 3 times daily or as           directed.

## 2022-11-02 ENCOUNTER — ALLIED HEALTH/NURSE VISIT (OUTPATIENT)
Dept: EDUCATION SERVICES | Facility: CLINIC | Age: 57
End: 2022-11-02
Payer: COMMERCIAL

## 2022-11-02 DIAGNOSIS — E11.8 DIABETES MELLITUS TYPE 2 WITH COMPLICATIONS (H): Primary | ICD-10-CM

## 2022-11-02 PROCEDURE — G0108 DIAB MANAGE TRN  PER INDIV: HCPCS

## 2022-11-02 NOTE — PROGRESS NOTES
Diabetes Self-Management Education & Support    Presents for: Individual review    Type of Service: In Person Visit    Assessment Type:   REPORTS:          Pt verbalized understanding of concepts discussed and recommendations provided today.       ASSESSMENT:  Review of CGM report. Glucose overall average 136mg/dl,  in target 90%, above target 10% and below target 0% of the time. Pattern of post breakfast hyperglycemia.     Patient has been working on healthy eating and her portions, following the plate planner guide and is limiting her chocolate intake. Her breakfast is higher in carbohydrates (60-75 grams). She I working on moving her body more with household chores, and she has some physical therapy exercise equipment she has been using.     Recommend she continue to work on portions and decrease her carbohydrates at breakfast, and continue to add daily activity as she is able.     PLAN  Meal Plan Recommendation: continue to eat 3 meals a day, have small snacks between meals, if needed, use portion control, use plate planning method, and incorporate some protein to each meal and your evening snack.  -watch your carbohydrate portion at breakfast  Exercise / activity plan: continue to work on moving your body throughout the day  Checking blood sugars once a week, test before and 2 hours after a meal, rotate between breakfast and dinner.     Schedule your eye exam  Schedule your dental exam    Follow-up: yearly or sooner if needed    See Care Plan for co-developed, patient-state behavior change goals.  AVS provided for patient today.    Education Materials Provided:  No new materials provided today      SUBJECTIVE/OBJECTIVE:  Presents for: Individual review  Accompanied by: Self, Daughter (Martha)  Diabetes education in the past 24mo: No  Focus of Visit: CGM  Diabetes type: Type 2  Disease course: Worsening  How confident are you filling out medical forms by yourself:: Extremely  Diabetes management related  comments/concerns: hunger pains in overnight hours_refocus my goals  Transportation concerns: No  Difficulty affording diabetes medication?: No  Difficulty affording diabetes testing supplies?: No  Other concerns:: None  Cultural Influences/Ethnic Background:  Not  or     Diabetes Symptoms & Complications:  Fatigue: Yes  Neuropathy: No  Polydipsia: Sometimes  Polyphagia: Sometimes  Polyuria: Yes  Visual change: Yes  Slow healing wounds: Sometimes  Complications assessed today?: Yes  Autonomic neuropathy: No  CVA: No  Heart disease: No  Nephropathy: Other  Peripheral neuropathy: No  Peripheral Vascular Disease: No  Retinopathy: No  Sexual dysfunction: No    Patient Problem List and Family Medical History reviewed for relevant medical history, current medical status, and diabetes risk factors.    Vitals:  There were no vitals taken for this visit.  Estimated body mass index is 63.88 kg/m  as calculated from the following:    Height as of 10/4/22: 1.524 m (5').    Weight as of 10/10/22: 148.4 kg (327 lb 1.6 oz).   Last 3 BP:   BP Readings from Last 3 Encounters:   10/10/22 128/72   10/04/22 126/84   09/30/22 124/82       History   Smoking Status     Never   Smokeless Tobacco     Never       Labs:  Lab Results   Component Value Date    A1C 7.9 09/30/2022     Lab Results   Component Value Date     10/04/2022     Lab Results   Component Value Date     09/09/2021    LDL 76 05/07/2016     HDL Cholesterol   Date Value Ref Range Status   09/09/2021 52 40 - 150 mg/dL Final   ]  GFR Estimate   Date Value Ref Range Status   03/09/2020 57 (L) >60 mL/min/[1.73_m2] Final     Comment:     Non  GFR Calc  Starting 12/18/2018, serum creatinine based estimated GFR (eGFR) will be   calculated using the Chronic Kidney Disease Epidemiology Collaboration   (CKD-EPI) equation.       GFR Estimate If Black   Date Value Ref Range Status   03/09/2020 66 >60 mL/min/[1.73_m2] Final     Comment:       GFR Calc  Starting 12/18/2018, serum creatinine based estimated GFR (eGFR) will be   calculated using the Chronic Kidney Disease Epidemiology Collaboration   (CKD-EPI) equation.       Lab Results   Component Value Date    CR 1.16 10/04/2022     No results found for: MICROALBUMIN    Healthy Eating:  Healthy Eating Assessed Today: Yes  Cultural/Buddhism diet restrictions?: No  Meal planning/habits: Avoiding sweets, Calorie counting, Carb counting, Low carb, Heart healthy, Low salt, Smaller portions, Plate planning method, Keeps food records, Frequent snacking  Breakfast: banana, turkey, laughing cow wedge Or banana, oatmeal, with baked apples, greek yourt with raspberries, rice cake  Lunch: bell peppers, pear, 1/2 plum, orzo wiht sun dried tomatoes and green beans Or soup and sanad  Dinner: trell slider, cheesy potatoes,sliced tomato  OR low carb tortilla, with peppers, onionns, tomatoes, sour cream, onion rings  Snacks: crackers, skinny popcorn, pretzels crisps  Beverages: Water, Coffee, Tea, Diet soda  Has patient met with a dietitian in the past?: Yes    Being Active:  Being Active Assessed Today: Yes  Exercise:: Unable to exercise (physical limitations- does walk around the house and do house chores)  Barrier to exercise: Time, Access, Physical limitation    Monitoring:  Monitoring Assessed Today: Yes  Did patient bring glucose meter to appointment? : No  Blood Glucose Meter: Accu-chek  Times checking blood sugar at home (number): 2  Times checking blood sugar at home (per): Day  Blood glucose trend: Increasing    Taking Medications:  Diabetes Medication(s)     Biguanides       metFORMIN (GLUCOPHAGE XR) 500 MG 24 hr tablet    Take 2 tablets (1,000 mg) by mouth 2 times daily (with meals)          Taking Medication Assessed Today: Yes  Current Treatments: Oral Medication (taken by mouth)  Problems taking diabetes medications regularly?: No  Diabetes medication side effects?: No    Problem  Solving:  Problem Solving Assessed Today: Yes  Is the patient at risk for hypoglycemia?: No    Reducing Risks:  Reducing Risks Assessed Today: Yes  Diabetes Risks: Age over 45 years, Sedentary Lifestyle  CAD Risks: Diabetes Mellitus, Dyslipidemia, Family history, Hypertension, Obesity, Post-menopausal, Sedentary lifestyle  Has dilated eye exam at least once a year?: No  Sees dentist every 6 months?: No  Feet checked by healthcare provider in the last year?: Yes    Healthy Coping:  Healthy Coping Assessed Today: Yes  Emotional response to diabetes: Ready to learn  Informal Support system:: Children, Family, Friends, Parent, Spouse  Stage of change: ACTION (Actively working towards change)  Patient Activation Measure Survey Score:  MAURICE Score (Last Two) 9/9/2015   MAURICE Raw Score 46   Activation Score 75.3   MAURICE Level 4         Care Plan and Education Provided:  Care Plan: Diabetes   Updates made by Meme Murray RN since 11/2/2022 12:00 AM      Problem: Diabetes Self-Management Education Needed to Optimize Self-Care Behaviors       Goal: Healthy Eating - follow a healthy eating pattern for diabetes       Task: Provide education on portion control and consistency in amount, composition and timing of food intake Completed 11/2/2022   Responsible User: Meme Murray RN      Task: Provide education on weight management Completed 11/2/2022   Responsible User: Meme Murray RN      Task: Provide education on heart healthy eating    Responsible User: Meme Murray RN      Task: Provide education on eating out    Responsible User: Meme Murray RN      Task: Develop individualized healthy eating plan with patient    Responsible User: Meme Murray RN      Goal: Taking Medication - patient is consistently taking medications as directed       Task: Provide education on action of prescribed medication, including when to take and possible side effects Completed 11/2/2022   Responsible User: Meme Murray  RN      Goal: Problem Solving - know how to prevent and manage short-term diabetes complications       Task: Provide education on high blood glucose - causes, signs/symptoms, prevention and treatment Completed 11/2/2022   Responsible User: Meme Murray RN      Task: Provide education on low blood glucose - causes, signs/symptoms, prevention, treatment, carrying a carbohydrate source at all times, and medical identification Completed 11/2/2022   Responsible User: Meme Murray RN      Task: Provide education on when to call a health care provider Completed 11/2/2022   Responsible User: Meme Murray RN      Goal: Reducing Risks - know how to prevent and treat long-term diabetes complications       Task: Provide education on major complications of diabetes, prevention, early diagnostic measures and treatment of complications Completed 11/2/2022   Responsible User: Meme Murray RN      Task: Provide education on recommended care for dental, eye and foot health Completed 11/2/2022   Responsible User: Meme Murray RN      Task: Provide education on Hemoglobin A1c - goals and relationship to blood glucose levels Completed 11/2/2022   Responsible User: Meme Murray RN      Goal: Healthy Coping - use available resources to cope with the challenges of managing diabetes       Task: Discuss recognizing feelings about having diabetes Completed 11/2/2022   Responsible User: Meme Murray RN      Task: Provide education on the benefits of making appropriate lifestyle changes Completed 11/2/2022   Responsible User: Meme Murray RN Vickie Baeyen BSN, RN, [facility]     Time Spent: 45 minutes  Encounter Type: Individual    Any diabetes medication dose changes were made via the CDE Protocol per the patient's referring provider. A copy of this encounter was shared with the provider.

## 2022-11-02 NOTE — LETTER
11/2/2022         RE: Mirat Decker  [address] [facility] [address]        Dear Colleague,    Thank you for referring your patient, Mirta Decker, to the [facility]. Please see a copy of my visit note below.    Diabetes Self-Management Education & Support    Presents for: Individual review    Type of Service: In Person Visit    Assessment Type:   REPORTS:          Pt verbalized understanding of concepts discussed and recommendations provided today.       ASSESSMENT:  Review of CGM report. Glucose overall average 136mg/dl,  in target 90%, above target 10% and below target 0% of the time. Pattern of post breakfast hyperglycemia.     Patient has been working on healthy eating and her portions, following the plate planner guide and is limiting her chocolate intake. Her breakfast is higher in carbohydrates (60-75 grams). She I working on moving her body more with household chores, and she has some physical therapy exercise equipment she has been using.     Recommend she continue to work on portions and decrease her carbohydrates at breakfast, and continue to add daily activity as she is able.     PLAN  Meal Plan Recommendation: continue to eat 3 meals a day, have small snacks between meals, if needed, use portion control, use plate planning method, and incorporate some protein to each meal and your evening snack.  -watch your carbohydrate portion at breakfast  Exercise / activity plan: continue to work on moving your body throughout the day  Checking blood sugars once a week, test before and 2 hours after a meal, rotate between breakfast and dinner.     Schedule your eye exam  Schedule your dental exam    Follow-up: yearly or sooner if needed    See Care Plan for co-developed, patient-state behavior change goals.  AVS provided for patient today.    Education Materials Provided:  No new materials provided today      SUBJECTIVE/OBJECTIVE:  Presents for: Individual  review  Accompanied by: Self, Daughter (Martha)  Diabetes education in the past 24mo: No  Focus of Visit: CGM  Diabetes type: Type 2  Disease course: Worsening  How confident are you filling out medical forms by yourself:: Extremely  Diabetes management related comments/concerns: hunger pains in overnight hours_refocus my goals  Transportation concerns: No  Difficulty affording diabetes medication?: No  Difficulty affording diabetes testing supplies?: No  Other concerns:: None  Cultural Influences/Ethnic Background:  Not  or     Diabetes Symptoms & Complications:  Fatigue: Yes  Neuropathy: No  Polydipsia: Sometimes  Polyphagia: Sometimes  Polyuria: Yes  Visual change: Yes  Slow healing wounds: Sometimes  Complications assessed today?: Yes  Autonomic neuropathy: No  CVA: No  Heart disease: No  Nephropathy: Other  Peripheral neuropathy: No  Peripheral Vascular Disease: No  Retinopathy: No  Sexual dysfunction: No    Patient Problem List and Family Medical History reviewed for relevant medical history, current medical status, and diabetes risk factors.    Vitals:  There were no vitals taken for this visit.  Estimated body mass index is 63.88 kg/m  as calculated from the following:    Height as of 10/4/22: 1.524 m (5').    Weight as of 10/10/22: 148.4 kg (327 lb 1.6 oz).   Last 3 BP:   BP Readings from Last 3 Encounters:   10/10/22 128/72   10/04/22 126/84   09/30/22 124/82       History   Smoking Status     Never   Smokeless Tobacco     Never       Labs:  Lab Results   Component Value Date    A1C 7.9 09/30/2022     Lab Results   Component Value Date     10/04/2022     Lab Results   Component Value Date     09/09/2021    LDL 76 05/07/2016     HDL Cholesterol   Date Value Ref Range Status   09/09/2021 52 40 - 150 mg/dL Final   ]  GFR Estimate   Date Value Ref Range Status   03/09/2020 57 (L) >60 mL/min/[1.73_m2] Final     Comment:     Non  GFR Calc  Starting 12/18/2018, serum  creatinine based estimated GFR (eGFR) will be   calculated using the Chronic Kidney Disease Epidemiology Collaboration   (CKD-EPI) equation.       GFR Estimate If Black   Date Value Ref Range Status   03/09/2020 66 >60 mL/min/[1.73_m2] Final     Comment:      GFR Calc  Starting 12/18/2018, serum creatinine based estimated GFR (eGFR) will be   calculated using the Chronic Kidney Disease Epidemiology Collaboration   (CKD-EPI) equation.       Lab Results   Component Value Date    CR 1.16 10/04/2022     No results found for: MICROALBUMIN    Healthy Eating:  Healthy Eating Assessed Today: Yes  Cultural/Pentecostal diet restrictions?: No  Meal planning/habits: Avoiding sweets, Calorie counting, Carb counting, Low carb, Heart healthy, Low salt, Smaller portions, Plate planning method, Keeps food records, Frequent snacking  Breakfast: banana, turkey, laughing cow wedge Or banana, oatmeal, with baked apples, greek yourt with raspberries, rice cake  Lunch: bell peppers, pear, 1/2 plum, orzo wiht sun dried tomatoes and green beans Or soup and sanad  Dinner: trell slider, cheesy potatoes,sliced tomato  OR low carb tortilla, with peppers, onionns, tomatoes, sour cream, onion rings  Snacks: crackers, skinny popcorn, pretzels crisps  Beverages: Water, Coffee, Tea, Diet soda  Has patient met with a dietitian in the past?: Yes    Being Active:  Being Active Assessed Today: Yes  Exercise:: Unable to exercise (physical limitations- does walk around the house and do house chores)  Barrier to exercise: Time, Access, Physical limitation    Monitoring:  Monitoring Assessed Today: Yes  Did patient bring glucose meter to appointment? : No  Blood Glucose Meter: Accu-chek  Times checking blood sugar at home (number): 2  Times checking blood sugar at home (per): Day  Blood glucose trend: Increasing    Taking Medications:  Diabetes Medication(s)     Biguanides       metFORMIN (GLUCOPHAGE XR) 500 MG 24 hr tablet    Take 2 tablets  (1,000 mg) by mouth 2 times daily (with meals)          Taking Medication Assessed Today: Yes  Current Treatments: Oral Medication (taken by mouth)  Problems taking diabetes medications regularly?: No  Diabetes medication side effects?: No    Problem Solving:  Problem Solving Assessed Today: Yes  Is the patient at risk for hypoglycemia?: No    Reducing Risks:  Reducing Risks Assessed Today: Yes  Diabetes Risks: Age over 45 years, Sedentary Lifestyle  CAD Risks: Diabetes Mellitus, Dyslipidemia, Family history, Hypertension, Obesity, Post-menopausal, Sedentary lifestyle  Has dilated eye exam at least once a year?: No  Sees dentist every 6 months?: No  Feet checked by healthcare provider in the last year?: Yes    Healthy Coping:  Healthy Coping Assessed Today: Yes  Emotional response to diabetes: Ready to learn  Informal Support system:: Children, Family, Friends, Parent, Spouse  Stage of change: ACTION (Actively working towards change)  Patient Activation Measure Survey Score:  MAURICE Score (Last Two) 9/9/2015   MAURICE Raw Score 46   Activation Score 75.3   MAURICE Level 4         Care Plan and Education Provided:  Care Plan: Diabetes   Updates made by Meme Murray RN since 11/2/2022 12:00 AM      Problem: Diabetes Self-Management Education Needed to Optimize Self-Care Behaviors       Goal: Healthy Eating - follow a healthy eating pattern for diabetes       Task: Provide education on portion control and consistency in amount, composition and timing of food intake Completed 11/2/2022   Responsible User: Meme Murray RN      Task: Provide education on weight management Completed 11/2/2022   Responsible User: Meme Murray RN      Task: Provide education on heart healthy eating    Responsible User: Meme Murray RN      Task: Provide education on eating out    Responsible User: Meme Murray RN      Task: Develop individualized healthy eating plan with patient    Responsible User: Meme Murray RN       Goal: Taking Medication - patient is consistently taking medications as directed       Task: Provide education on action of prescribed medication, including when to take and possible side effects Completed 11/2/2022   Responsible User: Meme Murray RN      Goal: Problem Solving - know how to prevent and manage short-term diabetes complications       Task: Provide education on high blood glucose - causes, signs/symptoms, prevention and treatment Completed 11/2/2022   Responsible User: Meme Murray RN      Task: Provide education on low blood glucose - causes, signs/symptoms, prevention, treatment, carrying a carbohydrate source at all times, and medical identification Completed 11/2/2022   Responsible User: Meme Murray RN      Task: Provide education on when to call a health care provider Completed 11/2/2022   Responsible User: Meme Murray RN      Goal: Reducing Risks - know how to prevent and treat long-term diabetes complications       Task: Provide education on major complications of diabetes, prevention, early diagnostic measures and treatment of complications Completed 11/2/2022   Responsible User: Meme Murray RN      Task: Provide education on recommended care for dental, eye and foot health Completed 11/2/2022   Responsible User: Meme Murray RN      Task: Provide education on Hemoglobin A1c - goals and relationship to blood glucose levels Completed 11/2/2022   Responsible User: Meme Murray RN      Goal: Healthy Coping - use available resources to cope with the challenges of managing diabetes       Task: Discuss recognizing feelings about having diabetes Completed 11/2/2022   Responsible User: Meme Murray RN      Task: Provide education on the benefits of making appropriate lifestyle changes Completed 11/2/2022   Responsible User: Meme Murray RN Vickie Baeyen BSN, RN, [facility]     Time Spent: 45 minutes  Encounter Type: Individual    Any diabetes  medication dose changes were made via the CDE Protocol per the patient's referring provider. A copy of this encounter was shared with the provider.

## 2022-11-02 NOTE — PATIENT INSTRUCTIONS
Care Plan:  Meal Plan Recommendation: continue to eat 3 meals a day, have small snacks between meals, if needed, use portion control, use plate planning method, and incorporate some protein to each meal and your evening snack.  -watch your carbohydrate portion at breakfast  Exercise / activity plan: continue to work on moving your body throughout the day  Checking blood sugars once a week, test before and 2 hours after a meal, rotate between breakfast and dinner.     Schedule your eye exam  Schedule your dental exam     Follow up:  Follow-up diabetes education yearly or sooner as needed.     Bring blood glucose meter and logbook with you to all doctor and follow-up appointments.     [facility] for the [facility]:  For Your Diabetes or Nutrition Education Appointments Call:  [phone]   For Diabetes or Nutrition Related Questions:   [phone]  Send AURSOS Message   If you need a medication refill please contact your pharmacy. Please allow 3 business days for your refills to be completed.

## 2023-01-23 ENCOUNTER — TELEPHONE (OUTPATIENT)
Dept: PULMONOLOGY | Facility: CLINIC | Age: 58
End: 2023-01-23
Payer: COMMERCIAL

## 2023-01-23 NOTE — TELEPHONE ENCOUNTER
"Phone call from Elyssa. States that her  tested POS for COVID-19.  He has been isolating. She has been testing daily and has been NEG so far.  Feels good.  Will let us know if any symptoms appear, but so far feels \"normal\".    She wanted Dr. Villalobos to know that she also just got steroid injections in her hands last week.  She will continue to test daily and if POS will let us know or if she starts to have any symptoms or decrease in her oxygen sats.  "

## 2023-01-26 ENCOUNTER — DOCUMENTATION ONLY (OUTPATIENT)
Dept: PULMONOLOGY | Facility: CLINIC | Age: 58
End: 2023-01-26
Payer: COMMERCIAL

## 2023-01-26 ENCOUNTER — TELEPHONE (OUTPATIENT)
Dept: PULMONOLOGY | Facility: CLINIC | Age: 58
End: 2023-01-26
Payer: COMMERCIAL

## 2023-01-26 DIAGNOSIS — J45.20 MILD INTERMITTENT ASTHMA WITHOUT COMPLICATION: Primary | ICD-10-CM

## 2023-01-26 NOTE — PROGRESS NOTES
Orders for home concentrator faxed to [facility].  ATTN:  [employer].   Fax:  [phone]     Patient wanting to rent home concentrator for trip to [place] early February.

## 2023-01-26 NOTE — TELEPHONE ENCOUNTER
Phone call from Elyssa. States that her daughter has now tested POS for COVID, but they have been isolating.   She is continuing to test NEG and feels fine.       Requesting that Dr. Villalobos place orders for an in-home concentrator for her. She is going to [place] first week in Feb and needs to rent a machine there.  They need an order faxed to them.  States that she is using 2 LPM, but has orders to increase to 5 LPM if needed.

## 2023-02-12 ENCOUNTER — HEALTH MAINTENANCE LETTER (OUTPATIENT)
Age: 58
End: 2023-02-12

## 2023-03-25 ENCOUNTER — HEALTH MAINTENANCE LETTER (OUTPATIENT)
Age: 58
End: 2023-03-25

## 2023-04-28 ENCOUNTER — OFFICE VISIT (OUTPATIENT)
Dept: FAMILY MEDICINE | Facility: CLINIC | Age: 58
End: 2023-04-28

## 2023-04-28 VITALS
DIASTOLIC BLOOD PRESSURE: 74 MMHG | HEIGHT: 60 IN | SYSTOLIC BLOOD PRESSURE: 128 MMHG | WEIGHT: 293 LBS | TEMPERATURE: 97.8 F | BODY MASS INDEX: 57.52 KG/M2 | OXYGEN SATURATION: 98 % | HEART RATE: 92 BPM

## 2023-04-28 DIAGNOSIS — I10 ESSENTIAL HYPERTENSION, BENIGN: ICD-10-CM

## 2023-04-28 DIAGNOSIS — D50.0 IRON DEFICIENCY ANEMIA DUE TO CHRONIC BLOOD LOSS: ICD-10-CM

## 2023-04-28 DIAGNOSIS — J30.1 CHRONIC SEASONAL ALLERGIC RHINITIS DUE TO POLLEN: ICD-10-CM

## 2023-04-28 DIAGNOSIS — E87.6 HYPOKALEMIA: ICD-10-CM

## 2023-04-28 DIAGNOSIS — E66.01 MORBID OBESITY WITH BMI OF 60.0-69.9, ADULT (H): ICD-10-CM

## 2023-04-28 DIAGNOSIS — E11.8 DIABETES MELLITUS TYPE 2 WITH COMPLICATIONS (H): ICD-10-CM

## 2023-04-28 DIAGNOSIS — F32.5 MAJOR DEPRESSIVE DISORDER WITH SINGLE EPISODE, IN FULL REMISSION (H): ICD-10-CM

## 2023-04-28 DIAGNOSIS — K21.9 GASTROESOPHAGEAL REFLUX DISEASE WITHOUT ESOPHAGITIS: ICD-10-CM

## 2023-04-28 LAB
ALBUMIN (URINE) MG/L: 30
ALBUMIN SERPL-MCNC: 3.7 G/DL (ref 3.6–5.1)
ALBUMIN URINE MG/G CR: <30 MG/G CREATININE
ALBUMIN/GLOB SERPL: 0.8 {RATIO}
ALP SERPL-CCNC: 58 U/L (ref 33–130)
ALT 1742-6: 28 U/L (ref 0–32)
AST 1920-8: 39 U/L (ref 0–35)
BILIRUB SERPL-MCNC: 0.7 MG/DL (ref 0.2–1.2)
BUN SERPL-MCNC: 20 MG/DL (ref 7–25)
BUN/CREATININE RATIO: 16.9 (ref 6–32)
CALCIUM SERPL-MCNC: 9.6 MG/DL (ref 8.6–10.3)
CHLORIDE SERPLBLD-SCNC: 99.5 MMOL/L (ref 98–110)
CO2 SERPL-SCNC: 31.1 MMOL/L (ref 20–32)
CREAT SERPL-MCNC: 1.18 MG/DL (ref 0.6–1.3)
CREATININE URINE MG/DL: 200 MG/DL
GLOBULIN, CALCULATED - QUEST: 4.5
GLUCOSE SERPL-MCNC: 139 MG/DL (ref 60–99)
HBA1C MFR BLD: 8.4 % (ref 4–7)
POTASSIUM SERPL-SCNC: 3.99 MMOL/L (ref 3.5–5.3)
PROT SERPL-MCNC: 8.2 G/DL (ref 6.1–8.1)
SODIUM SERPL-SCNC: 139.1 MMOL/L (ref 135–146)

## 2023-04-28 PROCEDURE — 83036 HEMOGLOBIN GLYCOSYLATED A1C: CPT | Performed by: FAMILY MEDICINE

## 2023-04-28 PROCEDURE — 99214 OFFICE O/P EST MOD 30 MIN: CPT | Performed by: FAMILY MEDICINE

## 2023-04-28 PROCEDURE — 82570 ASSAY OF URINE CREATININE: CPT | Performed by: FAMILY MEDICINE

## 2023-04-28 PROCEDURE — 80053 COMPREHEN METABOLIC PANEL: CPT | Performed by: FAMILY MEDICINE

## 2023-04-28 PROCEDURE — 36415 COLL VENOUS BLD VENIPUNCTURE: CPT | Performed by: FAMILY MEDICINE

## 2023-04-28 PROCEDURE — 82043 UR ALBUMIN QUANTITATIVE: CPT | Performed by: FAMILY MEDICINE

## 2023-04-28 RX ORDER — METFORMIN HCL 500 MG
1000 TABLET, EXTENDED RELEASE 24 HR ORAL 2 TIMES DAILY WITH MEALS
Qty: 360 TABLET | Refills: 1 | Status: SHIPPED | OUTPATIENT
Start: 2023-04-28 | End: 2023-11-03

## 2023-04-28 RX ORDER — SERTRALINE HYDROCHLORIDE 100 MG/1
TABLET, FILM COATED ORAL
Qty: 90 TABLET | Refills: 1 | Status: SHIPPED | OUTPATIENT
Start: 2023-04-28 | End: 2023-11-03

## 2023-04-28 RX ORDER — LOSARTAN POTASSIUM 50 MG/1
50 TABLET ORAL DAILY
Qty: 90 TABLET | Refills: 1 | Status: SHIPPED | OUTPATIENT
Start: 2023-04-28 | End: 2023-11-03

## 2023-04-28 RX ORDER — POTASSIUM CHLORIDE 750 MG/1
10 TABLET, EXTENDED RELEASE ORAL DAILY
Qty: 90 TABLET | Refills: 1 | Status: SHIPPED | OUTPATIENT
Start: 2023-04-28 | End: 2023-11-03

## 2023-04-28 RX ORDER — CETIRIZINE HYDROCHLORIDE 10 MG/1
TABLET ORAL
Qty: 90 TABLET | Refills: 1 | Status: SHIPPED | OUTPATIENT
Start: 2023-04-28 | End: 2023-11-03

## 2023-04-28 ASSESSMENT — ANXIETY QUESTIONNAIRES
3. WORRYING TOO MUCH ABOUT DIFFERENT THINGS: SEVERAL DAYS
2. NOT BEING ABLE TO STOP OR CONTROL WORRYING: NOT AT ALL
6. BECOMING EASILY ANNOYED OR IRRITABLE: SEVERAL DAYS
GAD7 TOTAL SCORE: 4
GAD7 TOTAL SCORE: 4
5. BEING SO RESTLESS THAT IT IS HARD TO SIT STILL: SEVERAL DAYS
IF YOU CHECKED OFF ANY PROBLEMS ON THIS QUESTIONNAIRE, HOW DIFFICULT HAVE THESE PROBLEMS MADE IT FOR YOU TO DO YOUR WORK, TAKE CARE OF THINGS AT HOME, OR GET ALONG WITH OTHER PEOPLE: NOT DIFFICULT AT ALL
7. FEELING AFRAID AS IF SOMETHING AWFUL MIGHT HAPPEN: NOT AT ALL
1. FEELING NERVOUS, ANXIOUS, OR ON EDGE: SEVERAL DAYS

## 2023-04-28 ASSESSMENT — PATIENT HEALTH QUESTIONNAIRE - PHQ9
SUM OF ALL RESPONSES TO PHQ QUESTIONS 1-9: 4
5. POOR APPETITE OR OVEREATING: NOT AT ALL

## 2023-04-28 NOTE — NURSING NOTE
Chief Complaint   Patient presents with     Recheck Medication     Non-fasting today, refill medications     Pre-visit Screening:  Immunizations:  up to date  Colonoscopy:  is up to date  Mammogram: is up to date  Asthma Action Test/Plan:  NA  PHQ9:  NA  GAD7:  NA  Questioned patient about current smoking habits Pt. has never smoked.  Ok to leave detailed message on voice mail for today's visit only Yes, phone # [phone]

## 2023-04-28 NOTE — PROGRESS NOTES
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)        Relevant Medications    losartan (COZAAR) 50 MG tablet    metFORMIN (GLUCOPHAGE XR) 500 MG 24 hr tablet    Other Relevant Orders    HEMOGLOBIN A1C (BFP) (Completed)    VENOUS COLLECTION (Completed)    ALBUMIN RANDOM URINE QUANTITATIVE (BFP) (Completed)    FOOT EXAM  NO CHARGE [03775.114] (Completed)    Comprehensive Metobolic Panel (BFP) (Completed)    Hypokalemia        Relevant Medications    potassium chloride ER (KLOR-CON M) 10 MEQ CR tablet    Major depressive disorder with single episode, in full remission (H)        Relevant Medications    sertraline (ZOLOFT) 100 MG tablet        1. Gastroesophageal reflux disease without esophagitis      2. Iron deficiency anemia due to chronic blood loss      3. Chronic seasonal allergic rhinitis due to pollen  Refilled.  - cetirizine (ZYRTEC) 10 MG tablet; TAKE 1 TABLET BY MOUTH ONE TIME DAILY  Dispense: 90 tablet; Refill: 1    4. Essential hypertension, benign  Controlled, refilled.  - losartan (COZAAR) 50 MG tablet; Take 1 tablet (50 mg) by mouth daily HOLD if systolic Blood pressure is under 115  Dispense: 90 tablet; Refill: 1    5. 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 Discussed diet and exercise. We should add a diabetes medication but she wants to work on diet and exercise first. I told her if this is still high in next check in 6 months, we would add a medication.  She is working with diabetes education.  - HEMOGLOBIN A1C (BFP)  - VENOUS COLLECTION  - ALBUMIN RANDOM URINE QUANTITATIVE (BFP)  - FOOT EXAM  NO CHARGE [08826.114]  - losartan (COZAAR) 50 MG tablet; Take 1 tablet (50 mg) by mouth daily HOLD if systolic Blood pressure is under 115  Dispense: 90 tablet; Refill: 1  - metFORMIN (GLUCOPHAGE XR) 500 MG 24 hr tablet; Take 2 tablets (1,000 mg) by mouth 2 times daily (with meals)  Dispense: 360 tablet; Refill: 1  - Comprehensive Metobolic Panel (BFP)    6. Morbid obesity with BMI of 60.0-69.9, adult (H)    - metFORMIN (GLUCOPHAGE XR) 500 MG 24 hr tablet; Take 2 tablets (1,000 mg) by mouth 2 times daily (with meals)  Dispense: 360 tablet; Refill: 1    7. Hypokalemia    - potassium chloride ER (KLOR-CON M) 10 MEQ CR tablet; Take 1 tablet (10 mEq) by mouth daily  Dispense: 90 tablet; Refill: 1    8. Major depressive disorder with single episode, in full remission (H)  Controlled, refilled.  - sertraline (ZOLOFT) 100 MG tablet; TAKE 1 TABLET BY MOUTH ONE TIME DAILY  Dispense: 90 tablet; Refill: 1              BMI:   Estimated body mass index is 62.53 kg/m  as calculated from the following:    Height as of this encounter: 1.524 m (5').    Weight as of this encounter: 145.2 kg (320 lb 3.2 oz).         FUTURE APPOINTMENTS:       - Follow-up visit in 6 months.    No follow-ups on file.    Noelle Garcia MD  [facility]    Subjective     Nursing Notes:   Sandi Boland CMA  4/28/2023 11:40 AM  Signed  Chief Complaint   Patient presents with     Recheck Medication     Non-fasting today, refill medications     Pre-visit Screening:  Immunizations:  up to date  Colonoscopy:  is up to date  Mammogram: is up to date  Asthma Action Test/Plan:  NA  PHQ9:  NA  GAD7:  NA  Questioned patient about current smoking habits Pt. has never smoked.  Ok to leave detailed message on voice mail for today's visit only Yes, phone # [phone]           Mirta Decker is a 58 year  old female who presents to clinic today for the following health issues a  HPI     Here with daughter.  Is due for labs and medications. Diabetes, blood pressure. She was very stressed, her  has been sick. She has been working on diet and exercise, weight loss. Thinks her blood sugars are better in the past few weeks. Blood sugar up to 180, 300. Has been to see diabetes ed, last time was about a couple of months ago.  This morning blood sugar 140 mostly below 180.        Review of Systems   Constitutional, HEENT, cardiovascular, pulmonary, gi and gu systems are negative, except as otherwise noted.      Objective    /74 (BP Location: Left arm, Patient Position: Sitting, Cuff Size: Adult Regular)   Pulse 92   Temp 97.8  F (36.6  C) (Temporal)   Ht 1.524 m (5')   Wt 145.2 kg (320 lb 3.2 oz)   SpO2 98%   BMI 62.53 kg/m    Body mass index is 62.53 kg/m .  Physical Exam   GENERAL: healthy, alert and no distress  RESP: lungs clear to auscultation - no rales, rhonchi or wheezes  CV: regular rate and rhythm, normal S1 S2, no S3 or S4, no murmur, click or rub, no peripheral edema and peripheral pulses strong  MS: no gross musculoskeletal defects noted, no edema  NEURO: Normal strength and tone, mentation intact and speech normal  PSYCH: mentation appears normal, affect normal/bright    Results for orders placed or performed in visit on 04/28/23   HEMOGLOBIN A1C (BFP)     Status: Abnormal   Result Value Ref Range    Hemoglobin A1C 8.4 (A) 4.0 - 7.0 %   ALBUMIN RANDOM URINE QUANTITATIVE (BFP)     Status: None   Result Value Ref Range    Albumin mg/L 30 <30    Creatinine Urine mg/dL 200 <30 mg/dL    Albumin Urine mg/g Cr <30 <30 MG/G Creatinine   Comprehensive Metobolic Panel (BFP)     Status: Abnormal   Result Value Ref Range    Carbon Dioxide 31.1 20 - 32 mmol/L    Creatinine 1.18 0.60 - 1.30 mg/dL    Glucose 139 (A) 60 - 99 mg/dL    Sodium 139.1 135 - 146 mmol/L    Potassium 3.99 3.5 - 5.3 mmol/L     Chloride 99.5 98 - 110 mmol/L    Protein Total 8.2 (A) 6.1 - 8.1 g/dL    Albumin 3.7 3.6 - 5.1 g/dL    Alkaline Phosphatase 58 33 - 130 U/L    ALT 28 0 - 32 U/L    AST 39 (A) 0 - 35 U/L    Bilirubin Total 0.7 0.2 - 1.2 mg/dL    Urea Nitrogen 20 7 - 25 mg/dL    Calcium 9.6 8.6 - 10.3 mg/dL    BUN/Creatinine Ratio 16.9 6 - 32    Globulin Calculated 4.5     A/G Ratio 0.8

## 2023-06-02 ENCOUNTER — MEDICAL CORRESPONDENCE (OUTPATIENT)
Dept: HEALTH INFORMATION MANAGEMENT | Facility: CLINIC | Age: 58
End: 2023-06-02
Payer: COMMERCIAL

## 2023-06-06 ENCOUNTER — MEDICAL CORRESPONDENCE (OUTPATIENT)
Dept: HEALTH INFORMATION MANAGEMENT | Facility: CLINIC | Age: 58
End: 2023-06-06
Payer: COMMERCIAL

## 2023-06-19 ENCOUNTER — MEDICAL CORRESPONDENCE (OUTPATIENT)
Dept: HEALTH INFORMATION MANAGEMENT | Facility: CLINIC | Age: 58
End: 2023-06-19

## 2023-08-19 ENCOUNTER — HEALTH MAINTENANCE LETTER (OUTPATIENT)
Age: 58
End: 2023-08-19

## 2023-10-09 ENCOUNTER — OFFICE VISIT (OUTPATIENT)
Dept: SLEEP MEDICINE | Facility: CLINIC | Age: 58
End: 2023-10-09
Payer: COMMERCIAL

## 2023-10-09 VITALS
DIASTOLIC BLOOD PRESSURE: 79 MMHG | BODY MASS INDEX: 62.57 KG/M2 | WEIGHT: 293 LBS | HEART RATE: 90 BPM | SYSTOLIC BLOOD PRESSURE: 131 MMHG

## 2023-10-09 DIAGNOSIS — G47.30 OBESITY WITH SLEEP APNEA: ICD-10-CM

## 2023-10-09 DIAGNOSIS — E66.9 OBESITY WITH SLEEP APNEA: ICD-10-CM

## 2023-10-09 DIAGNOSIS — G47.33 OSA TREATED WITH BIPAP: Primary | ICD-10-CM

## 2023-10-09 DIAGNOSIS — Z99.81 HYPOXEMIA REQUIRING SUPPLEMENTAL OXYGEN: ICD-10-CM

## 2023-10-09 DIAGNOSIS — R09.02 HYPOXEMIA REQUIRING SUPPLEMENTAL OXYGEN: ICD-10-CM

## 2023-10-09 PROCEDURE — 99213 OFFICE O/P EST LOW 20 MIN: CPT | Performed by: INTERNAL MEDICINE
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
## 2023-10-09 NOTE — NURSING NOTE
Chief Complaint   Patient presents with    Sleep Problem     CPAP follow up       Initial BP (!) 146/81   Pulse 90   Wt 145.3 kg (320 lb 6.4 oz)   BMI 62.57 kg/m   Estimated body mass index is 62.57 kg/m  as calculated from the following:    Height as of 4/28/23: 1.524 m (5').    Weight as of this encounter: 145.3 kg (320 lb 6.4 oz).    Medication Reconciliation: complete    DME: [facility]    ESS 9    BENITO 3    Dakotah Garcia CMA (AAMA)

## 2023-10-11 NOTE — PROGRESS NOTES
Assessment & Plan   Problem List Items Addressed This Visit          Digestive    Morbid obesity with BMI of 60.0-69.9, adult (H)    Relevant Medications    metFORMIN (GLUCOPHAGE XR) 500 MG 24 hr tablet       Endocrine    Type 2 diabetes mellitus without complication, without long-term current use of insulin (H)    Relevant Medications    blood glucose (ACCU-CHEK GUIDE) test strip    losartan (COZAAR) 50 MG tablet    metFORMIN (GLUCOPHAGE XR) 500 MG 24 hr tablet    Other Relevant Orders    HEMOGLOBIN A1C (BFP) (Completed)    VENOUS COLLECTION (Completed)       Circulatory    Essential hypertension, benign    Relevant Medications    losartan (COZAAR) 50 MG tablet    (HFpEF) heart failure with preserved ejection fraction (H)    Relevant Orders    Adult Cardiology Wendi Crain Referral - To [facility] Location       Hematologic    Anemia    Relevant Medications    famotidine (PEPCID) 20 MG tablet    ferrous sulfate (FEROSUL) 325 (65 Fe) MG tablet     Other Visit Diagnoses       Encounter for vaccination    -  Primary    Relevant Orders    FLU VAC PRESRV FREE QUAD SPLIT VIR 3+YRS IM (Completed)    Chronic seasonal allergic rhinitis due to pollen        Relevant Medications    cetirizine (ZYRTEC) 10 MG tablet    fluticasone (FLONASE) 50 MCG/ACT nasal spray    Gastroesophageal reflux disease without esophagitis        Relevant Medications    famotidine (PEPCID) 20 MG tablet    ferrous sulfate (FEROSUL) 325 (65 Fe) MG tablet    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)        Relevant Medications    blood glucose (ACCU-CHEK GUIDE) test strip    losartan (COZAAR) 50 MG tablet    metFORMIN (GLUCOPHAGE XR) 500 MG 24 hr tablet    Other Relevant Orders    Lipid Panel (BFP)    Comprehensive Metobolic Panel (BFP)    HEMOGRAM PLATELET DIFF (BFP) (Completed)    Hypokalemia        Relevant Medications    potassium chloride ER (KLOR-CON M) 10 MEQ CR tablet    Major depressive disorder with single episode, in full  remission (H24)        Relevant Medications    sertraline (ZOLOFT) 100 MG tablet    Tinea corporis        Relevant Medications    cetirizine (ZYRTEC) 10 MG tablet    fluticasone (FLONASE) 50 MCG/ACT nasal spray    nystatin (MYCOSTATIN) 621919 UNIT/GM external powder    Initial Medicare annual wellness visit               1. Type 2 diabetes mellitus without complication, without long-term current use of insulin (H)  Check labs. Improved from last time. Continue current medications.  - HEMOGLOBIN A1C (BFP)  - VENOUS COLLECTION  - blood glucose (ACCU-CHEK GUIDE) test strip; Use to test blood sugar 3 times daily or as directed.  Dispense: 100 strip; Refill: 3    2. Chronic seasonal allergic rhinitis due to pollen  Refilled.  - cetirizine (ZYRTEC) 10 MG tablet; TAKE 1 TABLET BY MOUTH ONE TIME DAILY  Dispense: 90 tablet; Refill: 1  - fluticasone (FLONASE) 50 MCG/ACT nasal spray; Spray 1 spray into both nostrils daily  Dispense: 16 g; Refill: 5    3. Gastroesophageal reflux disease without esophagitis  Refilled.  - famotidine (PEPCID) 20 MG tablet; Take 1 tablet (20 mg) by mouth 2 times daily  Dispense: 180 tablet; Refill: 1  - ferrous sulfate (FEROSUL) 325 (65 Fe) MG tablet; Take 1 tablet (325 mg) by mouth daily  Dispense: 90 tablet; Refill: 1    4. Iron deficiency anemia due to chronic blood loss  Refilled. Check cbc.  - famotidine (PEPCID) 20 MG tablet; Take 1 tablet (20 mg) by mouth 2 times daily  Dispense: 180 tablet; Refill: 1  - ferrous sulfate (FEROSUL) 325 (65 Fe) MG tablet; Take 1 tablet (325 mg) by mouth daily  Dispense: 90 tablet; Refill: 1    5. Essential hypertension, benign  Controlled on medications, refilled.  - losartan (COZAAR) 50 MG tablet; Take 1 tablet (50 mg) by mouth daily HOLD if systolic Blood pressure is under 115  Dispense: 90 tablet; Refill: 1    6. 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 Morbid obesity with BMI of 60.0-69.9, adult (H)    - metFORMIN (GLUCOPHAGE XR) 500 MG 24 hr tablet; Take 2 tablets (1,000 mg) by mouth 2 times daily (with meals)  Dispense: 360 tablet; Refill: 1    8. Hypokalemia  Refilled.  - potassium chloride ER (KLOR-CON M) 10 MEQ CR tablet; Take 1 tablet (10 mEq) by mouth daily  Dispense: 90 tablet; Refill: 1    9. Major depressive disorder with single episode, in full remission (H24)  Symptoms are controlled, refilled.  - sertraline (ZOLOFT) 100 MG tablet; TAKE 1 TABLET BY MOUTH ONE TIME DAILY  Dispense: 90 tablet; Refill: 1    10. Tinea corporis  Refilled.  - nystatin (MYCOSTATIN) 406335 UNIT/GM external powder; Apply topically 2 times daily as needed for other (fungal) , under breasts  Dispense: 60 g; Refill: 1    11. Encounter for vaccination    - FLU VAC PRESRV FREE QUAD SPLIT VIR 3+YRS IM    12. Chronic heart failure with preserved ejection fraction (H)  She hasn't seen cardiology, unclear why she has this as a dx. I will refer for evaluation to cardiology.  - Adult Cardiology Wendi Crain Referral - To [facility] Location; Future    13. Initial Medicare annual wellness visit  Completed.            BMI:   Estimated body mass index is 61.32 kg/m  as calculated from the following:    Height as of this encounter: 1.524 m (5').    Weight as of this encounter: 142.4 kg (314 lb).         FUTURE APPOINTMENTS:       - Follow-up visit in 6 months.    No follow-ups on file.    Noelle Garcia MD  [facility]    Subjective     Nursing Notes:   Sandi Boland CMA  11/3/2023  9:49 AM  Signed  Chief Complaint   Patient presents with    Recheck Medication      Fasting today, refill medications      Pre-visit Screening:  Immunizations:  not up to date - shingrix at pharmacy  Colonoscopy:  is up to date  Mammogram: is up to date  Asthma Action Test/Plan:  LISA  PHQ9:  NA  GAD7:  NA  Questioned patient about current smoking habits Pt. has never smoked.  Ok to leave detailed message on voice mail for today's visit only Yes, phone # [phone]       Mirta Decker is a 58 year old female who presents to clinic today for the following health issues   HPI     Here with .  Due for refills on her medications, diabetes blood sugars better at home.    Fell more than a month ago, hit her head, no loc had a headache but this is gone now. Just got caught up in her tubing for the oxygen. Is feeling well now    Obesity--working on exercise. Has a bike.     Has dx in chart for heart failure, last echo in 2018. Showing pulmonary hypertension. She doesn't have a cardiologist.    Needs other meds refilled, is taking them daily. Trying to exercise. Potassium, GERD, nystatin, blood pressure. Sertraline is controlling her sx of depression/anxiety        Review of Systems   Constitutional, HEENT, cardiovascular, pulmonary, gi and gu systems are negative, except as otherwise noted.      Objective    /84 (BP Location: Right arm, Patient Position: Sitting, Cuff Size: Adult Large)   Pulse 98   Temp 98.8  F (37.1  C) (Temporal)   Ht 1.524 m (5')   Wt 142.4 kg (314 lb)   SpO2 96%   BMI 61.32 kg/m    Body mass index is 61.32 kg/m .  Physical Exam   GENERAL: healthy, alert and no distress  RESP: lungs clear to auscultation - no rales, rhonchi or wheezes  CV: regular rate and rhythm, normal S1 S2, no S3 or S4, no murmur, click or rub, no peripheral edema and peripheral pulses strong  MS: no gross musculoskeletal defects noted, no edema  NEURO: Normal strength and tone, mentation intact and speech normal  PSYCH: mentation appears normal, affect normal/bright  On oxygen    Results for  orders placed or performed in visit on 11/03/23   HEMOGLOBIN A1C (BFP)     Status: Abnormal   Result Value Ref Range    Hemoglobin A1C 7.8 (A) 4.0 - 7.0 %   HEMOGRAM PLATELET DIFF (BFP)     Status: Abnormal   Result Value Ref Range    WBC 6.5 4.0 - 11 10*9/L    RBC Count 3.99 3.8 - 5.2 10*12/L    Hemoglobin 11.7 11.7 - 15.7 g/dL    Hematocrit 37.9 35.0 - 47.0 %    MCV 94.9 78 - 100 fL    MCH 29.3 26 - 33 pg    MCHC 30.9 (A) 31 - 36 g/dL    Platelet Count 165 150 - 375 10^9/L    % Granulocytes 73.0 %    % Lymphocytes 23.4 %    % Monocytes 3.6 %
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
## 2023-10-17 ENCOUNTER — DOCUMENTATION ONLY (OUTPATIENT)
Dept: SLEEP MEDICINE | Facility: CLINIC | Age: 58
End: 2023-10-17
Payer: COMMERCIAL

## 2023-10-20 DIAGNOSIS — I50.32 CHRONIC HEART FAILURE WITH PRESERVED EJECTION FRACTION (H): ICD-10-CM

## 2023-10-20 DIAGNOSIS — J96.12 CHRONIC RESPIRATORY FAILURE WITH HYPOXIA AND HYPERCAPNIA (H): ICD-10-CM

## 2023-10-20 DIAGNOSIS — J96.11 CHRONIC RESPIRATORY FAILURE WITH HYPOXIA AND HYPERCAPNIA (H): ICD-10-CM

## 2023-10-23 RX ORDER — TORSEMIDE 20 MG/1
TABLET ORAL
Qty: 240 TABLET | Refills: 2 | Status: SHIPPED | OUTPATIENT
Start: 2023-10-23 | End: 2024-02-21

## 2023-11-03 ENCOUNTER — OFFICE VISIT (OUTPATIENT)
Dept: FAMILY MEDICINE | Facility: CLINIC | Age: 58
End: 2023-11-03

## 2023-11-03 VITALS
DIASTOLIC BLOOD PRESSURE: 84 MMHG | TEMPERATURE: 98.8 F | HEIGHT: 60 IN | OXYGEN SATURATION: 96 % | BODY MASS INDEX: 57.52 KG/M2 | HEART RATE: 98 BPM | SYSTOLIC BLOOD PRESSURE: 132 MMHG | WEIGHT: 293 LBS

## 2023-11-03 DIAGNOSIS — E11.8 DIABETES MELLITUS TYPE 2 WITH COMPLICATIONS (H): ICD-10-CM

## 2023-11-03 DIAGNOSIS — J30.1 CHRONIC SEASONAL ALLERGIC RHINITIS DUE TO POLLEN: ICD-10-CM

## 2023-11-03 DIAGNOSIS — Z23 ENCOUNTER FOR VACCINATION: Primary | ICD-10-CM

## 2023-11-03 DIAGNOSIS — F32.5 MAJOR DEPRESSIVE DISORDER WITH SINGLE EPISODE, IN FULL REMISSION (H): ICD-10-CM

## 2023-11-03 DIAGNOSIS — I10 ESSENTIAL HYPERTENSION, BENIGN: ICD-10-CM

## 2023-11-03 DIAGNOSIS — Z00.00 INITIAL MEDICARE ANNUAL WELLNESS VISIT: ICD-10-CM

## 2023-11-03 DIAGNOSIS — I50.32 CHRONIC HEART FAILURE WITH PRESERVED EJECTION FRACTION (H): ICD-10-CM

## 2023-11-03 DIAGNOSIS — E87.6 HYPOKALEMIA: ICD-10-CM

## 2023-11-03 DIAGNOSIS — B35.4 TINEA CORPORIS: ICD-10-CM

## 2023-11-03 DIAGNOSIS — K21.9 GASTROESOPHAGEAL REFLUX DISEASE WITHOUT ESOPHAGITIS: ICD-10-CM

## 2023-11-03 DIAGNOSIS — E66.01 MORBID OBESITY WITH BMI OF 60.0-69.9, ADULT (H): ICD-10-CM

## 2023-11-03 DIAGNOSIS — E11.9 TYPE 2 DIABETES MELLITUS WITHOUT COMPLICATION, WITHOUT LONG-TERM CURRENT USE OF INSULIN (H): ICD-10-CM

## 2023-11-03 DIAGNOSIS — D50.0 IRON DEFICIENCY ANEMIA DUE TO CHRONIC BLOOD LOSS: ICD-10-CM

## 2023-11-03 LAB
% GRANULOCYTES: 73 %
ALBUMIN SERPL-MCNC: 3.8 G/DL (ref 3.6–5.1)
ALBUMIN/GLOB SERPL: 0.9 {RATIO}
ALP SERPL-CCNC: 58 U/L (ref 33–130)
ALT 1742-6: 38 U/L (ref 0–32)
AST 1920-8: 56 U/L (ref 0–35)
BILIRUB SERPL-MCNC: 1.4 MG/DL (ref 0.2–1.2)
BUN SERPL-MCNC: 18 MG/DL (ref 7–25)
BUN/CREATININE RATIO: 16.1 (ref 6–32)
CALCIUM SERPL-MCNC: 9.6 MG/DL (ref 8.6–10.3)
CHLORIDE SERPLBLD-SCNC: 98.3 MMOL/L (ref 98–110)
CHOLEST SERPL-MCNC: 196 MG/DL (ref 0–199)
CHOLEST/HDLC SERPL: 4 {RATIO} (ref 0–5)
CO2 SERPL-SCNC: 29.7 MMOL/L (ref 20–32)
CREAT SERPL-MCNC: 1.12 MG/DL (ref 0.6–1.3)
GLOBULIN, CALCULATED - QUEST: 4.3
GLUCOSE SERPL-MCNC: 184 MG/DL (ref 60–99)
HBA1C MFR BLD: 7.8 % (ref 4–7)
HCT VFR BLD AUTO: 37.9 % (ref 35–47)
HDLC SERPL-MCNC: 53 MG/DL (ref 40–150)
HEMOGLOBIN: 11.7 G/DL (ref 11.7–15.7)
LDLC SERPL CALC-MCNC: 97 MG/DL (ref 0–130)
LYMPHOCYTES NFR BLD AUTO: 23.4 %
MCH RBC QN AUTO: 29.3 PG (ref 26–33)
MCHC RBC AUTO-ENTMCNC: 30.9 G/DL (ref 31–36)
MCV RBC AUTO: 94.9 FL (ref 78–100)
MONOCYTES NFR BLD AUTO: 3.6 %
PLATELET COUNT - QUEST: 165 10^9/L (ref 150–375)
POTASSIUM SERPL-SCNC: 3.59 MMOL/L (ref 3.5–5.3)
PROT SERPL-MCNC: 8.1 G/DL (ref 6.1–8.1)
RBC # BLD AUTO: 3.99 10*12/L (ref 3.8–5.2)
SODIUM SERPL-SCNC: 140.1 MMOL/L (ref 135–146)
TRIGL SERPL-MCNC: 229 MG/DL (ref 0–149)
WBC # BLD AUTO: 6.5 10*9/L (ref 4–11)

## 2023-11-03 PROCEDURE — 99214 OFFICE O/P EST MOD 30 MIN: CPT | Mod: 25 | Performed by: FAMILY MEDICINE

## 2023-11-03 PROCEDURE — 80053 COMPREHEN METABOLIC PANEL: CPT | Performed by: FAMILY MEDICINE

## 2023-11-03 PROCEDURE — 83036 HEMOGLOBIN GLYCOSYLATED A1C: CPT | Performed by: FAMILY MEDICINE

## 2023-11-03 PROCEDURE — 90686 IIV4 VACC NO PRSV 0.5 ML IM: CPT | Performed by: FAMILY MEDICINE

## 2023-11-03 PROCEDURE — 36415 COLL VENOUS BLD VENIPUNCTURE: CPT | Performed by: FAMILY MEDICINE

## 2023-11-03 PROCEDURE — G0438 PPPS, INITIAL VISIT: HCPCS | Performed by: FAMILY MEDICINE

## 2023-11-03 PROCEDURE — 80061 LIPID PANEL: CPT | Performed by: FAMILY MEDICINE

## 2023-11-03 PROCEDURE — 90471 IMMUNIZATION ADMIN: CPT | Performed by: FAMILY MEDICINE

## 2023-11-03 PROCEDURE — 85025 COMPLETE CBC W/AUTO DIFF WBC: CPT | Performed by: FAMILY MEDICINE

## 2023-11-03 RX ORDER — NYSTATIN 100000 [USP'U]/G
POWDER TOPICAL 2 TIMES DAILY PRN
Qty: 60 G | Refills: 1 | Status: SHIPPED | OUTPATIENT
Start: 2023-11-03 | End: 2024-05-07

## 2023-11-03 RX ORDER — SERTRALINE HYDROCHLORIDE 100 MG/1
TABLET, FILM COATED ORAL
Qty: 90 TABLET | Refills: 1 | Status: SHIPPED | OUTPATIENT
Start: 2023-11-03 | End: 2024-05-07

## 2023-11-03 RX ORDER — LOSARTAN POTASSIUM 50 MG/1
50 TABLET ORAL DAILY
Qty: 90 TABLET | Refills: 1 | Status: SHIPPED | OUTPATIENT
Start: 2023-11-03 | End: 2024-05-07

## 2023-11-03 RX ORDER — FLUTICASONE PROPIONATE 50 MCG
1 SPRAY, SUSPENSION (ML) NASAL DAILY
Qty: 16 G | Refills: 5 | Status: SHIPPED | OUTPATIENT
Start: 2023-11-03

## 2023-11-03 RX ORDER — CETIRIZINE HYDROCHLORIDE 10 MG/1
TABLET ORAL
Qty: 90 TABLET | Refills: 1 | Status: SHIPPED | OUTPATIENT
Start: 2023-11-03 | End: 2024-05-07

## 2023-11-03 RX ORDER — BLOOD SUGAR DIAGNOSTIC
STRIP MISCELLANEOUS
Qty: 100 STRIP | Refills: 3 | Status: SHIPPED | OUTPATIENT
Start: 2023-11-03

## 2023-11-03 RX ORDER — POTASSIUM CHLORIDE 750 MG/1
10 TABLET, EXTENDED RELEASE ORAL DAILY
Qty: 90 TABLET | Refills: 1 | Status: SHIPPED | OUTPATIENT
Start: 2023-11-03 | End: 2024-05-07

## 2023-11-03 RX ORDER — FERROUS SULFATE 325(65) MG
TABLET ORAL
Qty: 90 TABLET | Refills: 1 | Status: SHIPPED | OUTPATIENT
Start: 2023-11-03 | End: 2024-05-07

## 2023-11-03 RX ORDER — METFORMIN HCL 500 MG
1000 TABLET, EXTENDED RELEASE 24 HR ORAL 2 TIMES DAILY WITH MEALS
Qty: 360 TABLET | Refills: 1 | Status: SHIPPED | OUTPATIENT
Start: 2023-11-03 | End: 2024-05-07

## 2023-11-03 RX ORDER — FAMOTIDINE 20 MG/1
20 TABLET, FILM COATED ORAL 2 TIMES DAILY
Qty: 180 TABLET | Refills: 1 | Status: SHIPPED | OUTPATIENT
Start: 2023-11-03 | End: 2024-05-07
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
## 2023-11-03 NOTE — NURSING NOTE
Chief Complaint   Patient presents with    Recheck Medication     Fasting today, refill medications      Pre-visit Screening:  Immunizations:  not up to date - shingrix at pharmacy  Colonoscopy:  is up to date  Mammogram: is up to date  Asthma Action Test/Plan:  NA  PHQ9:  NA  GAD7:  NA  Questioned patient about current smoking habits Pt. has never smoked.  Ok to leave detailed message on voice mail for today's visit only Yes, phone # [phone]

## 2023-11-03 NOTE — PATIENT INSTRUCTIONS
Health Maintenance   Topic Date Due    EYE EXAM  Never done    Pneumococcal Vaccine: Pediatrics (0 to 5 Years) and At-Risk Patients (6 to 64 Years) (1 - PCV) Never done    MEDICARE ANNUAL WELLNESS VISIT  Never done    PAP  Never done    CBC  08/14/2021    LIPID  09/09/2022    ADVANCE CARE PLANNING  05/01/2023    INFLUENZA VACCINE (1) 09/01/2023    COVID-19 Vaccine (6 - 2023-24 season) 09/01/2023    BMP  10/28/2023    PHQ-9  10/28/2023    MAMMO SCREENING  04/28/2024 (Originally 7/27/2020)    COLORECTAL CANCER SCREENING  04/28/2024 (Originally 1965)    ZOSTER IMMUNIZATION (1 of 2) 11/03/2024 (Originally 1/7/2013)    RSV VACCINE (Pregnancy & 60+) (1 - 1-dose 60+ series) 11/03/2024 (Originally 1/7/2023)    HF ACTION PLAN  11/01/2025 (Originally 1965)    ASTHMA ACTION PLAN  11/01/2025 (Originally 3/23/2023)    ASTHMA CONTROL TEST  11/01/2025 (Originally 9/23/2022)    A1C  02/03/2024    MICROALBUMIN  04/28/2024    DIABETIC FOOT EXAM  04/28/2024    DTAP/TDAP/TD IMMUNIZATION (3 - Td or Tdap) 09/30/2032    TSH W/FREE T4 REFLEX  Completed    HEPATITIS C SCREENING  Completed    DEPRESSION ACTION PLAN  Completed    URINALYSIS  Completed    IPV IMMUNIZATION  Aged Out    HPV IMMUNIZATION  Aged Out    MENINGITIS IMMUNIZATION  Aged Out    RSV MONOCLONAL ANTIBODY  Aged Out    ALT  Discontinued    HIV SCREENING  Discontinued    HEMOGLOBIN  Discontinued

## 2023-11-29 ENCOUNTER — OFFICE VISIT (OUTPATIENT)
Dept: CARDIOLOGY | Facility: CLINIC | Age: 58
End: 2023-11-29
Attending: FAMILY MEDICINE
Payer: COMMERCIAL

## 2023-11-29 ENCOUNTER — TELEPHONE (OUTPATIENT)
Dept: FAMILY MEDICINE | Facility: CLINIC | Age: 58
End: 2023-11-29

## 2023-11-29 VITALS
HEART RATE: 104 BPM | BODY MASS INDEX: 57.52 KG/M2 | DIASTOLIC BLOOD PRESSURE: 85 MMHG | OXYGEN SATURATION: 98 % | SYSTOLIC BLOOD PRESSURE: 149 MMHG | HEIGHT: 60 IN | WEIGHT: 293 LBS

## 2023-11-29 DIAGNOSIS — I50.32 CHRONIC HEART FAILURE WITH PRESERVED EJECTION FRACTION (H): ICD-10-CM

## 2023-11-29 PROCEDURE — 99205 OFFICE O/P NEW HI 60 MIN: CPT | Performed by: INTERNAL MEDICINE

## 2023-11-29 PROCEDURE — 93000 ELECTROCARDIOGRAM COMPLETE: CPT | Performed by: INTERNAL MEDICINE
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
## 2023-11-29 NOTE — TELEPHONE ENCOUNTER
----- Message from Kyung Ireland MD sent at 11/29/2023  3:36 PM CST -----  Currently see my note on her.  I would recommend addition of SGLT2 inhibitors and evaluation for Ozempic.    Thank you. Kyung/ Cardiology

## 2024-01-23 ENCOUNTER — OFFICE VISIT (OUTPATIENT)
Dept: PULMONOLOGY | Facility: CLINIC | Age: 59
End: 2024-01-23
Payer: COMMERCIAL

## 2024-01-23 VITALS
SYSTOLIC BLOOD PRESSURE: 138 MMHG | DIASTOLIC BLOOD PRESSURE: 86 MMHG | HEART RATE: 98 BPM | WEIGHT: 293 LBS | BODY MASS INDEX: 61.52 KG/M2 | OXYGEN SATURATION: 93 %

## 2024-01-23 DIAGNOSIS — G47.33 OSA (OBSTRUCTIVE SLEEP APNEA): ICD-10-CM

## 2024-01-23 DIAGNOSIS — E66.2 OBESITY HYPOVENTILATION SYNDROME (H): ICD-10-CM

## 2024-01-23 DIAGNOSIS — J96.11 CHRONIC RESPIRATORY FAILURE WITH HYPOXIA AND HYPERCAPNIA (H): Primary | ICD-10-CM

## 2024-01-23 DIAGNOSIS — J96.12 CHRONIC RESPIRATORY FAILURE WITH HYPOXIA AND HYPERCAPNIA (H): Primary | ICD-10-CM

## 2024-01-23 PROCEDURE — 99214 OFFICE O/P EST MOD 30 MIN: CPT | Performed by: INTERNAL MEDICINE
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
## 2024-01-23 NOTE — PROGRESS NOTES
DME pt uses: [facility]     Pt uses this DME for: oxygen and cpap    Genesis Loera LPN

## 2024-01-29 NOTE — PROGRESS NOTES
Assessment & Plan   Problem List Items Addressed This Visit          Digestive    Morbid obesity with BMI of 60.0-69.9, adult (H) - Primary       Circulatory    (HFpEF) heart failure with preserved ejection fraction (H)     Other Visit Diagnoses       Cellulitis of face        Relevant Medications    doxycycline hyclate (VIBRA-TABS) 100 MG tablet           1. Morbid obesity with BMI of 60.0-69.9, adult (H)  Discussed extensively. She will schedule with Ela to discuss ozempic and how this will affect blood sugars, weight.    2. Chronic heart failure with preserved ejection fraction (H)  Followed by cardiology.    3. Cellulitis of face  Doxycycline. Watch carefully and recheck if no improvement or worsening.  - doxycycline hyclate (VIBRA-TABS) 100 MG tablet; Take 1 tablet (100 mg) by mouth 2 times daily for 10 days  Dispense: 20 tablet; Refill: 0       4. Type 2 diabetes mellitus without complication, without long-term current use of insulin (H)  See above        BMI  Estimated body mass index is 61.13 kg/m  as calculated from the following:    Height as of this encounter: 1.524 m (5').    Weight as of this encounter: 142 kg (313 lb).       We will continue to manage her chronic medical care.    FUTURE APPOINTMENTS:       - Follow-up visit as needed.    No follow-ups on file.    Noelle Garcia MD  [facility]    Subjective     Nursing Notes:   Rain Walsh  2/5/2024  2:01 PM  Signed  Chief Complaint   Patient presents with    Recheck Medication     Cardiologist suggested that Elyssa changes diabetic medication to Jardiance or Ozempic         Pre-visit Screening:  Immunizations:  up to date  Colonoscopy:  is up to date  Mammogram: is up to date  Asthma Action Test/Plan:  NA  PHQ9:  NA  GAD7:  NA  Questioned patient about current smoking habits Pt. has never smoked.  Ok to leave detailed message on voice mail for today's visit only Yes, phone # [phone]         Mitra Decker is a 59 year  old female who presents to clinic today for the following health issues   HPI     Here to followup on her cardiology recommendation to go on a weight loss medication. She is overweight.  Last week saw her pulmonologist and she recommended a second opinion with cardiology. Is on chronic oxygen. Here with daughter today.    Checked with her insurance, isn't sure about   No personal or family history thyroid cancer except aunt with thyroid cancer.    Has a lesion on bottom of the chin, is infected. For a few days.        Review of Systems   Constitutional, HEENT, cardiovascular, pulmonary, gi and gu systems are negative, except as otherwise noted.      Objective    /78 (BP Location: Right arm, Patient Position: Sitting, Cuff Size: Adult Large)   Pulse 105   Temp 98.9  F (37.2  C) (Temporal)   Ht 1.524 m (5')   Wt 142 kg (313 lb)   SpO2 96%   BMI 61.13 kg/m    Body mass index is 61.13 kg/m .  Physical Exam   GENERAL: alert and no distress  RESP: lungs clear to auscultation - no rales, rhonchi or wheezes  CV: regular rate and rhythm, normal S1 S2, no S3 or S4, no murmur, click or rub, no peripheral edema  MS: no gross musculoskeletal defects noted, no edema  NEURO: Normal strength and tone, mentation intact and speech normal  PSYCH: mentation appears normal, affect normal/bright  Neck/chin area with raised red lesion, surrounding redness suggestive of cellulitis    No results found for any visits on 02/05/24.

## 2024-01-31 ENCOUNTER — MEDICAL CORRESPONDENCE (OUTPATIENT)
Dept: HEALTH INFORMATION MANAGEMENT | Facility: CLINIC | Age: 59
End: 2024-01-31
Payer: COMMERCIAL

## 2024-02-05 ENCOUNTER — OFFICE VISIT (OUTPATIENT)
Dept: FAMILY MEDICINE | Facility: CLINIC | Age: 59
End: 2024-02-05

## 2024-02-05 VITALS
HEIGHT: 60 IN | HEART RATE: 105 BPM | DIASTOLIC BLOOD PRESSURE: 78 MMHG | WEIGHT: 293 LBS | TEMPERATURE: 98.9 F | OXYGEN SATURATION: 96 % | SYSTOLIC BLOOD PRESSURE: 130 MMHG | BODY MASS INDEX: 57.52 KG/M2

## 2024-02-05 DIAGNOSIS — E11.9 TYPE 2 DIABETES MELLITUS WITHOUT COMPLICATION, WITHOUT LONG-TERM CURRENT USE OF INSULIN (H): ICD-10-CM

## 2024-02-05 DIAGNOSIS — I50.32 CHRONIC HEART FAILURE WITH PRESERVED EJECTION FRACTION (H): ICD-10-CM

## 2024-02-05 DIAGNOSIS — E66.01 MORBID OBESITY WITH BMI OF 60.0-69.9, ADULT (H): Primary | ICD-10-CM

## 2024-02-05 DIAGNOSIS — L03.211 CELLULITIS OF FACE: ICD-10-CM

## 2024-02-05 PROCEDURE — 99214 OFFICE O/P EST MOD 30 MIN: CPT | Performed by: FAMILY MEDICINE

## 2024-02-05 PROCEDURE — G2211 COMPLEX E/M VISIT ADD ON: HCPCS | Performed by: FAMILY MEDICINE

## 2024-02-05 RX ORDER — DOXYCYCLINE HYCLATE 100 MG
100 TABLET ORAL 2 TIMES DAILY
Qty: 20 TABLET | Refills: 0 | Status: SHIPPED | OUTPATIENT
Start: 2024-02-05 | End: 2024-02-15

## 2024-02-05 NOTE — NURSING NOTE
Chief Complaint   Patient presents with    Recheck Medication     Cardiologist suggested that Elyssa changes diabetic medication to Jardiance or Ozempic         Pre-visit Screening:  Immunizations:  up to date  Colonoscopy:  is up to date  Mammogram: is up to date  Asthma Action Test/Plan:  NA  PHQ9:  NA  GAD7:  NA  Questioned patient about current smoking habits Pt. has never smoked.  Ok to leave detailed message on voice mail for today's visit only Yes, phone # [phone]

## 2024-02-15 ENCOUNTER — OFFICE VISIT (OUTPATIENT)
Dept: FAMILY MEDICINE | Facility: CLINIC | Age: 59
End: 2024-02-15

## 2024-02-15 ENCOUNTER — TELEPHONE (OUTPATIENT)
Dept: FAMILY MEDICINE | Facility: CLINIC | Age: 59
End: 2024-02-15

## 2024-02-15 DIAGNOSIS — E11.9 TYPE 2 DIABETES MELLITUS WITHOUT COMPLICATION, WITHOUT LONG-TERM CURRENT USE OF INSULIN (H): Primary | ICD-10-CM

## 2024-02-15 RX ORDER — TIRZEPATIDE 5 MG/.5ML
5 INJECTION, SOLUTION SUBCUTANEOUS
Qty: 2 ML | Refills: 0 | Status: SHIPPED | OUTPATIENT
Start: 2024-02-15 | End: 2024-04-26

## 2024-02-15 NOTE — PROGRESS NOTES
SUBJECTIVE / OBJECTIVE:                                                Mirta Decker is a 59 year old female seen for an initial visit for Medication Therapy Management.  She was referred to me by Dr. Noelle Garcia.     REASON FOR MTM REFERRAL: medication management services     PATIENT CHIEF COMPLAINT/CONCERN: Diabetes control    PAST MEDICAL HISTORY: Reviewed in chart    MEDICAL CONDITIONS REVIEWED:    diabetes mellitus-type 2    Current Outpatient Medications   Medication Sig Dispense Refill    tirzepatide (MOUNJARO) 5 MG/0.5ML pen Inject 5 mg Subcutaneous every 7 days 2 mL 0    albuterol (PROAIR HFA/PROVENTIL HFA/VENTOLIN HFA) 108 (90 Base) MCG/ACT inhaler Inhale 2 puffs into the lungs every 6 hours 18 g 11    apremilast (OTEZLA) 30 MG tablet Take 1 tablet by mouth 2 times daily      ascorbic acid (EQL VITAMIN C) 500 MG TABS Take 1 tablet (500 mg) by mouth daily With iron 90 tablet 3    blood glucose (ACCU-CHEK GUIDE) test strip Use to test blood sugar 3 times daily or as directed. 100 strip 3    blood glucose monitoring (ACCU-CHEK FASTCLIX) lancets Test 4 times a day 102 each 3    budesonide (PULMICORT) 0.5 MG/2ML neb solution Take 0.5 mg by nebulization 2 times daily as needed      calcipotriene (DOVONOX) 0.005 % external cream Apply topically nightly as needed      cetirizine (ZYRTEC) 10 MG tablet TAKE 1 TABLET BY MOUTH ONE TIME DAILY 90 tablet 1    doxycycline hyclate (VIBRA-TABS) 100 MG tablet Take 1 tablet (100 mg) by mouth 2 times daily for 10 days 20 tablet 0    famotidine (PEPCID) 20 MG tablet Take 1 tablet (20 mg) by mouth 2 times daily 180 tablet 1    ferrous sulfate (FEROSUL) 325 (65 Fe) MG tablet Take 1 tablet (325 mg) by mouth daily 90 tablet 1    fluticasone (FLONASE) 50 MCG/ACT nasal spray Spray 1 spray into both nostrils daily 16 g 5    folic acid (FOLVITE) 1 MG tablet 1 tablet (1 mg) daily  0    ipratropium - albuterol 0.5 mg/2.5 mg/3 mL (DUONEB) 0.5-2.5 (3) MG/3ML neb solution Take 1 vial  by nebulization every 4 hours as needed for shortness of breath / dyspnea or wheezing      losartan (COZAAR) 50 MG tablet Take 1 tablet (50 mg) by mouth daily HOLD if systolic Blood pressure is under 115 90 tablet 1    metFORMIN (GLUCOPHAGE XR) 500 MG 24 hr tablet Take 2 tablets (1,000 mg) by mouth 2 times daily (with meals) 360 tablet 1    METHOTREXate, PF, (OTREXUP) 25 MG/0.4ML pen Inject 25 mg Subcutaneous every 7 days      metolazone (ZAROXOLYN) 2.5 MG tablet Take 1 tablet (2.5 mg) by mouth daily as needed (weight up, increased swelling) , to take every 3-5 days as needed. Hold for systolic blood pressure under 110 30 tablet 11    Multiple Vitamins-Minerals (CENTROVITE) TABS Take 1 tablet by mouth daily 90 tablet 3    nystatin (MYCOSTATIN) 087167 UNIT/GM external powder Apply topically 2 times daily as needed for other (fungal) , under breasts 60 g 1    ondansetron (ZOFRAN) 4 MG tablet Take 1 tablet (4 mg) by mouth every 8 hours as needed for nausea 30 tablet 0    order for DME 1: Custom BLE knee high compression stockings; 2: Custom kevin compression pant; 3; BLE knee high 20-30 mm Gh compression stockings 1 each 0    potassium chloride ER (KLOR-CON M) 10 MEQ CR tablet Take 1 tablet (10 mEq) by mouth daily 90 tablet 1    senna (SM SENNA LAXATIVE) 8.6 MG tablet Take 1 tablet by mouth daily 90 tablet 3    sertraline (ZOLOFT) 100 MG tablet TAKE 1 TABLET BY MOUTH ONE TIME DAILY 90 tablet 1    torsemide (DEMADEX) 20 MG tablet TAKE FOUR TABLETS BY MOUTH TWO TIMES DAILY 240 tablet 2       Current labs include:  BP Readings from Last 3 Encounters:   02/05/24 130/78   01/23/24 138/86   11/29/23 (!) 149/85       There were no vitals taken for this visit.    Most Recent Immunizations   Administered Date(s) Administered    COVID-19 Bivalent 18+ (Moderna) 12/17/2022    COVID-19 Monovalent 18+ (Moderna) 08/13/2022    Dates Unk - Records Requested 11/26/2002    Flu, Unspecified 09/08/2020    Influenza (H1N1) 11/16/2009     Influenza (IIV3) PF 12/22/2014    Influenza Vaccine >6 months,quad, PF 11/03/2023    TDAP (Adacel,Boostrix) 09/30/2022       ASSESSMENT / PLAN:                                                       Diabetes:  Assessment: Diabetes control has improved since visit 6 months ago, however still not at goal A1c. Patient states that cardiologist has recommended both GLP-1 and SGLT-2 options to help control. I am in agreement that both would be of benefit. Discussed both medication classes today and would like to start with initiating Mounjaro today.    Patient does experience nausea with current medication regimen. Discussed that this may occur as well with addition of Mounjaro, but will typically resolve after initiation of medication. Does have Zofran at home that she uses when needed.    Patient states she typically eats smaller meals every 2-3 hours. Will snack through the course of the day as well.    Protein hardest to eat. Doesn't care for meat, likes fish, tuna, shrimp.    Likes chocolate, but does limit sweets.    Also does like starchy vegetables.    Patient states she does have a bike and arm bike which she will use, however after fall last September has not been using regularly.    Patient states she does get plenty of fluids, but stressed importance of continuing with fluids while on medication.    Also discussed starting Jardiance in future. I do not want to initiate both medications together.    Telephone follow up in 4 weeks. To determine Mounjaro taper.    Status: Diabetes control/weight loss    Drug Therapy Problems:  1) Additional diabetes medication warranted.    Plan:  1) Initiate 2.5mg Mounjaro weekly. Gave patient one month of samples.  2) Sent in 5mg Mounjaro to initiate PA for patient.       I spent 1 hour with this patient today.  All changes were made via collaborative practice agreement with Noelle Garcia. A copy of the visit note was provided to the patient's primary care provider.    Ela  Yee Chamorro  Medication Therapy Management Pharmacist  [facility]  Office Phone: [phone]

## 2024-02-15 NOTE — TELEPHONE ENCOUNTER
Insurance will not cover for weight loss. Can the Dx be changed to Type 2 DM?    Please advise  Thanks, Barbara

## 2024-02-21 DIAGNOSIS — I50.32 CHRONIC HEART FAILURE WITH PRESERVED EJECTION FRACTION (H): ICD-10-CM

## 2024-02-21 DIAGNOSIS — J96.12 CHRONIC RESPIRATORY FAILURE WITH HYPOXIA AND HYPERCAPNIA (H): ICD-10-CM

## 2024-02-21 DIAGNOSIS — J96.11 CHRONIC RESPIRATORY FAILURE WITH HYPOXIA AND HYPERCAPNIA (H): ICD-10-CM

## 2024-02-21 RX ORDER — TORSEMIDE 20 MG/1
TABLET ORAL
Qty: 240 TABLET | Refills: 2 | Status: SHIPPED | OUTPATIENT
Start: 2024-02-21 | End: 2024-08-05

## 2024-03-13 ENCOUNTER — TELEPHONE (OUTPATIENT)
Dept: FAMILY MEDICINE | Facility: CLINIC | Age: 59
End: 2024-03-13

## 2024-03-13 NOTE — TELEPHONE ENCOUNTER
Follow up with patient after initiation of Mounjaro. Started on 2.5mg weekly and notes has been doing well. Appetite has decreased, and fasting blood sugars have been in the low 100s.    Patient has had nausea for some time and states that this may have worsened a bit, but is manageable.    I recommend patient remain on 2.5mg dose for one additional month, then increase to 5mg if back to baseline nausea.    Ela Chamorro, PharmD  Clinical Pharmacist  [facility]  General Clinic Phone: [phone]  Direct Office Phone: [phone]

## 2024-03-13 NOTE — Clinical Note
Please see chart. Patient seems to be doing pretty well with addition of Mounjaro. Would like for nausea to return to baseline prior to increasing dose. BS are at goal with addition of Mounjaro.

## 2024-03-16 ENCOUNTER — HEALTH MAINTENANCE LETTER (OUTPATIENT)
Age: 59
End: 2024-03-16

## 2024-04-09 ENCOUNTER — TRANSFERRED RECORDS (OUTPATIENT)
Dept: FAMILY MEDICINE | Facility: CLINIC | Age: 59
End: 2024-04-09

## 2024-04-11 ENCOUNTER — TRANSFERRED RECORDS (OUTPATIENT)
Dept: FAMILY MEDICINE | Facility: CLINIC | Age: 59
End: 2024-04-11

## 2024-04-26 ENCOUNTER — TELEPHONE (OUTPATIENT)
Dept: FAMILY MEDICINE | Facility: CLINIC | Age: 59
End: 2024-04-26

## 2024-04-26 DIAGNOSIS — E11.9 TYPE 2 DIABETES MELLITUS WITHOUT COMPLICATION, WITHOUT LONG-TERM CURRENT USE OF INSULIN (H): Primary | ICD-10-CM

## 2024-04-26 NOTE — TELEPHONE ENCOUNTER
Patient increased to Mounjaro 5mg, and the week after first injection had constipation and upper abdominal pain which was significantly better after bowel movement.    I would like for patient to decrease back to the 2.5mg dose and will follow up in 4 weeks.    Patient will reach out if abdominal pain returns.    Ela Chamorro, PharmD  Clinical Pharmacist  [facility]  General [facility] Phone: [phone]  Direct Office Phone: [phone]

## 2024-05-01 NOTE — PROGRESS NOTES
Assessment & Plan   Problem List Items Addressed This Visit          Digestive    Morbid obesity with BMI of 60.0-69.9, adult (H)    Relevant Medications    metFORMIN (GLUCOPHAGE XR) 500 MG 24 hr tablet       Circulatory    Essential hypertension, benign    Relevant Medications    losartan (COZAAR) 50 MG tablet       Hematologic    Anemia    Relevant Medications    famotidine (PEPCID) 20 MG tablet    ferrous sulfate (FEROSUL) 325 (65 Fe) MG tablet     Other Visit Diagnoses       Encounter for screening mammogram for breast cancer    -  Primary    Relevant Orders    MA Screening Bilateral w/ Keith    Radiology Referral (Affiliate Use Only)    Chronic seasonal allergic rhinitis due to pollen        Relevant Medications    cetirizine (ZYRTEC) 10 MG tablet    Gastroesophageal reflux disease without esophagitis        Relevant Medications    famotidine (PEPCID) 20 MG tablet    ferrous sulfate (FEROSUL) 325 (65 Fe) MG tablet    senna (SM SENNA LAXATIVE) 8.6 MG tablet    Hypokalemia        Relevant Medications    potassium chloride melisa ER (KLOR-CON M10) 10 MEQ CR tablet    Major depressive disorder with single episode, in full remission (H24)        Relevant Medications    sertraline (ZOLOFT) 100 MG tablet    Other Relevant Orders    Comprehensive Metobolic Panel (BFP) (Completed)    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 Chronic seasonal allergic rhinitis due to  pollen  Refilled.  - cetirizine (ZYRTEC) 10 MG tablet; TAKE 1 TABLET BY MOUTH ONE TIME DAILY  Dispense: 90 tablet; Refill: 1    2. Gastroesophageal reflux disease without esophagitis  Symptoms are controlled, refilled.  - famotidine (PEPCID) 20 MG tablet; Take 1 tablet (20 mg) by mouth 2 times daily  Dispense: 180 tablet; Refill: 1  - ferrous sulfate (FEROSUL) 325 (65 Fe) MG tablet; Take 1 tablet (325 mg) by mouth daily  Dispense: 90 tablet; Refill: 1    3. Iron deficiency anemia due to chronic blood loss  Continue medications.  - famotidine (PEPCID) 20 MG tablet; Take 1 tablet (20 mg) by mouth 2 times daily  Dispense: 180 tablet; Refill: 1  - ferrous sulfate (FEROSUL) 325 (65 Fe) MG tablet; Take 1 tablet (325 mg) by mouth daily  Dispense: 90 tablet; Refill: 1    4. Hypokalemia  Refilled.  - potassium chloride melisa ER (KLOR-CON M10) 10 MEQ CR tablet; Take 1 tablet (10 mEq) by mouth daily  Dispense: 90 tablet; Refill: 1    5. Major depressive disorder with single episode, in full remission (H24)  Symptoms are controlled, refilled.  - sertraline (ZOLOFT) 100 MG tablet; TAKE 1 TABLET BY MOUTH ONE TIME DAILY  Dispense: 90 tablet; Refill: 1  - Comprehensive Metobolic Panel (BFP)    6. Essential hypertension, benign  Controlled, refilled.  - losartan (COZAAR) 50 MG tablet; Take 1 tablet (50 mg) by mouth daily HOLD if systolic Blood pressure is under 115  Dispense: 90 tablet; Refill: 1    7. 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   - losartan (COZAAR) 50 MG tablet; Take 1 tablet (50 mg) by mouth daily HOLD if systolic Blood pressure is under 115  Dispense: 90 tablet; Refill: 1  - metFORMIN (GLUCOPHAGE XR) 500 MG 24 hr tablet; Take 2 tablets (1,000 mg) by mouth 2 times daily (with meals)  Dispense: 360 tablet; Refill: 1  - HEMOGLOBIN A1C (BFP)  - VENOUS COLLECTION  - ALBUMIN RANDOM URINE QUANTITATIVE (BFP)  - FOOT EXAM  NO CHARGE [55931.114]    8. Morbid obesity with BMI of  60.0-69.9, adult (H)    - metFORMIN (GLUCOPHAGE XR) 500 MG 24 hr tablet; Take 2 tablets (1,000 mg) by mouth 2 times daily (with meals)  Dispense: 360 tablet; Refill: 1    9. Tinea corporis  refilled- nystatin (MYCOSTATIN) 768477 UNIT/GM external powder; Apply topically 2 times daily as needed for other (fungal) , under breasts  Dispense: 60 g; Refill: 1    10. Drug-induced constipation    - senna (SM SENNA LAXATIVE) 8.6 MG tablet; Take 1 tablet by mouth daily  Dispense: 90 tablet; Refill: 1    11. Encounter for screening mammogram for breast cancer    - MA Screening Bilateral w/ Keith; Future  - Radiology Referral (Affiliate Use Only)            BMI  Estimated body mass index is 55.86 kg/m  as calculated from the following:    Height as of 2/5/24: 1.524 m (5').    Weight as of this encounter: 129.7 kg (286 lb).         FUTURE APPOINTMENTS:       - Follow-up visit in 6 months. We manage her chronic medical care.    No follow-ups on file.    Noelle Garcia MD  [facility]    Subjective     Nursing Notes:   Sandi Boland CMA  5/7/2024 11:10 AM  Signed  Chief Complaint   Patient presents with    Recheck Medication     Non-fasting today, refill medications     Pre-visit Screening:  Immunizations:  not up to date - shingrix at pharmacy  Colonoscopy:  pt declines  Mammogram: is due and ordered today  Asthma Action Test/Plan:  NA  PHQ9:  Done today  GAD7:  Done today  Questioned patient about current smoking habits Pt. has never smoked.  Ok to leave detailed message on voice mail for today's visit only Yes, phone # [phone]       Mirta Decker is a 59 year old female who presents to clinic today for the following health issues   HPI     Here with daughter for medication check.  She is now on monjaro and has lost 20#. Side effects have improved. Is feeling better now. Is working with Ela on this. Blood sugars now in low 100s.   Saw her rheumatologist a month ago, in January lfts were fine. She  thinks that is from the methotrexate. She will go back to do more labs.        Review of Systems   Constitutional, HEENT, cardiovascular, pulmonary, gi and gu systems are negative, except as otherwise noted.      Objective    /82 (BP Location: Right arm, Patient Position: Sitting, Cuff Size: Adult Regular)   Pulse 103   Temp 98  F (36.7  C) (Temporal)   Wt 129.7 kg (286 lb)   SpO2 96%   BMI 55.86 kg/m    Body mass index is 55.86 kg/m .  Physical Exam   GENERAL: alert and no distress  RESP: lungs clear to auscultation - no rales, rhonchi or wheezes  CV: regular rate and rhythm, normal S1 S2, no S3 or S4, no murmur, click or rub, no peripheral edema  MS: no gross musculoskeletal defects noted, no edema  NEURO: Normal strength and tone, mentation intact and speech normal  PSYCH: mentation appears normal, affect normal/bright  Diabetic foot exam: normal DP and PT pulses, no trophic changes or ulcerative lesions, and normal sensory exam    Results for orders placed or performed in visit on 05/07/24   HEMOGLOBIN A1C (BFP)     Status: Abnormal   Result Value Ref Range    Hemoglobin A1C 6.5 (A) 4 - 5.6 %   ALBUMIN RANDOM URINE QUANTITATIVE (BFP)     Status: Abnormal   Result Value Ref Range    Albumin mg/L 30 30    Creatinine Urine mg/dL 50 300 mg/dL    Albumin Urine mg/g Cr  (A) 30 MG/G Creatinine   Comprehensive Metobolic Panel (BFP)     Status: Abnormal   Result Value Ref Range    Carbon Dioxide 30.0 20 - 32 mmol/L    Creatinine 1.08 0.60 - 1.30 mg/dL    Glucose 135 (A) 60 - 99 mg/dL    Sodium 142.0 135 - 146 mmol/L    Potassium 3.94 3.5 - 5.3 mmol/L    Chloride 99.7 98 - 110 mmol/L    Protein Total 8.5 (A) 6.1 - 8.1 g/dL    Albumin 3.6 3.6 - 5.1 g/dL    Alkaline Phosphatase 60 33 - 130 U/L    ALT 25 0 - 32 U/L    AST 31 0 - 35 U/L    Bilirubin Total 0.9 0.2 - 1.2 mg/dL    Urea Nitrogen 19 7 - 25 mg/dL    Calcium 9.3 8.6 - 10.3 mg/dL    BUN/Creatinine Ratio 17.6 6 - 32    Globulin Calculated 4.9     A/G  Ratio 0.7

## 2024-05-07 ENCOUNTER — OFFICE VISIT (OUTPATIENT)
Dept: FAMILY MEDICINE | Facility: CLINIC | Age: 59
End: 2024-05-07

## 2024-05-07 VITALS
HEART RATE: 103 BPM | OXYGEN SATURATION: 96 % | BODY MASS INDEX: 55.86 KG/M2 | DIASTOLIC BLOOD PRESSURE: 82 MMHG | SYSTOLIC BLOOD PRESSURE: 128 MMHG | TEMPERATURE: 98 F | WEIGHT: 286 LBS

## 2024-05-07 DIAGNOSIS — E66.01 MORBID OBESITY WITH BMI OF 60.0-69.9, ADULT (H): ICD-10-CM

## 2024-05-07 DIAGNOSIS — R77.9 HIGH PLASMA PROTEIN LEVEL: ICD-10-CM

## 2024-05-07 DIAGNOSIS — E11.8 DIABETES MELLITUS TYPE 2 WITH COMPLICATIONS (H): ICD-10-CM

## 2024-05-07 DIAGNOSIS — D50.0 IRON DEFICIENCY ANEMIA DUE TO CHRONIC BLOOD LOSS: ICD-10-CM

## 2024-05-07 DIAGNOSIS — Z12.31 ENCOUNTER FOR SCREENING MAMMOGRAM FOR BREAST CANCER: Primary | ICD-10-CM

## 2024-05-07 DIAGNOSIS — K21.9 GASTROESOPHAGEAL REFLUX DISEASE WITHOUT ESOPHAGITIS: ICD-10-CM

## 2024-05-07 DIAGNOSIS — E87.6 HYPOKALEMIA: ICD-10-CM

## 2024-05-07 DIAGNOSIS — K59.03 DRUG-INDUCED CONSTIPATION: ICD-10-CM

## 2024-05-07 DIAGNOSIS — B35.4 TINEA CORPORIS: ICD-10-CM

## 2024-05-07 DIAGNOSIS — J30.1 CHRONIC SEASONAL ALLERGIC RHINITIS DUE TO POLLEN: ICD-10-CM

## 2024-05-07 DIAGNOSIS — F32.5 MAJOR DEPRESSIVE DISORDER WITH SINGLE EPISODE, IN FULL REMISSION (H): ICD-10-CM

## 2024-05-07 DIAGNOSIS — I10 ESSENTIAL HYPERTENSION, BENIGN: ICD-10-CM

## 2024-05-07 LAB
ALBUMIN (URINE) MG/L: 30
ALBUMIN SERPL-MCNC: 3.6 G/DL (ref 3.6–5.1)
ALBUMIN URINE MG/G CR: ABNORMAL MG/G CREATININE
ALBUMIN/GLOB SERPL: 0.7 {RATIO}
ALP SERPL-CCNC: 60 U/L (ref 33–130)
ALT 1742-6: 25 U/L (ref 0–32)
AST 1920-8: 31 U/L (ref 0–35)
BILIRUB SERPL-MCNC: 0.9 MG/DL (ref 0.2–1.2)
BUN SERPL-MCNC: 19 MG/DL (ref 7–25)
BUN/CREATININE RATIO: 17.6 (ref 6–32)
CALCIUM SERPL-MCNC: 9.3 MG/DL (ref 8.6–10.3)
CHLORIDE SERPLBLD-SCNC: 99.7 MMOL/L (ref 98–110)
CO2 SERPL-SCNC: 30 MMOL/L (ref 20–32)
CREAT SERPL-MCNC: 1.08 MG/DL (ref 0.6–1.3)
CREATININE URINE MG/DL: 50 MG/DL
GLOBULIN, CALCULATED - QUEST: 4.9
GLUCOSE SERPL-MCNC: 135 MG/DL (ref 60–99)
HEMOGLOBIN A1C: 6.5 % (ref 4–5.6)
POTASSIUM SERPL-SCNC: 3.94 MMOL/L (ref 3.5–5.3)
PROT SERPL-MCNC: 8.5 G/DL (ref 6.1–8.1)
SODIUM SERPL-SCNC: 142 MMOL/L (ref 135–146)

## 2024-05-07 PROCEDURE — 83036 HEMOGLOBIN GLYCOSYLATED A1C: CPT | Performed by: FAMILY MEDICINE

## 2024-05-07 PROCEDURE — 99213 OFFICE O/P EST LOW 20 MIN: CPT | Performed by: FAMILY MEDICINE

## 2024-05-07 PROCEDURE — 82043 UR ALBUMIN QUANTITATIVE: CPT | Performed by: FAMILY MEDICINE

## 2024-05-07 PROCEDURE — 82570 ASSAY OF URINE CREATININE: CPT | Performed by: FAMILY MEDICINE

## 2024-05-07 PROCEDURE — G2211 COMPLEX E/M VISIT ADD ON: HCPCS | Performed by: FAMILY MEDICINE

## 2024-05-07 PROCEDURE — 80053 COMPREHEN METABOLIC PANEL: CPT | Performed by: FAMILY MEDICINE

## 2024-05-07 PROCEDURE — 36415 COLL VENOUS BLD VENIPUNCTURE: CPT | Performed by: FAMILY MEDICINE

## 2024-05-07 RX ORDER — SENNOSIDES A AND B 8.6 MG/1
1 TABLET, FILM COATED ORAL DAILY
Qty: 90 TABLET | Refills: 1 | Status: SHIPPED | OUTPATIENT
Start: 2024-05-07

## 2024-05-07 RX ORDER — SERTRALINE HYDROCHLORIDE 100 MG/1
TABLET, FILM COATED ORAL
Qty: 90 TABLET | Refills: 1 | Status: SHIPPED | OUTPATIENT
Start: 2024-05-07

## 2024-05-07 RX ORDER — METFORMIN HCL 500 MG
1000 TABLET, EXTENDED RELEASE 24 HR ORAL 2 TIMES DAILY WITH MEALS
Qty: 360 TABLET | Refills: 1 | Status: SHIPPED | OUTPATIENT
Start: 2024-05-07

## 2024-05-07 RX ORDER — LOSARTAN POTASSIUM 50 MG/1
50 TABLET ORAL DAILY
Qty: 90 TABLET | Refills: 1 | Status: SHIPPED | OUTPATIENT
Start: 2024-05-07

## 2024-05-07 RX ORDER — FERROUS SULFATE 325(65) MG
TABLET ORAL
Qty: 90 TABLET | Refills: 1 | Status: SHIPPED | OUTPATIENT
Start: 2024-05-07

## 2024-05-07 RX ORDER — POTASSIUM CHLORIDE 750 MG/1
10 TABLET, EXTENDED RELEASE ORAL DAILY
Qty: 90 TABLET | Refills: 1 | Status: SHIPPED | OUTPATIENT
Start: 2024-05-07

## 2024-05-07 RX ORDER — FAMOTIDINE 20 MG/1
20 TABLET, FILM COATED ORAL 2 TIMES DAILY
Qty: 180 TABLET | Refills: 1 | Status: SHIPPED | OUTPATIENT
Start: 2024-05-07

## 2024-05-07 RX ORDER — NYSTATIN 100000 [USP'U]/G
POWDER TOPICAL 2 TIMES DAILY PRN
Qty: 60 G | Refills: 1 | Status: SHIPPED | OUTPATIENT
Start: 2024-05-07

## 2024-05-07 RX ORDER — CETIRIZINE HYDROCHLORIDE 10 MG/1
TABLET ORAL
Qty: 90 TABLET | Refills: 1 | Status: SHIPPED | OUTPATIENT
Start: 2024-05-07

## 2024-05-07 RX ORDER — FLUTICASONE PROPIONATE 50 MCG
1 SPRAY, SUSPENSION (ML) NASAL DAILY
Qty: 16 G | Refills: 5 | Status: CANCELLED | OUTPATIENT
Start: 2024-05-07

## 2024-05-07 ASSESSMENT — ANXIETY QUESTIONNAIRES
5. BEING SO RESTLESS THAT IT IS HARD TO SIT STILL: SEVERAL DAYS
2. NOT BEING ABLE TO STOP OR CONTROL WORRYING: NOT AT ALL
1. FEELING NERVOUS, ANXIOUS, OR ON EDGE: SEVERAL DAYS
IF YOU CHECKED OFF ANY PROBLEMS ON THIS QUESTIONNAIRE, HOW DIFFICULT HAVE THESE PROBLEMS MADE IT FOR YOU TO DO YOUR WORK, TAKE CARE OF THINGS AT HOME, OR GET ALONG WITH OTHER PEOPLE: NOT DIFFICULT AT ALL
3. WORRYING TOO MUCH ABOUT DIFFERENT THINGS: NOT AT ALL
7. FEELING AFRAID AS IF SOMETHING AWFUL MIGHT HAPPEN: NOT AT ALL
GAD7 TOTAL SCORE: 3
GAD7 TOTAL SCORE: 3
6. BECOMING EASILY ANNOYED OR IRRITABLE: SEVERAL DAYS

## 2024-05-07 ASSESSMENT — PATIENT HEALTH QUESTIONNAIRE - PHQ9
5. POOR APPETITE OR OVEREATING: NOT AT ALL
SUM OF ALL RESPONSES TO PHQ QUESTIONS 1-9: 4

## 2024-05-07 NOTE — NURSING NOTE
Chief Complaint   Patient presents with    Recheck Medication     Non-fasting today, refill medications     Pre-visit Screening:  Immunizations:  not up to date - shingrix at pharmacy  Colonoscopy:  pt declines  Mammogram: is due and ordered today  Asthma Action Test/Plan:  NA  PHQ9:  Done today  GAD7:  Done today  Questioned patient about current smoking habits Pt. has never smoked.  Ok to leave detailed message on voice mail for today's visit only Yes, phone # [phone]

## 2024-05-09 ENCOUNTER — TRANSFERRED RECORDS (OUTPATIENT)
Dept: FAMILY MEDICINE | Facility: CLINIC | Age: 59
End: 2024-05-09

## 2024-05-13 ENCOUNTER — TELEPHONE (OUTPATIENT)
Dept: PULMONOLOGY | Facility: CLINIC | Age: 59
End: 2024-05-13

## 2024-05-31 DIAGNOSIS — R77.9 HIGH PLASMA PROTEIN LEVEL: ICD-10-CM

## 2024-05-31 PROCEDURE — 84155 ASSAY OF PROTEIN SERUM: CPT | Performed by: FAMILY MEDICINE

## 2024-05-31 PROCEDURE — 36415 COLL VENOUS BLD VENIPUNCTURE: CPT | Performed by: FAMILY MEDICINE

## 2024-05-31 PROCEDURE — 84165 PROTEIN E-PHORESIS SERUM: CPT | Performed by: FAMILY MEDICINE

## 2024-06-04 LAB
ALBUMIN SERPL-MCNC: 3.6 G/DL (ref 3.8–4.8)
ALPHA-1-GLOBULIN - QUEST: 0.4 G/DL (ref 0.2–0.3)
ALPHA-2-GLOBULIN - QUEST: 0.7 G/DL (ref 0.5–0.9)
BETA 1 GLOBULIN: 0.6 G/DL (ref 0.4–0.6)
BETA2 GLOB SERPL ELPH-MCNC: 0.7 G/DL (ref 0.2–0.5)
GAMMA GLOBULIN - QUEST: 2.4 G/DL (ref 0.8–1.7)
INTERPRETATION: ABNORMAL
PROT SERPL-MCNC: 8.4 G/DL (ref 6.1–8.1)

## 2024-06-14 ENCOUNTER — MEDICAL CORRESPONDENCE (OUTPATIENT)
Dept: HEALTH INFORMATION MANAGEMENT | Facility: CLINIC | Age: 59
End: 2024-06-14
Payer: COMMERCIAL

## 2024-06-19 DIAGNOSIS — E11.9 TYPE 2 DIABETES MELLITUS WITHOUT COMPLICATION, WITHOUT LONG-TERM CURRENT USE OF INSULIN (H): ICD-10-CM

## 2024-06-20 RX ORDER — TIRZEPATIDE 2.5 MG/.5ML
2.5 INJECTION, SOLUTION SUBCUTANEOUS
COMMUNITY
Start: 2024-06-20

## 2024-06-20 NOTE — TELEPHONE ENCOUNTER
Mirta Decker is requesting a refill of:    Refused Prescriptions:                       Disp   Refills    tirzepatide (MOUNJARO) 2.5 MG/0.5ML pen [P*                Sig: Inject 2.5 mg Subcutaneous every 7 days  Refused By: ADONAY HEDRICK  Reason for Refusal: Patient should contact provider first    LM for patient to call back, wondering if she wants to continue on 2.5mg dose or go up, last time she upp'd the dose she had gastro issues

## 2024-06-20 NOTE — TELEPHONE ENCOUNTER
Pt called back, she would like to stay on 2.5mg dosing for atleast 1 more month. Stomach issues have improved. She has now lost 36lbs!    Mirta Decker is requesting a refill of:    Pending Prescriptions:                       Disp   Refills    tirzepatide (MOUNJARO) 2.5 MG/0.5ML pen   2 mL   0            Sig: Inject 2.5 mg Subcutaneous every 7 days  Refused Prescriptions:                       Disp   Refills    tirzepatide (MOUNJARO) 2.5 MG/0.5ML pen [P*                Sig: Inject 2.5 mg Subcutaneous every 7 days  Refused By: ADONAY HEDRICK  Reason for Refusal: Patient should contact provider first

## 2024-06-24 ENCOUNTER — TELEPHONE (OUTPATIENT)
Dept: SLEEP MEDICINE | Facility: CLINIC | Age: 59
End: 2024-06-24
Payer: COMMERCIAL

## 2024-06-24 NOTE — TELEPHONE ENCOUNTER
DME orders signed and faxed to  Respiratory at fax [phone].   Ph. [phone]  Marie Watt MA

## 2024-07-21 DIAGNOSIS — E11.9 TYPE 2 DIABETES MELLITUS WITHOUT COMPLICATION, WITHOUT LONG-TERM CURRENT USE OF INSULIN (H): ICD-10-CM

## 2024-07-22 ENCOUNTER — TRANSFERRED RECORDS (OUTPATIENT)
Dept: HEALTH INFORMATION MANAGEMENT | Facility: CLINIC | Age: 59
End: 2024-07-22
Payer: COMMERCIAL

## 2024-07-22 ENCOUNTER — MYC MEDICAL ADVICE (OUTPATIENT)
Dept: FAMILY MEDICINE | Facility: CLINIC | Age: 59
End: 2024-07-22

## 2024-07-22 DIAGNOSIS — E11.9 TYPE 2 DIABETES MELLITUS WITHOUT COMPLICATION, WITHOUT LONG-TERM CURRENT USE OF INSULIN (H): ICD-10-CM

## 2024-07-22 RX ORDER — TIRZEPATIDE 2.5 MG/.5ML
2.5 INJECTION, SOLUTION SUBCUTANEOUS
COMMUNITY
Start: 2024-07-22

## 2024-07-22 NOTE — TELEPHONE ENCOUNTER
Mirta Decker is requesting a refill of:    Refused Prescriptions:                       Disp   Refills    tirzepatide (MOUNJARO) 2.5 MG/0.5ML pen [P*                Sig: Inject 2.5 mg Subcutaneous every 7 days  Refused By: ADONAY HEDRICK  Reason for Refusal: Patient should contact provider first    Sent pt [employer] message

## 2024-07-24 ENCOUNTER — TRANSFERRED RECORDS (OUTPATIENT)
Dept: FAMILY MEDICINE | Facility: CLINIC | Age: 59
End: 2024-07-24

## 2024-07-26 ENCOUNTER — TRANSFERRED RECORDS (OUTPATIENT)
Dept: FAMILY MEDICINE | Facility: CLINIC | Age: 59
End: 2024-07-26

## 2024-08-02 DIAGNOSIS — I50.32 CHRONIC HEART FAILURE WITH PRESERVED EJECTION FRACTION (H): ICD-10-CM

## 2024-08-02 DIAGNOSIS — J96.12 CHRONIC RESPIRATORY FAILURE WITH HYPOXIA AND HYPERCAPNIA (H): ICD-10-CM

## 2024-08-02 DIAGNOSIS — J96.11 CHRONIC RESPIRATORY FAILURE WITH HYPOXIA AND HYPERCAPNIA (H): ICD-10-CM

## 2024-08-05 RX ORDER — TORSEMIDE 20 MG/1
TABLET ORAL
Qty: 240 TABLET | Refills: 0 | Status: SHIPPED | OUTPATIENT
Start: 2024-08-05 | End: 2024-08-15

## 2024-08-13 ENCOUNTER — TRANSFERRED RECORDS (OUTPATIENT)
Dept: HEALTH INFORMATION MANAGEMENT | Facility: CLINIC | Age: 59
End: 2024-08-13
Payer: COMMERCIAL

## 2024-08-14 DIAGNOSIS — E11.9 TYPE 2 DIABETES MELLITUS WITHOUT COMPLICATION, WITHOUT LONG-TERM CURRENT USE OF INSULIN (H): ICD-10-CM

## 2024-08-15 ENCOUNTER — OFFICE VISIT (OUTPATIENT)
Dept: PULMONOLOGY | Facility: CLINIC | Age: 59
End: 2024-08-15
Attending: INTERNAL MEDICINE
Payer: COMMERCIAL

## 2024-08-15 VITALS
DIASTOLIC BLOOD PRESSURE: 68 MMHG | BODY MASS INDEX: 52.54 KG/M2 | OXYGEN SATURATION: 97 % | HEART RATE: 102 BPM | SYSTOLIC BLOOD PRESSURE: 124 MMHG | WEIGHT: 269 LBS

## 2024-08-15 DIAGNOSIS — I50.32 CHRONIC HEART FAILURE WITH PRESERVED EJECTION FRACTION (H): ICD-10-CM

## 2024-08-15 DIAGNOSIS — J96.11 CHRONIC RESPIRATORY FAILURE WITH HYPOXIA AND HYPERCAPNIA (H): ICD-10-CM

## 2024-08-15 DIAGNOSIS — J96.12 CHRONIC RESPIRATORY FAILURE WITH HYPOXIA AND HYPERCAPNIA (H): ICD-10-CM

## 2024-08-15 PROCEDURE — 99213 OFFICE O/P EST LOW 20 MIN: CPT | Performed by: INTERNAL MEDICINE

## 2024-08-15 RX ORDER — TORSEMIDE 20 MG/1
60 TABLET ORAL 2 TIMES DAILY
Qty: 240 TABLET | Refills: 11 | Status: SHIPPED | OUTPATIENT
Start: 2024-08-15

## 2024-08-15 RX ORDER — TIRZEPATIDE 2.5 MG/.5ML
INJECTION, SOLUTION SUBCUTANEOUS
COMMUNITY
Start: 2024-08-15

## 2024-08-15 NOTE — TELEPHONE ENCOUNTER
Mirta Decker is requesting a refill of:    Refused Prescriptions:                       Disp   Refills    tirzepatide (MOUNJARO) 2.5 MG/0.5ML pen [P*                Sig: Inject 2.5 mg subcutaneously every 7 days  Refused By: ADONAY HEDRICK  Reason for Refusal: Patient should contact provider first    Sent [employer] message asking for update and if she is wanting to up her dose
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
## 2024-08-16 NOTE — TELEPHONE ENCOUNTER
Ela has been checking in with her via phone call. Do you want her to see Ela next month when she see's you?

## 2024-09-12 ENCOUNTER — MEDICAL CORRESPONDENCE (OUTPATIENT)
Dept: HEALTH INFORMATION MANAGEMENT | Facility: CLINIC | Age: 59
End: 2024-09-12
Payer: COMMERCIAL

## 2024-09-26 ENCOUNTER — MEDICAL CORRESPONDENCE (OUTPATIENT)
Dept: HEALTH INFORMATION MANAGEMENT | Facility: CLINIC | Age: 59
End: 2024-09-26
Payer: COMMERCIAL

## 2024-10-08 ENCOUNTER — MEDICAL CORRESPONDENCE (OUTPATIENT)
Dept: HEALTH INFORMATION MANAGEMENT | Facility: CLINIC | Age: 59
End: 2024-10-08
Payer: COMMERCIAL

## 2024-10-12 ENCOUNTER — HEALTH MAINTENANCE LETTER (OUTPATIENT)
Age: 59
End: 2024-10-12

## 2024-10-22 ENCOUNTER — TRANSFERRED RECORDS (OUTPATIENT)
Dept: FAMILY MEDICINE | Facility: CLINIC | Age: 59
End: 2024-10-22

## 2024-10-24 ENCOUNTER — MEDICAL CORRESPONDENCE (OUTPATIENT)
Dept: HEALTH INFORMATION MANAGEMENT | Facility: CLINIC | Age: 59
End: 2024-10-24
Payer: COMMERCIAL

## 2024-11-18 NOTE — PROGRESS NOTES
Assessment & Plan   Problem List Items Addressed This Visit       Essential hypertension, benign    Relevant Medications    losartan (COZAAR) 50 MG tablet    Morbid obesity with BMI of 60.0-69.9, adult (H)    Relevant Medications    Tirzepatide (MOUNJARO) 7.5 MG/0.5ML SOAJ    metFORMIN (GLUCOPHAGE XR) 500 MG 24 hr tablet    Anemia    Relevant Medications    famotidine (PEPCID) 20 MG tablet    ferrous sulfate (FEROSUL) 325 (65 Fe) MG tablet     Other Visit Diagnoses       Thrombocytopenia (H)    -  Primary    Relevant Orders    HEMOGRAM PLATELET DIFF (BFP)    Tinea corporis        Relevant Medications    nystatin (MYCOSTATIN) 223822 UNIT/GM external powder    Hypokalemia        Relevant Medications    potassium chloride melisa ER (KLOR-CON M10) 10 MEQ CR tablet    Gastroesophageal reflux disease without esophagitis        Relevant Medications    famotidine (PEPCID) 20 MG tablet    ferrous sulfate (FEROSUL) 325 (65 Fe) MG tablet    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 Tinea corporis  Refilled.  - nystatin (MYCOSTATIN) 962093 UNIT/GM external powder; Apply topically 2 times daily as needed for other (fungal). , under breasts  Dispense: 60 g; Refill: 1    2. Hypokalemia  Refilled.  - potassium chloride melisa ER (KLOR-CON M10) 10 MEQ CR tablet; Take 1 tablet (10 mEq) by mouth daily.  Dispense: 90 tablet; Refill: 1    3. Gastroesophageal reflux disease without esophagitis  Controlled, refilled.  - famotidine (PEPCID) 20 MG tablet; Take 1 tablet (20 mg) by mouth  2 times daily.  Dispense: 180 tablet; Refill: 1  - ferrous sulfate (FEROSUL) 325 (65 Fe) MG tablet; Take 1 tablet (325 mg) by mouth daily  Dispense: 90 tablet; Refill: 1    4. Iron deficiency anemia due to chronic blood loss  Check cbc. Refilled.  - famotidine (PEPCID) 20 MG tablet; Take 1 tablet (20 mg) by mouth 2 times daily.  Dispense: 180 tablet; Refill: 1  - ferrous sulfate (FEROSUL) 325 (65 Fe) MG tablet; Take 1 tablet (325 mg) by mouth daily  Dispense: 90 tablet; Refill: 1    5. Essential hypertension, benign  Controlled on medications, refilled.  - losartan (COZAAR) 50 MG tablet; Take 1 tablet (50 mg) by mouth daily. HOLD if systolic Blood pressure is under 115  Dispense: 90 tablet; Refill: 1    6. 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  - HEMOGLOBIN A1C (BFP)  - VENOUS COLLECTION  - Tirzepatide (MOUNJARO) 7.5 MG/0.5ML SOAJ; Inject 0.5 mLs (7.5 mg) subcutaneously every 7 days.  Dispense: 2 mL; Refill: 1  - losartan (COZAAR) 50 MG tablet; Take 1 tablet (50 mg) by mouth daily. HOLD if systolic Blood pressure is under 115  Dispense: 90 tablet; Refill: 1  - metFORMIN (GLUCOPHAGE XR) 500 MG 24 hr tablet; Take 2 tablets (1,000 mg) by mouth 2 times daily (with meals).  Dispense: 360 tablet; Refill: 1  - Lipid Panel (BFP)  - Comprehensive Metobolic Panel (BFP)    7. Major depressive disorder with single episode, in full remission (H)  Controlled, refilled.  - sertraline (ZOLOFT) 100 MG tablet; TAKE 1 TABLET BY MOUTH ONE TIME DAILY  Dispense: 90 tablet; Refill: 1    8. Morbid obesity with BMI of 60.0-69.9, adult (H)  Refilled.  - metFORMIN (GLUCOPHAGE XR) 500 MG 24 hr tablet; Take 2 tablets (1,000 mg) by mouth 2 times daily (with meals).  Dispense: 360 tablet; Refill: 1    9. Thrombocytopenia (H) (Primary)  Check. She will call her specialist with the platelet results and followup with rheumatology accordingly.  - HEMOGRAM PLATELET DIFF  (BFP)          BMI  Estimated body mass index is 52.73 kg/m  as calculated from the following:    Height as of 2/5/24: 1.524 m (5').    Weight as of this encounter: 122.5 kg (270 lb).         FUTURE APPOINTMENTS:       - Follow-up visit in  6months. We manage her chronic medical care.    No follow-ups on file.    Noelle Garcia MD  [facility]    Subjective     Nursing Notes:   Sandi Boland CMA  11/19/2024  9:55 AM  Signed  Chief Complaint   Patient presents with    Recheck Medication     Fasting today, refill medications, rheumatologist wants CBC platelets rechecked     Pre-visit Screening:  Immunizations:  not up to date - shingrix at pharmacy  Colonoscopy:  is up to date  Mammogram: is up to date  Asthma Action Test/Plan:  NA  PHQ9:  Done today  GAD7:  Done today  Questioned patient about current smoking habits Pt. has never smoked.  Ok to leave detailed message on voice mail for today's visit only Yes, phone # [phone]       Mirta Decker is a 59 year old female who presents to clinic today for the following health issues   HPI     Here with daughter. She is doing well on her medication. Has lost weight. Would like to increase her dose of mounjaro. Also has been working with her cardiologist to adjust her dose of torsemide.using this has helped her lose weight    Also wants platelets rechecked. She sees rheumatology and they noted low platelets and want her to recheck this. She will get in touch with them on the results.  Blood pressure is good, mood is good.  Is on iron.      Review of Systems   Constitutional, HEENT, cardiovascular, pulmonary, gi and gu systems are negative, except as otherwise noted.      Objective    /74 (BP Location: Right arm, Patient Position: Sitting, Cuff Size: Adult Large)   Pulse 90   Temp 97.8  F (36.6  C) (Temporal)   Wt 122.5 kg (270 lb)   SpO2 97%   BMI 52.73 kg/m    Body mass index is 52.73 kg/m .  Physical Exam   GENERAL: alert and no  distress  RESP: lungs clear to auscultation - no rales, rhonchi or wheezes  CV: regular rate and rhythm, normal S1 S2, no S3 or S4, no murmur, click or rub, no peripheral edema  MS: no gross musculoskeletal defects noted, no edema  PSYCH: mentation appears normal, affect normal/bright    Results for orders placed or performed in visit on 11/19/24   HEMOGLOBIN A1C (BFP)     Status: Abnormal   Result Value Ref Range    Hemoglobin A1C 6.1 (A) 4 - 5.6 %   Lipid Panel (BFP)     Status: Abnormal   Result Value Ref Range    Cholesterol 162 0 - 199 mg/dL    Triglycerides 171 (A) 0 - 149 mg/dL    HDL Cholesterol 46 40 - 150 mg/dL    LDL-C 82 0 - 129 mg/dL    Cholesterol/HDL Ratio 4 0 - 5   Comprehensive Metobolic Panel (BFP)     Status: Abnormal   Result Value Ref Range    Carbon Dioxide 27.2 20 - 32 mmol/L    Creatinine 1.27 0.60 - 1.30 mg/dL    Glucose 118 (A) 60 - 99 mg/dL    Sodium 140.1 135 - 146 mmol/L    Potassium 3.3 (A) 3.5 - 5.3 mmol/L    Chloride 100.3 98 - 110 mmol/L    Protein Total 7.7 6.1 - 8.1 g/dL    Albumin 3.3 (A) 3.6 - 5.1 g/dL    Alkaline Phosphatase 67 33 - 130 U/L    ALT 9 0 - 32 U/L    AST 23 0 - 35 U/L    Bilirubin Total 1.3 (A) 0.2 - 1.2 mg/dL    Urea Nitrogen 16 7 - 25 mg/dL    Calcium 8.9 8.6 - 10.3 mg/dL    BUN/Creatinine Ratio 13 6 - 32   HEMOGRAM PLATELET DIFF (BFP)     Status: Abnormal   Result Value Ref Range    WBC 5.1 4.0 - 11 10*9/L    RBC Count 3.58 (A) 3.8 - 5.2 10*12/L    Hemoglobin 10.8 (A) 11.7 - 15.7 g/dL    Hematocrit 34.7 (A) 35.0 - 47.0 %    MCV 96.9 78 - 100 fL    MCH 30.2 26 - 33 pg    MCHC 31.1 31 - 36 g/dL    Platelet Count 152 150 - 375 10^9/L    % Granulocytes 66.0 %    % Lymphocytes 26.0 %    % Monocytes 8.0 %    Narrative    Ran twice

## 2024-11-19 ENCOUNTER — TRANSFERRED RECORDS (OUTPATIENT)
Dept: FAMILY MEDICINE | Facility: CLINIC | Age: 59
End: 2024-11-19

## 2024-11-19 ENCOUNTER — OFFICE VISIT (OUTPATIENT)
Dept: FAMILY MEDICINE | Facility: CLINIC | Age: 59
End: 2024-11-19

## 2024-11-19 VITALS
HEART RATE: 90 BPM | BODY MASS INDEX: 52.73 KG/M2 | TEMPERATURE: 97.8 F | OXYGEN SATURATION: 97 % | SYSTOLIC BLOOD PRESSURE: 118 MMHG | WEIGHT: 270 LBS | DIASTOLIC BLOOD PRESSURE: 74 MMHG

## 2024-11-19 DIAGNOSIS — D69.6 THROMBOCYTOPENIA (H): Primary | ICD-10-CM

## 2024-11-19 DIAGNOSIS — B35.4 TINEA CORPORIS: ICD-10-CM

## 2024-11-19 DIAGNOSIS — K21.9 GASTROESOPHAGEAL REFLUX DISEASE WITHOUT ESOPHAGITIS: ICD-10-CM

## 2024-11-19 DIAGNOSIS — F32.5 MAJOR DEPRESSIVE DISORDER WITH SINGLE EPISODE, IN FULL REMISSION (H): ICD-10-CM

## 2024-11-19 DIAGNOSIS — E66.01 MORBID OBESITY WITH BMI OF 60.0-69.9, ADULT (H): ICD-10-CM

## 2024-11-19 DIAGNOSIS — E11.8 DIABETES MELLITUS TYPE 2 WITH COMPLICATIONS (H): ICD-10-CM

## 2024-11-19 DIAGNOSIS — D50.0 IRON DEFICIENCY ANEMIA DUE TO CHRONIC BLOOD LOSS: ICD-10-CM

## 2024-11-19 DIAGNOSIS — I10 ESSENTIAL HYPERTENSION, BENIGN: ICD-10-CM

## 2024-11-19 DIAGNOSIS — E87.6 HYPOKALEMIA: ICD-10-CM

## 2024-11-19 LAB
% GRANULOCYTES: 66 %
ALBUMIN SERPL-MCNC: 3.3 G/DL (ref 3.6–5.1)
ALP SERPL-CCNC: 67 U/L (ref 33–130)
ALT 1742-6: 9 U/L (ref 0–32)
AST 1920-8: 23 U/L (ref 0–35)
BILIRUB SERPL-MCNC: 1.3 MG/DL (ref 0.2–1.2)
BUN SERPL-MCNC: 16 MG/DL (ref 7–25)
BUN/CREATININE RATIO: 13 (ref 6–32)
CALCIUM SERPL-MCNC: 8.9 MG/DL (ref 8.6–10.3)
CHLORIDE SERPLBLD-SCNC: 100.3 MMOL/L (ref 98–110)
CHOLEST SERPL-MCNC: 162 MG/DL (ref 0–199)
CHOLEST/HDLC SERPL: 4 {RATIO} (ref 0–5)
CO2 SERPL-SCNC: 27.2 MMOL/L (ref 20–32)
CREAT SERPL-MCNC: 1.27 MG/DL (ref 0.6–1.3)
GLUCOSE SERPL-MCNC: 118 MG/DL (ref 60–99)
HCT VFR BLD AUTO: 34.7 % (ref 35–47)
HDLC SERPL-MCNC: 46 MG/DL (ref 40–150)
HEMOGLOBIN A1C: 6.1 % (ref 4–5.6)
HEMOGLOBIN: 10.8 G/DL (ref 11.7–15.7)
LDLC SERPL CALC-MCNC: 82 MG/DL (ref 0–129)
LYMPHOCYTES NFR BLD AUTO: 26 %
MCH RBC QN AUTO: 30.2 PG (ref 26–33)
MCHC RBC AUTO-ENTMCNC: 31.1 G/DL (ref 31–36)
MCV RBC AUTO: 96.9 FL (ref 78–100)
MONOCYTES NFR BLD AUTO: 8 %
PLATELET COUNT - QUEST: 152 10^9/L (ref 150–375)
POTASSIUM SERPL-SCNC: 3.3 MMOL/L (ref 3.5–5.3)
PROT SERPL-MCNC: 7.7 G/DL (ref 6.1–8.1)
RBC # BLD AUTO: 3.58 10*12/L (ref 3.8–5.2)
SODIUM SERPL-SCNC: 140.1 MMOL/L (ref 135–146)
TRIGL SERPL-MCNC: 171 MG/DL (ref 0–149)
WBC # BLD AUTO: 5.1 10*9/L (ref 4–11)

## 2024-11-19 RX ORDER — NYSTATIN 100000 [USP'U]/G
POWDER TOPICAL 2 TIMES DAILY PRN
Qty: 60 G | Refills: 1 | Status: SHIPPED | OUTPATIENT
Start: 2024-11-19

## 2024-11-19 RX ORDER — POTASSIUM CHLORIDE 750 MG/1
10 TABLET, EXTENDED RELEASE ORAL DAILY
Qty: 90 TABLET | Refills: 1 | Status: SHIPPED | OUTPATIENT
Start: 2024-11-19

## 2024-11-19 RX ORDER — TIRZEPATIDE 7.5 MG/.5ML
7.5 INJECTION, SOLUTION SUBCUTANEOUS
Qty: 2 ML | Refills: 1 | Status: SHIPPED | OUTPATIENT
Start: 2024-11-19

## 2024-11-19 RX ORDER — FERROUS SULFATE 325(65) MG
TABLET ORAL
Qty: 90 TABLET | Refills: 1 | Status: SHIPPED | OUTPATIENT
Start: 2024-11-19

## 2024-11-19 RX ORDER — SERTRALINE HYDROCHLORIDE 100 MG/1
TABLET, FILM COATED ORAL
Qty: 90 TABLET | Refills: 1 | Status: SHIPPED | OUTPATIENT
Start: 2024-11-19

## 2024-11-19 RX ORDER — METFORMIN HYDROCHLORIDE 500 MG/1
1000 TABLET, EXTENDED RELEASE ORAL 2 TIMES DAILY WITH MEALS
Qty: 360 TABLET | Refills: 1 | Status: SHIPPED | OUTPATIENT
Start: 2024-11-19

## 2024-11-19 RX ORDER — LOSARTAN POTASSIUM 50 MG/1
50 TABLET ORAL DAILY
Qty: 90 TABLET | Refills: 1 | Status: SHIPPED | OUTPATIENT
Start: 2024-11-19

## 2024-11-19 RX ORDER — FAMOTIDINE 20 MG/1
20 TABLET, FILM COATED ORAL 2 TIMES DAILY
Qty: 180 TABLET | Refills: 1 | Status: SHIPPED | OUTPATIENT
Start: 2024-11-19

## 2024-11-19 ASSESSMENT — ANXIETY QUESTIONNAIRES
1. FEELING NERVOUS, ANXIOUS, OR ON EDGE: SEVERAL DAYS
IF YOU CHECKED OFF ANY PROBLEMS ON THIS QUESTIONNAIRE, HOW DIFFICULT HAVE THESE PROBLEMS MADE IT FOR YOU TO DO YOUR WORK, TAKE CARE OF THINGS AT HOME, OR GET ALONG WITH OTHER PEOPLE: SOMEWHAT DIFFICULT
GAD7 TOTAL SCORE: 6
7. FEELING AFRAID AS IF SOMETHING AWFUL MIGHT HAPPEN: NOT AT ALL
6. BECOMING EASILY ANNOYED OR IRRITABLE: MORE THAN HALF THE DAYS
5. BEING SO RESTLESS THAT IT IS HARD TO SIT STILL: NOT AT ALL
3. WORRYING TOO MUCH ABOUT DIFFERENT THINGS: SEVERAL DAYS
2. NOT BEING ABLE TO STOP OR CONTROL WORRYING: SEVERAL DAYS
GAD7 TOTAL SCORE: 6

## 2024-11-19 ASSESSMENT — PATIENT HEALTH QUESTIONNAIRE - PHQ9
5. POOR APPETITE OR OVEREATING: SEVERAL DAYS
SUM OF ALL RESPONSES TO PHQ QUESTIONS 1-9: 7

## 2024-11-19 NOTE — NURSING NOTE
Chief Complaint   Patient presents with    Recheck Medication     Fasting today, refill medications, rheumatologist wants CBC platelets rechecked     Pre-visit Screening:  Immunizations:  not up to date - shingrix at pharmacy  Colonoscopy:  is up to date  Mammogram: is up to date  Asthma Action Test/Plan:  NA  PHQ9:  Done today  GAD7:  Done today  Questioned patient about current smoking habits Pt. has never smoked.  Ok to leave detailed message on voice mail for today's visit only Yes, phone # [phone]

## 2024-11-21 ENCOUNTER — MEDICAL CORRESPONDENCE (OUTPATIENT)
Dept: HEALTH INFORMATION MANAGEMENT | Facility: CLINIC | Age: 59
End: 2024-11-21
Payer: COMMERCIAL

## 2024-12-03 ENCOUNTER — MEDICAL CORRESPONDENCE (OUTPATIENT)
Dept: HEALTH INFORMATION MANAGEMENT | Facility: CLINIC | Age: 59
End: 2024-12-03
Payer: COMMERCIAL

## 2024-12-05 ENCOUNTER — TRANSFERRED RECORDS (OUTPATIENT)
Dept: HEALTH INFORMATION MANAGEMENT | Facility: CLINIC | Age: 59
End: 2024-12-05
Payer: COMMERCIAL

## 2024-12-21 ENCOUNTER — HEALTH MAINTENANCE LETTER (OUTPATIENT)
Age: 59
End: 2024-12-21

## 2025-01-28 ENCOUNTER — TRANSFERRED RECORDS (OUTPATIENT)
Dept: FAMILY MEDICINE | Facility: CLINIC | Age: 60
End: 2025-01-28

## 2025-02-13 ENCOUNTER — MYC MEDICAL ADVICE (OUTPATIENT)
Dept: FAMILY MEDICINE | Facility: CLINIC | Age: 60
End: 2025-02-13

## 2025-02-22 ENCOUNTER — HEALTH MAINTENANCE LETTER (OUTPATIENT)
Age: 60
End: 2025-02-22

## 2025-03-04 ENCOUNTER — MEDICAL CORRESPONDENCE (OUTPATIENT)
Dept: HEALTH INFORMATION MANAGEMENT | Facility: CLINIC | Age: 60
End: 2025-03-04

## 2025-03-06 ENCOUNTER — OFFICE VISIT (OUTPATIENT)
Dept: PULMONOLOGY | Facility: CLINIC | Age: 60
End: 2025-03-06
Attending: INTERNAL MEDICINE
Payer: COMMERCIAL

## 2025-03-06 VITALS
HEART RATE: 94 BPM | WEIGHT: 267 LBS | BODY MASS INDEX: 52.14 KG/M2 | DIASTOLIC BLOOD PRESSURE: 80 MMHG | OXYGEN SATURATION: 98 % | SYSTOLIC BLOOD PRESSURE: 132 MMHG

## 2025-03-06 DIAGNOSIS — J96.12 CHRONIC RESPIRATORY FAILURE WITH HYPOXIA AND HYPERCAPNIA (H): ICD-10-CM

## 2025-03-06 DIAGNOSIS — J96.11 CHRONIC RESPIRATORY FAILURE WITH HYPOXIA AND HYPERCAPNIA (H): ICD-10-CM

## 2025-03-06 RX ORDER — IPRATROPIUM BROMIDE AND ALBUTEROL SULFATE 2.5; .5 MG/3ML; MG/3ML
1 SOLUTION RESPIRATORY (INHALATION) EVERY 4 HOURS PRN
Qty: 90 ML | Refills: 11 | Status: SHIPPED | OUTPATIENT
Start: 2025-03-06

## 2025-03-06 RX ORDER — BUDESONIDE 0.5 MG/2ML
0.5 INHALANT ORAL 2 TIMES DAILY
Qty: 120 ML | Refills: 11 | Status: SHIPPED | OUTPATIENT
Start: 2025-03-06

## 2025-03-06 RX ORDER — ALBUTEROL SULFATE 90 UG/1
2 INHALANT RESPIRATORY (INHALATION) EVERY 6 HOURS
Qty: 18 G | Refills: 11 | Status: SHIPPED | OUTPATIENT
Start: 2025-03-06
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
## 2025-03-14 NOTE — PROGRESS NOTES
Assessment & Plan   Problem List Items Addressed This Visit       Morbid obesity with BMI of 60.0-69.9, adult (H) - Primary    Relevant Medications    tirzepatide (MOUNJARO) 10 MG/0.5ML SOAJ auto-injector pen    Other Relevant Orders    VENOUS COLLECTION (Completed)    Comprehensive Metobolic Panel (BFP) (Completed)     Other Visit Diagnoses       Renal insufficiency        Relevant Orders    Other Specialty Referral           1. Morbid obesity with BMI of 60.0-69.9, adult (H) (Primary)  Refilled mounjaro, increase the dose.  - tirzepatide (MOUNJARO) 10 MG/0.5ML SOAJ auto-injector pen; Inject 0.5 mLs (10 mg) subcutaneously once a week.  Dispense: 2 mL; Refill: 1  - VENOUS COLLECTION  - Comprehensive Metobolic Panel (BFP)    2. Renal insufficiency  Referral to kidney specialist.  - Other Specialty Referral    3. Stage 3 chronic kidney disease, unspecified whether stage 3a or 3b CKD (H)                FUTURE APPOINTMENTS:       - Follow-up visit in 2 months when due for medication check. We manage her chronic medical care.    No follow-ups on file.    Noelle Garcia MD  [facility]    Subjective     Nursing Notes:   Sandi Boland CMA  3/17/2025  1:20 PM  Signed  Chief Complaint   Patient presents with    RECHECK     Recheck on mounjaro, sometimes having diarrhea and constipation, some nausea, these side effects occurring the day of and after the injection, she would like to move up on the dose     Pre-visit Screening:  Immunizations:  not up to date - shingrix at pharmacy  Colonoscopy:  pt delcines  Mammogram: is up to date  Asthma Action Test/Plan:  NA  PHQ9:  NA  GAD7:  NA  Questioned patient about current smoking habits Pt. has never smoked.  Ok to leave detailed message on voice mail for today's visit only Yes, phone # [phone]       Mirta Decker is a 60 year old female who presents to clinic today for the following health issues   HPI     Here with daughter. Here for flup on  mounjaro. Has nausea some days. Also has diarrhea and constipation. Has noticed weight loss on it.   Has lost 100 pounds. Wants to try going up on the dose.   She is walking, last week saw her pulmonologist. Is decreasing her torsemide.    Will be seeing Dr. Lopez, is seeing him for her low hemoglobin. He thinks this is related to methotrexate.        Review of Systems   Constitutional, HEENT, cardiovascular, pulmonary, gi and gu systems are negative, except as otherwise noted.      Objective    /82 (BP Location: Right arm, Patient Position: Sitting, Cuff Size: Adult Large)   Pulse 85   Temp 97.9  F (36.6  C) (Temporal)   Wt 117.9 kg (260 lb)   SpO2 99%   BMI 50.78 kg/m    Body mass index is 50.78 kg/m .  Physical Exam   GENERAL: alert and no distress  MS: no gross musculoskeletal defects noted, no edema  PSYCH: mentation appears normal, affect normal/bright    Results for orders placed or performed in visit on 03/17/25   Comprehensive Metobolic Panel (BFP)     Status: Abnormal   Result Value Ref Range    Carbon Dioxide 27.4 20 - 32 mmol/L    Creatinine 1.42 (A) 0.60 - 1.30 mg/dL    Glucose 128 (A) 60 - 99 mg/dL    Sodium 137.4 135 - 146 mmol/L    Potassium 3.25 (A) 3.5 - 5.3 mmol/L    Chloride 103.9 98 - 110 mmol/L    Protein Total 7.6 6.1 - 8.1 g/dL    Albumin 3.4 (A) 3.6 - 5.1 g/dL    Alkaline Phosphatase 72 33 - 130 U/L    ALT 11 0 - 32 U/L    AST 20 0 - 35 U/L    Bilirubin Total 0.9 0.2 - 1.2 mg/dL    Urea Nitrogen 17 7 - 25 mg/dL    Calcium 9.2 8.6 - 10.3 mg/dL    BUN/Creatinine Ratio 12 6 - 32    GFR Estimate 42 (A) >60 ml/min/1.73m2

## 2025-03-17 ENCOUNTER — OFFICE VISIT (OUTPATIENT)
Dept: FAMILY MEDICINE | Facility: CLINIC | Age: 60
End: 2025-03-17

## 2025-03-17 VITALS
HEART RATE: 85 BPM | OXYGEN SATURATION: 99 % | BODY MASS INDEX: 50.78 KG/M2 | TEMPERATURE: 97.9 F | SYSTOLIC BLOOD PRESSURE: 126 MMHG | DIASTOLIC BLOOD PRESSURE: 82 MMHG | WEIGHT: 260 LBS

## 2025-03-17 DIAGNOSIS — E66.01 MORBID OBESITY WITH BMI OF 60.0-69.9, ADULT (H): Primary | ICD-10-CM

## 2025-03-17 DIAGNOSIS — N18.30 STAGE 3 CHRONIC KIDNEY DISEASE, UNSPECIFIED WHETHER STAGE 3A OR 3B CKD (H): ICD-10-CM

## 2025-03-17 DIAGNOSIS — N28.9 RENAL INSUFFICIENCY: ICD-10-CM

## 2025-03-17 LAB
ALBUMIN SERPL-MCNC: 3.4 G/DL (ref 3.6–5.1)
ALP SERPL-CCNC: 72 U/L (ref 33–130)
ALT 1742-6: 11 U/L (ref 0–32)
AST 1920-8: 20 U/L (ref 0–35)
BILIRUB SERPL-MCNC: 0.9 MG/DL (ref 0.2–1.2)
BUN SERPL-MCNC: 17 MG/DL (ref 7–25)
BUN/CREATININE RATIO: 12 (ref 6–32)
CALCIUM SERPL-MCNC: 9.2 MG/DL (ref 8.6–10.3)
CHLORIDE SERPLBLD-SCNC: 103.9 MMOL/L (ref 98–110)
CO2 SERPL-SCNC: 27.4 MMOL/L (ref 20–32)
CREAT SERPL-MCNC: 1.42 MG/DL (ref 0.6–1.3)
GFR SERPL CREATININE-BSD FRML MDRD: 42 ML/MIN/1.73M2
GLUCOSE SERPL-MCNC: 128 MG/DL (ref 60–99)
POTASSIUM SERPL-SCNC: 3.25 MMOL/L (ref 3.5–5.3)
PROT SERPL-MCNC: 7.6 G/DL (ref 6.1–8.1)
SODIUM SERPL-SCNC: 137.4 MMOL/L (ref 135–146)

## 2025-03-17 PROCEDURE — 36415 COLL VENOUS BLD VENIPUNCTURE: CPT | Performed by: FAMILY MEDICINE

## 2025-03-17 PROCEDURE — 99214 OFFICE O/P EST MOD 30 MIN: CPT | Performed by: FAMILY MEDICINE

## 2025-03-17 PROCEDURE — 80053 COMPREHEN METABOLIC PANEL: CPT | Performed by: FAMILY MEDICINE

## 2025-03-17 PROCEDURE — G2211 COMPLEX E/M VISIT ADD ON: HCPCS | Performed by: FAMILY MEDICINE

## 2025-03-17 NOTE — NURSING NOTE
Chief Complaint   Patient presents with    RECHECK     Recheck on mounjaro, sometimes having diarrhea and constipation, some nausea, these side effects occurring the day of and after the injection, she would like to move up on the dose     Pre-visit Screening:  Immunizations:  not up to date - shingrix at pharmacy  Colonoscopy:  pt christoph  Mammogram: is up to date  Asthma Action Test/Plan:  LISA  PHQ9:  NA  GAD7:  NA  Questioned patient about current smoking habits Pt. has never smoked.  Ok to leave detailed message on voice mail for today's visit only Yes, phone # [phone]

## 2025-04-12 ENCOUNTER — HEALTH MAINTENANCE LETTER (OUTPATIENT)
Age: 60
End: 2025-04-12

## 2025-04-18 ENCOUNTER — MYC MEDICAL ADVICE (OUTPATIENT)
Dept: SLEEP MEDICINE | Facility: CLINIC | Age: 60
End: 2025-04-18
Payer: COMMERCIAL

## 2025-04-18 DIAGNOSIS — G47.33 OSA (OBSTRUCTIVE SLEEP APNEA): Primary | ICD-10-CM

## 2025-04-22 DIAGNOSIS — N18.30 CHRONIC KIDNEY DISEASE, STAGE III (MODERATE) (H): Primary | ICD-10-CM

## 2025-04-22 DIAGNOSIS — I12.9 MALIGNANT HYPERTENSIVE KIDNEY DISEASE WITH CHRONIC KIDNEY DISEASE STAGE I THROUGH STAGE IV, OR UNSPECIFIED(403.00): ICD-10-CM

## 2025-04-22 NOTE — TELEPHONE ENCOUNTER
Patient requesting order for replacement machine,  hers is displaying end of life message.  Pended to go to [facility].

## 2025-04-29 ENCOUNTER — TRANSFERRED RECORDS (OUTPATIENT)
Dept: FAMILY MEDICINE | Facility: CLINIC | Age: 60
End: 2025-04-29

## 2025-05-13 ENCOUNTER — HOSPITAL ENCOUNTER (OUTPATIENT)
Dept: ULTRASOUND IMAGING | Facility: CLINIC | Age: 60
Discharge: HOME | End: 2025-05-13
Attending: INTERNAL MEDICINE
Payer: COMMERCIAL

## 2025-05-13 DIAGNOSIS — N18.30 CHRONIC KIDNEY DISEASE, STAGE III (MODERATE) (H): ICD-10-CM

## 2025-05-13 PROCEDURE — 76770 US EXAM ABDO BACK WALL COMP: CPT

## 2025-05-19 ENCOUNTER — VIRTUAL VISIT (OUTPATIENT)
Dept: SLEEP MEDICINE | Facility: CLINIC | Age: 60
End: 2025-05-19
Payer: COMMERCIAL

## 2025-05-19 VITALS — WEIGHT: 260 LBS | BODY MASS INDEX: 51.04 KG/M2 | HEIGHT: 60 IN

## 2025-05-19 DIAGNOSIS — G47.36 HYPOXEMIA ASSOCIATED WITH SLEEP: ICD-10-CM

## 2025-05-19 DIAGNOSIS — G47.33 OSA TREATED WITH BIPAP: Primary | ICD-10-CM

## 2025-05-19 PROCEDURE — 98006 SYNCH AUDIO-VIDEO EST MOD 30: CPT | Performed by: INTERNAL MEDICINE

## 2025-05-19 PROCEDURE — 1126F AMNT PAIN NOTED NONE PRSNT: CPT | Mod: 95 | Performed by: INTERNAL MEDICINE

## 2025-05-19 ASSESSMENT — SLEEP AND FATIGUE QUESTIONNAIRES
HOW LIKELY ARE YOU TO NOD OFF OR FALL ASLEEP WHILE LYING DOWN TO REST IN THE AFTERNOON WHEN CIRCUMSTANCES PERMIT: MODERATE CHANCE OF DOZING
HOW LIKELY ARE YOU TO NOD OFF OR FALL ASLEEP IN A CAR, WHILE STOPPED FOR A FEW MINUTES IN TRAFFIC: WOULD NEVER DOZE
HOW LIKELY ARE YOU TO NOD OFF OR FALL ASLEEP WHILE SITTING AND TALKING TO SOMEONE: WOULD NEVER DOZE
HOW LIKELY ARE YOU TO NOD OFF OR FALL ASLEEP WHEN YOU ARE A PASSENGER IN A CAR FOR AN HOUR WITHOUT A BREAK: MODERATE CHANCE OF DOZING
HOW LIKELY ARE YOU TO NOD OFF OR FALL ASLEEP WHILE SITTING INACTIVE IN A PUBLIC PLACE: WOULD NEVER DOZE
HOW LIKELY ARE YOU TO NOD OFF OR FALL ASLEEP WHILE SITTING QUIETLY AFTER LUNCH WITHOUT ALCOHOL: SLIGHT CHANCE OF DOZING
HOW LIKELY ARE YOU TO NOD OFF OR FALL ASLEEP WHILE WATCHING TV: SLIGHT CHANCE OF DOZING
HOW LIKELY ARE YOU TO NOD OFF OR FALL ASLEEP WHILE SITTING AND READING: SLIGHT CHANCE OF DOZING

## 2025-05-19 ASSESSMENT — PAIN SCALES - GENERAL: PAINLEVEL_OUTOF10: NO PAIN (0)

## 2025-05-19 NOTE — Clinical Note
Fuentes Salazar I request you to kindly help me with the following checkout information for my, virtual visit patient: 1) Patient was instructed to bring her BiPAP device to the sleep clinic so that we can get the compliance data downloaded to review. 2) Please inform me once the download data is scanned in epic 3)My plan is to review the DL data and generate the prescription for replacement BiPAP machine that needs to be faxed to [facility].   4)Please schedule follow-up video visit with sleep clinic between 31 to 60 days after obtaining the replacement BiPAP device if she needs to meet compliance. Otherwise she will follow-up with sleep clinic in 1 year or sooner if any concerns. Thank you, Cullen

## 2025-05-19 NOTE — PROGRESS NOTES
Virtual Visit Details    Type of service:  Video Visit   Video Start Time: 4:10 PM  Video End Time:4:29 PM    Originating Location (pt. Location): Home    Distant Location (provider location):  Off-site  Platform used for Video Visit: [facility]  Sleep clinic follow-up visit note      Date: 5/19/2025     Chief complaint:  Follow-up of DAMASO, review BiPAP compliance measures     Mirta Decker is a 60 year old female who presents to sleep clinic for follow-up of previously diagnosed obstructive sleep apnea that is currently managed with bilevel PAP therapy.    Previous sleep study report:   Split-night sleep study  Date of study: 7/18/2018  Weight at the time of study: 282 pounds  Diagnostic portion of the study respiratory monitoring showed severe obstructive sleep apnea/hypopnea (AHI=56.4).  Treatment PSG: A trial of nasal CPAP was initiated given the severity of sleep-disordered breathing and CPAP pressures from 5 cwp to 12 cwp were sampled during the night.  CPAP of 12 cwp together with oxygen supplementation at 1 L/min was associated with a significant improvement in the frequency and severity of respiratory events but a fully effective CPAP pressure was not identified during this test due to low tidal   volumes with all CPAP pressures. Patient was recommended considering Bi-level Therapy in spontaneous mode.     Patient reports using BiPAP device with oxygen regularly during sleep, including when she naps.  She currently uses oxygen continuous at 2 L/min  She noticed an error message on her BiPAP machine that the motor life has exceeded the limit and wants to obtain prescription for replacement BiPAP device  Pressure settings on the bilevel PAP(ResMed) device: Max IPAP equal to 15 cmH2O; min EPAP equal to 5 cmH2O and pressure support equal to 6 cmH2O.   DME provider is [facility].  There are no reports of snoring with the BiPAP use  Once in a while she has reported awakening  gasping for air with the BiPAP that she attributes to anxiety  She uses fullface mask and reports occasional air leaks  She reports positive benefits with the BiPAP use  She lost approximately 57 pounds of weight in the last year since she was started on treatment with Mounjaro. She reports that the pressure settings on the BiPAP feel comfortable.    She reports waking up feeling rested upon awakening in the morning.  When she has nausea from Mounjaro and metformin she feels somewhat tired, but otherwise she denies any excessive daytime sleepiness.  She has not been driving.    Compliance download unavailable.      Past medical/surgical history, family history, social history, medications and allergies were reviewed.            Physical Examination:   General: Pleasant. Cooperative. In no apparent distress.  Pulmonary: Able to speak in full sentences easily. No cough or wheeze.   Neurologic: Alert, oriented x3.  Psychiatric: Mood euthymic. Affect congruent with full range and intensity.     ASSESSMENT/PLAN:  Obstructive sleep apnea and hypoxemia: Pt reports adequate compliance with BiPAP and reports positive benefits with BiPAP treatment.   Patient was instructed to bring her BiPAP device to our sleep clinic so that we can get the compliance data downloaded to review.  If the compliance download looks good, I will generate the prescription for replacement BiPAP device which will be faxed to [facility].  Recommended to continue using the current BiPAP device supplemental oxygen at 2 L/min regularly during sleep until she gets the replacement BiPAP device. She was instructed to get the  PAP supplies replaced regularly.   Patient was also instructed to communicate with me via BuddyBouncehart if she has trouble tolerating the BiPAP pressure settings as she keeps losing more weight in the future.  Patient instructed to remember to bring PAP with her if hospitalized and if anticipating procedure that requires  "sedation/surgery to be sure to discuss with the provider/surgeon that she has sleep apnea and uses PAP therapy.       Obesity: We discussed weight management with healthy diet, and exercise.     Patient was strongly advised to avoid driving, operating any heavy machinery or other hazardous situations while drowsy or sleepy.  Patient was counseled on the importance of driving while alert, to pull over if drowsy, or nap before getting into the vehicle if sleepy.       Plan is to follow-up with sleep clinic between 31 to 60 days after obtaining the replacement BiPAP device if she needs to meet compliance. Otherwise she will follow-up with sleep clinic in 1 year or sooner if any concerns.      The above note was dictated using voice recognition software. Although reviewed after completion, some word and grammatical error may remain . Please contact the author for any clarifications.      \" Total time spent was 30 minutes  for this appointment on this date of service which include time spent before, during and after the visit for chart review, patient care, counseling and coordination of care. Including documentation\"      Marcia Rose MD  [facility]  [address] , [place] [address]       "

## 2025-05-19 NOTE — NURSING NOTE
Current patient location: [address] [place] [id number]    Is the patient currently in the state of [place]? YES    Visit mode: VIDEO    If the visit is dropped, the patient can be reconnected by:VIDEO VISIT: Send to e-mail at: [email or web address]    Will anyone else be joining the visit? NO  (If patient encounters technical issues they should call [phone]    Are changes needed to the allergy or medication list? No    Are refills needed on medications prescribed by this physician? NO    Rooming Documentation:  Questionnaire(s) completed    Reason for visit: RECHECK    Emily LEON

## 2025-05-19 NOTE — PROGRESS NOTES
Assessment & Plan   Problem List Items Addressed This Visit       Essential hypertension, benign    Relevant Medications    losartan (COZAAR) 50 MG tablet    Morbid obesity with BMI of 60.0-69.9, adult (H)    Relevant Medications    tirzepatide (MOUNJARO) 12.5 MG/0.5ML SOAJ auto-injector pen    metFORMIN (GLUCOPHAGE XR) 500 MG 24 hr tablet    Anemia    Relevant Medications    famotidine (PEPCID) 20 MG tablet    ferrous sulfate (FEROSUL) 325 (65 Fe) MG tablet    Chronic kidney disease, stage 3 (H)    Relevant Orders    Phosphorus (BFP)     Other Visit Diagnoses         Medicare annual wellness visit, initial    -  Primary      Gastroesophageal reflux disease without esophagitis        Relevant Medications    famotidine (PEPCID) 20 MG tablet    ferrous sulfate (FEROSUL) 325 (65 Fe) MG tablet      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)        Relevant Medications    losartan (COZAAR) 50 MG tablet    tirzepatide (MOUNJARO) 12.5 MG/0.5ML SOAJ auto-injector pen    metFORMIN (GLUCOPHAGE XR) 500 MG 24 hr tablet    Other Relevant Orders    HEMOGLOBIN A1C (BFP) (Completed)    VENOUS COLLECTION (Completed)    ALBUMIN RANDOM URINE QUANTITATIVE (BFP) (Completed)    NC FOOT EXAM NO CHARGE (Completed)    HEMOGRAM PLATELET DIFF (BFP) (Completed)    Comprehensive Metobolic Panel (BFP)      Tinea corporis          Hypokalemia        Relevant Medications    potassium chloride melisa ER (KLOR-CON M10) 10 MEQ CR tablet      Major depressive disorder with single episode, in full remission        Relevant Medications    sertraline (ZOLOFT) 100 MG tablet           1. Medicare annual wellness visit, initial (Primary)  Completed.    2. Gastroesophageal reflux disease without esophagitis  Symptoms are controlled, refilled.  - famotidine (PEPCID) 20 MG tablet; Take 1 tablet (20 mg) by mouth 2 times daily.  Dispense: 180 tablet; Refill: 1  - ferrous sulfate (FEROSUL) 325 (65 Fe) MG tablet; Take 1 tablet (325 mg) by mouth daily  Dispense:  90 tablet; Refill: 1    3. Iron deficiency anemia due to chronic blood loss  Check labs.  - famotidine (PEPCID) 20 MG tablet; Take 1 tablet (20 mg) by mouth 2 times daily.  Dispense: 180 tablet; Refill: 1  - ferrous sulfate (FEROSUL) 325 (65 Fe) MG tablet; Take 1 tablet (325 mg) by mouth daily  Dispense: 90 tablet; Refill: 1    4. Essential hypertension, benign  Controlled on medications, refilled.  - losartan (COZAAR) 50 MG tablet; Take 1 tablet (50 mg) by mouth daily. HOLD if systolic Blood pressure is under 115  Dispense: 90 tablet; Refill: 1    5. 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 Increase dose of mounjaro as this is helping with her diabetes and weight loss. Decrease her metformin to 500 mg twice daily. Recheck in 3-6 months.  - losartan (COZAAR) 50 MG tablet; Take 1 tablet (50 mg) by mouth daily. HOLD if systolic Blood pressure is under 115  Dispense: 90 tablet; Refill: 1  - HEMOGLOBIN A1C (BFP)  - VENOUS COLLECTION  - ALBUMIN RANDOM URINE QUANTITATIVE (BFP)  - ID FOOT EXAM NO CHARGE  - tirzepatide (MOUNJARO) 12.5 MG/0.5ML SOAJ auto-injector pen; Inject 0.5 mLs (12.5 mg) subcutaneously once a week.  Dispense: 2 mL; Refill: 2  - HEMOGRAM PLATELET DIFF (BFP)  - Comprehensive Metobolic Panel (BFP)  - metFORMIN (GLUCOPHAGE XR) 500 MG 24 hr tablet; Take 1 tablet (500 mg) by mouth 2 times daily (with meals).  Dispense: 180 tablet; Refill: 1    6. Morbid obesity with BMI of 60.0-69.9, adult (H)    - metFORMIN (GLUCOPHAGE XR) 500 MG 24 hr tablet; Take 1 tablet (500 mg) by mouth 2 times daily (with meals).  Dispense: 180 tablet; Refill: 1    7. Tinea corporis      8. Hypokalemia    - potassium chloride melisa ER (KLOR-CON M10) 10 MEQ CR tablet; Take 2 tablets (20 mEq) by mouth daily.  Dispense: 180 tablet; Refill: 1    9. Major depressive disorder with single episode, in full remission  Controlled, refilled.  - sertraline (ZOLOFT) 100 MG tablet; TAKE 1 TABLET BY MOUTH ONE TIME DAILY   Dispense: 90 tablet; Refill: 1    10. Stage 3 chronic kidney disease, unspecified whether stage 3a or 3b CKD (H)  She sees kidney specialist.  - Phosphorus (BFP)              FUTURE APPOINTMENTS:       - Follow-up visit in 3-6 months. We manage her chronic medical care.    No follow-ups on file.    Noelle Garcia MD  [facility]    Subjective     Nursing Notes:   Sandi Boland, JACKSON  5/20/2025 11:05 AM  Signed  Chief Complaint   Patient presents with    Recheck Medication     Fasting today, refill medications, she would like to move up on her mounjaro dose    Orders     Creatinine needs to be drawn for nephrologist     Pre-visit Screening:  Immunizations:  up to date  Colonoscopy:  pt declines  Mammogram: pt declines  Asthma Action Test/Plan:  NA  PHQ9:  Done today  GAD7:  Done today  Questioned patient about current smoking habits Pt. has never smoked.  Ok to leave detailed message on voice mail for today's visit only Yes, phone # [phone]       Mirta Decker is a 60 year old female who presents to clinic today for the following health issues   HPI     Here with her  to followup on her medications, labs.  She has diabetes, is now on metformin and mounjaro and has lost weight, would like to go up on her dose of mounjaro. No problems with side effects.  Also mood is controlled on medications, wants to continue with the same dose.        Review of Systems   Constitutional, HEENT, cardiovascular, pulmonary, gi and gu systems are negative, except as otherwise noted.      Objective    /78 (BP Location: Right arm, Patient Position: Sitting, Cuff Size: Adult Large)   Pulse 58   Temp 97.8  F (36.6  C) (Temporal)   Ht 1.524 m (5')   Wt 119.7 kg (264 lb)   SpO2 98%   BMI 51.56 kg/m    Body mass index is 51.56 kg/m .  Physical Exam   GENERAL: alert and no distress  RESP: lungs clear to auscultation - no rales, rhonchi or wheezes  CV: regular rate and rhythm, normal S1 S2, no S3 or  S4, no murmur, click or rub, no peripheral edema  MS: no gross musculoskeletal defects noted, no edema  PSYCH: mentation appears normal, affect normal/bright  Diabetic foot exam: normal DP and PT pulses, no trophic changes or ulcerative lesions, and normal sensory exam    Results for orders placed or performed in visit on 05/20/25   HEMOGLOBIN A1C (BFP)     Status: None   Result Value Ref Range    Hemoglobin A1C 5.2 4 - 5.6 %   ALBUMIN RANDOM URINE QUANTITATIVE (BFP)     Status: Abnormal   Result Value Ref Range    Albumin mg/L 80 (A) <30    Creatinine Urine mg/dL 100 <300 mg/dL    Albumin Urine mg/g Cr  (A) <30 MG/G Creatinine   HEMOGRAM PLATELET DIFF (BFP)     Status: Abnormal   Result Value Ref Range    WBC 4.7 4.0 - 11 10*9/L    RBC Count 3.45 (A) 3.8 - 5.2 10*12/L    Hemoglobin 10.2 (A) 11.7 - 15.7 g/dL    Hematocrit 33.9 (A) 35.0 - 47.0 %    MCV 98.2 78 - 100 fL    MCH 29.6 26 - 33 pg    MCHC 30.1 (A) 31 - 36 g/dL    Platelet Count 114 (A) 150 - 375 10^9/L    % Granulocytes 61.4 %    % Lymphocytes 32.2 %    % Monocytes 6.4 %    Narrative    Ran twice

## 2025-05-20 ENCOUNTER — OFFICE VISIT (OUTPATIENT)
Dept: FAMILY MEDICINE | Facility: CLINIC | Age: 60
End: 2025-05-20

## 2025-05-20 VITALS
TEMPERATURE: 97.8 F | BODY MASS INDEX: 51.83 KG/M2 | SYSTOLIC BLOOD PRESSURE: 128 MMHG | HEART RATE: 58 BPM | WEIGHT: 264 LBS | DIASTOLIC BLOOD PRESSURE: 78 MMHG | OXYGEN SATURATION: 98 % | HEIGHT: 60 IN

## 2025-05-20 DIAGNOSIS — E87.6 HYPOKALEMIA: ICD-10-CM

## 2025-05-20 DIAGNOSIS — D50.0 IRON DEFICIENCY ANEMIA DUE TO CHRONIC BLOOD LOSS: ICD-10-CM

## 2025-05-20 DIAGNOSIS — Z00.00 MEDICARE ANNUAL WELLNESS VISIT, INITIAL: Primary | ICD-10-CM

## 2025-05-20 DIAGNOSIS — E11.8 DIABETES MELLITUS TYPE 2 WITH COMPLICATIONS (H): ICD-10-CM

## 2025-05-20 DIAGNOSIS — K21.9 GASTROESOPHAGEAL REFLUX DISEASE WITHOUT ESOPHAGITIS: ICD-10-CM

## 2025-05-20 DIAGNOSIS — N18.30 STAGE 3 CHRONIC KIDNEY DISEASE, UNSPECIFIED WHETHER STAGE 3A OR 3B CKD (H): ICD-10-CM

## 2025-05-20 DIAGNOSIS — B35.4 TINEA CORPORIS: ICD-10-CM

## 2025-05-20 DIAGNOSIS — F32.5 MAJOR DEPRESSIVE DISORDER WITH SINGLE EPISODE, IN FULL REMISSION: ICD-10-CM

## 2025-05-20 DIAGNOSIS — I10 ESSENTIAL HYPERTENSION, BENIGN: ICD-10-CM

## 2025-05-20 DIAGNOSIS — E66.01 MORBID OBESITY WITH BMI OF 60.0-69.9, ADULT (H): ICD-10-CM

## 2025-05-20 LAB
% GRANULOCYTES: 61.4 %
ALBUMIN (URINE) MG/L: 80
ALBUMIN URINE MG/G CR: ABNORMAL MG/G CREATININE
CREATININE URINE MG/DL: 100 MG/DL
HCT VFR BLD AUTO: 33.9 % (ref 35–47)
HEMOGLOBIN A1C: 5.2 % (ref 4–5.6)
HEMOGLOBIN: 10.2 G/DL (ref 11.7–15.7)
LYMPHOCYTES NFR BLD AUTO: 32.2 %
MCH RBC QN AUTO: 29.6 PG (ref 26–33)
MCHC RBC AUTO-ENTMCNC: 30.1 G/DL (ref 31–36)
MCV RBC AUTO: 98.2 FL (ref 78–100)
MONOCYTES NFR BLD AUTO: 6.4 %
PLATELET COUNT - QUEST: 114 10^9/L (ref 150–375)
RBC # BLD AUTO: 3.45 10*12/L (ref 3.8–5.2)
WBC # BLD AUTO: 4.7 10*9/L (ref 4–11)

## 2025-05-20 PROCEDURE — 83036 HEMOGLOBIN GLYCOSYLATED A1C: CPT | Performed by: FAMILY MEDICINE

## 2025-05-20 PROCEDURE — 36415 COLL VENOUS BLD VENIPUNCTURE: CPT | Performed by: FAMILY MEDICINE

## 2025-05-20 PROCEDURE — G2211 COMPLEX E/M VISIT ADD ON: HCPCS | Performed by: FAMILY MEDICINE

## 2025-05-20 PROCEDURE — 99214 OFFICE O/P EST MOD 30 MIN: CPT | Mod: 25 | Performed by: FAMILY MEDICINE

## 2025-05-20 PROCEDURE — 85025 COMPLETE CBC W/AUTO DIFF WBC: CPT | Performed by: FAMILY MEDICINE

## 2025-05-20 PROCEDURE — G0438 PPPS, INITIAL VISIT: HCPCS | Performed by: FAMILY MEDICINE

## 2025-05-20 PROCEDURE — 96127 BRIEF EMOTIONAL/BEHAV ASSMT: CPT | Performed by: FAMILY MEDICINE

## 2025-05-20 PROCEDURE — 84100 ASSAY OF PHOSPHORUS: CPT | Performed by: FAMILY MEDICINE

## 2025-05-20 PROCEDURE — 80053 COMPREHEN METABOLIC PANEL: CPT | Performed by: FAMILY MEDICINE

## 2025-05-20 PROCEDURE — 82570 ASSAY OF URINE CREATININE: CPT | Performed by: FAMILY MEDICINE

## 2025-05-20 PROCEDURE — 82043 UR ALBUMIN QUANTITATIVE: CPT | Performed by: FAMILY MEDICINE

## 2025-05-20 RX ORDER — NYSTATIN 100000 [USP'U]/G
POWDER TOPICAL 2 TIMES DAILY PRN
Qty: 60 G | Refills: 1 | Status: CANCELLED | OUTPATIENT
Start: 2025-05-20

## 2025-05-20 RX ORDER — FERROUS SULFATE 325(65) MG
TABLET ORAL
Qty: 90 TABLET | Refills: 1 | Status: SHIPPED | OUTPATIENT
Start: 2025-05-20

## 2025-05-20 RX ORDER — POTASSIUM CHLORIDE 750 MG/1
20 TABLET, EXTENDED RELEASE ORAL DAILY
Qty: 180 TABLET | Refills: 1 | Status: SHIPPED | OUTPATIENT
Start: 2025-05-20

## 2025-05-20 RX ORDER — SERTRALINE HYDROCHLORIDE 100 MG/1
TABLET, FILM COATED ORAL
Qty: 90 TABLET | Refills: 1 | Status: SHIPPED | OUTPATIENT
Start: 2025-05-20

## 2025-05-20 RX ORDER — LOSARTAN POTASSIUM 50 MG/1
50 TABLET ORAL DAILY
Qty: 90 TABLET | Refills: 1 | Status: SHIPPED | OUTPATIENT
Start: 2025-05-20

## 2025-05-20 RX ORDER — METFORMIN HYDROCHLORIDE 500 MG/1
1000 TABLET, EXTENDED RELEASE ORAL 2 TIMES DAILY WITH MEALS
Qty: 360 TABLET | Refills: 1 | Status: CANCELLED | OUTPATIENT
Start: 2025-05-20

## 2025-05-20 RX ORDER — METFORMIN HYDROCHLORIDE 500 MG/1
500 TABLET, EXTENDED RELEASE ORAL 2 TIMES DAILY WITH MEALS
Qty: 180 TABLET | Refills: 1 | Status: SHIPPED | OUTPATIENT
Start: 2025-05-20

## 2025-05-20 RX ORDER — FAMOTIDINE 20 MG/1
20 TABLET, FILM COATED ORAL 2 TIMES DAILY
Qty: 180 TABLET | Refills: 1 | Status: SHIPPED | OUTPATIENT
Start: 2025-05-20

## 2025-05-20 RX ORDER — CHOLECALCIFEROL (VITAMIN D3) 50 MCG
1 TABLET ORAL 2 TIMES DAILY
COMMUNITY

## 2025-05-20 ASSESSMENT — ANXIETY QUESTIONNAIRES
7. FEELING AFRAID AS IF SOMETHING AWFUL MIGHT HAPPEN: NOT AT ALL
3. WORRYING TOO MUCH ABOUT DIFFERENT THINGS: SEVERAL DAYS
5. BEING SO RESTLESS THAT IT IS HARD TO SIT STILL: NOT AT ALL
GAD7 TOTAL SCORE: 4
6. BECOMING EASILY ANNOYED OR IRRITABLE: SEVERAL DAYS
IF YOU CHECKED OFF ANY PROBLEMS ON THIS QUESTIONNAIRE, HOW DIFFICULT HAVE THESE PROBLEMS MADE IT FOR YOU TO DO YOUR WORK, TAKE CARE OF THINGS AT HOME, OR GET ALONG WITH OTHER PEOPLE: SOMEWHAT DIFFICULT
2. NOT BEING ABLE TO STOP OR CONTROL WORRYING: NOT AT ALL
GAD7 TOTAL SCORE: 4
1. FEELING NERVOUS, ANXIOUS, OR ON EDGE: SEVERAL DAYS

## 2025-05-20 ASSESSMENT — PATIENT HEALTH QUESTIONNAIRE - PHQ9
5. POOR APPETITE OR OVEREATING: SEVERAL DAYS
SUM OF ALL RESPONSES TO PHQ QUESTIONS 1-9: 5

## 2025-05-20 NOTE — PATIENT INSTRUCTIONS
Health Maintenance   Topic Date Due    PAP  Never done    RSV VACCINE (1 - Risk 60-74 years 1-dose series) Never done    MICROALBUMIN  05/07/2025    COVID-19 Vaccine (7 - Moderna risk 2024-25 season) 05/16/2025    PHQ-9  05/19/2025    HF ACTION PLAN  11/01/2025 (Originally 1965)    ASTHMA ACTION PLAN  11/01/2025 (Originally 3/23/2023)    ASTHMA CONTROL TEST  11/01/2025 (Originally 9/23/2022)    ZOSTER IMMUNIZATION (1 of 2) 11/19/2025 (Originally 1/7/1982)    MAMMO SCREENING  11/20/2025 (Originally 7/27/2020)    Pneumococcal Vaccine: 50+ Years (1 of 2 - PCV) 11/20/2025 (Originally 1/7/1982)    COLORECTAL CANCER SCREENING  11/20/2025 (Originally 1/7/1973)    A1C  08/20/2025    BMP  09/17/2025    LIPID  11/19/2025    EYE EXAM  11/19/2025    MEDICARE ANNUAL WELLNESS VISIT  05/20/2026    DIABETIC FOOT EXAM  05/20/2026    CBC  05/20/2026    ADVANCE CARE PLANNING  02/05/2029    DTAP/TDAP/TD IMMUNIZATION (3 - Td or Tdap) 09/30/2032    TSH W/FREE T4 REFLEX  Completed    HEPATITIS C SCREENING  Completed    DEPRESSION ACTION PLAN  Completed    INFLUENZA VACCINE  Completed    URINALYSIS  Completed    HPV IMMUNIZATION  Aged Out    MENINGITIS IMMUNIZATION  Aged Out    ALT  Discontinued    HIV SCREENING  Discontinued    HEMOGLOBIN  Discontinued
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
## 2025-05-20 NOTE — NURSING NOTE
Chief Complaint   Patient presents with    Recheck Medication     Fasting today, refill medications, she would like to move up on her mounjaro dose    Orders     Creatinine needs to be drawn for nephrologist     Pre-visit Screening:  Immunizations:  up to date  Colonoscopy:  pt declines  Mammogram: pt declines  Asthma Action Test/Plan:  NA  PHQ9:  Done today  GAD7:  Done today  Questioned patient about current smoking habits Pt. has never smoked.  Ok to leave detailed message on voice mail for today's visit only Yes, phone # [phone]

## 2025-05-21 ENCOUNTER — RESULTS FOLLOW-UP (OUTPATIENT)
Dept: FAMILY MEDICINE | Facility: CLINIC | Age: 60
End: 2025-05-21

## 2025-05-21 ENCOUNTER — MYC MEDICAL ADVICE (OUTPATIENT)
Dept: SLEEP MEDICINE | Facility: CLINIC | Age: 60
End: 2025-05-21
Payer: COMMERCIAL

## 2025-05-21 LAB
ALBUMIN SERPL-MCNC: 3.4 G/DL (ref 3.6–5.1)
ALP SERPL-CCNC: 60 U/L (ref 33–130)
ALT 1742-6: 6 U/L (ref 0–32)
AST 1920-8: 17 U/L (ref 0–35)
BILIRUB SERPL-MCNC: 0.9 MG/DL (ref 0.2–1.2)
BUN SERPL-MCNC: 18 MG/DL (ref 7–25)
BUN/CREATININE RATIO: 15 (ref 6–32)
CALCIUM SERPL-MCNC: 9.3 MG/DL (ref 8.6–10.3)
CHLORIDE SERPLBLD-SCNC: 104 MMOL/L (ref 98–110)
CO2 SERPL-SCNC: 30 MMOL/L (ref 20–32)
CREAT SERPL-MCNC: 1.24 MG/DL (ref 0.6–1.3)
GLUCOSE SERPL-MCNC: 98 MG/DL (ref 60–99)
PHOSPHATE SERPL-MCNC: 3.3 MG/DL (ref 2.1–4.3)
POTASSIUM SERPL-SCNC: 3.42 MMOL/L (ref 3.5–5.3)
PROT SERPL-MCNC: 7.6 G/DL (ref 6.1–8.1)
SODIUM SERPL-SCNC: 141.8 MMOL/L (ref 135–146)

## 2025-05-29 ENCOUNTER — MEDICAL CORRESPONDENCE (OUTPATIENT)
Dept: HEALTH INFORMATION MANAGEMENT | Facility: CLINIC | Age: 60
End: 2025-05-29
Payer: COMMERCIAL

## 2025-06-03 DIAGNOSIS — I12.9 MALIGNANT HYPERTENSIVE KIDNEY DISEASE WITH CHRONIC KIDNEY DISEASE STAGE I THROUGH STAGE IV, OR UNSPECIFIED(403.00): Primary | ICD-10-CM

## 2025-06-03 DIAGNOSIS — N18.30 CHRONIC KIDNEY DISEASE, STAGE III (MODERATE) (H): ICD-10-CM

## 2025-06-05 ENCOUNTER — MEDICAL CORRESPONDENCE (OUTPATIENT)
Dept: HEALTH INFORMATION MANAGEMENT | Facility: CLINIC | Age: 60
End: 2025-06-05
Payer: COMMERCIAL

## 2025-06-06 NOTE — PLAN OF CARE
PRIMARY DIAGNOSIS: Hypoxia, ASTHMA  OUTPATIENT/OBSERVATION GOALS TO BE MET BEFORE DISCHARGE:  1. Vital signs stable: Yes    2. Improvement of peak flow to greater than 70% sustained off nebulizer for 4 hours: Yes    3. Dyspnea improved and O2 sats >88% at RA or at prior home O2 therapy level: Yes      SpO2: 94 %, O2 Device: BiPAP/CPAP on 3 L of oxygen    4. Short term supplemental O2 needed for use with activity at home: Uses 3 L of O2 baseline at home    5. Tolerating adequate PO diet and medications: Yes    6. Return to near baseline physical activity: No    Discharge Planner Nurse   Safe discharge environment identified: Yes  Barriers to discharge: Yes       Entered by: Lester Thibodeaux 03/09/2020 12:27 AM     Vitals are Temp: 97.3  F (36.3  C) Temp src: Oral BP: 103/50 Pulse: 82 Heart Rate: 70 Resp: 20 SpO2: 91 %.  Patient is Alert and Oriented x4. They are 1 Assist with Gait Belt and Walker.  Pt is a Regular diet.  They are complaining of 4/10 headache. Tylenol given with relief.   Patient has Normal Saline 0.9% running at 100 mL per hour. BiPAP set up by RT and in use with 3 L of oxygen bleed in. O2 sats ranging from 89-92%. Pt stated she felt comfortable at this time. Pt denies SOB, dizziness, chest pain, and nausea. LS diminished. BS active. Last BM 3/7/2020. CMS intact. HS insulin given for BG of 278. Daughter at bedside. Will continue to monitor and provide supportive cares.         Please review provider order for any additional goals.   Nurse to notify provider when observation goals have been met and patient is ready for discharge.   [FreeTextEntry1] : labs nonfasting if continues weight gain despite modifications consider gene panel

## 2025-08-06 ENCOUNTER — TRANSFERRED RECORDS (OUTPATIENT)
Dept: FAMILY MEDICINE | Facility: CLINIC | Age: 60
End: 2025-08-06

## 2025-08-10 ENCOUNTER — MYC REFILL (OUTPATIENT)
Dept: FAMILY MEDICINE | Facility: CLINIC | Age: 60
End: 2025-08-10

## 2025-08-10 DIAGNOSIS — I10 ESSENTIAL HYPERTENSION, BENIGN: ICD-10-CM

## 2025-08-10 DIAGNOSIS — E11.8 DIABETES MELLITUS TYPE 2 WITH COMPLICATIONS (H): ICD-10-CM

## 2025-08-10 RX ORDER — LOSARTAN POTASSIUM 50 MG/1
50 TABLET ORAL DAILY
Qty: 90 TABLET | Refills: 1 | Status: CANCELLED | OUTPATIENT
Start: 2025-08-10

## 2025-08-24 ENCOUNTER — CARE COORDINATION (OUTPATIENT)
Dept: SLEEP MEDICINE | Facility: CLINIC | Age: 60
End: 2025-08-24
Payer: COMMERCIAL

## 2025-08-24 DIAGNOSIS — G47.33 OSA TREATED WITH BIPAP: Primary | ICD-10-CM
